# Patient Record
Sex: MALE | Race: WHITE | NOT HISPANIC OR LATINO | Employment: OTHER | URBAN - METROPOLITAN AREA
[De-identification: names, ages, dates, MRNs, and addresses within clinical notes are randomized per-mention and may not be internally consistent; named-entity substitution may affect disease eponyms.]

---

## 2017-01-04 ENCOUNTER — OFFICE VISIT (OUTPATIENT)
Dept: FAMILY MEDICINE CLINIC | Facility: CLINIC | Age: 72
End: 2017-01-04

## 2017-01-04 ENCOUNTER — APPOINTMENT (OUTPATIENT)
Dept: LAB | Facility: HOSPITAL | Age: 72
End: 2017-01-04

## 2017-01-04 VITALS
BODY MASS INDEX: 22.89 KG/M2 | HEIGHT: 72 IN | WEIGHT: 169 LBS | DIASTOLIC BLOOD PRESSURE: 68 MMHG | SYSTOLIC BLOOD PRESSURE: 104 MMHG | HEART RATE: 70 BPM

## 2017-01-04 DIAGNOSIS — R73.03 PREDIABETES: ICD-10-CM

## 2017-01-04 DIAGNOSIS — E78.49 OTHER HYPERLIPIDEMIA: Primary | ICD-10-CM

## 2017-01-04 DIAGNOSIS — I10 ESSENTIAL HYPERTENSION: ICD-10-CM

## 2017-01-04 LAB
ALBUMIN SERPL-MCNC: 4.4 G/DL (ref 3.2–4.8)
ALBUMIN/GLOB SERPL: 1.8 G/DL (ref 1.5–2.5)
ALP SERPL-CCNC: 116 U/L (ref 25–100)
ALT SERPL W P-5'-P-CCNC: 27 U/L (ref 7–40)
ANION GAP SERPL CALCULATED.3IONS-SCNC: 7 MMOL/L (ref 3–11)
ARTICHOKE IGE QN: 54 MG/DL (ref 0–130)
AST SERPL-CCNC: 23 U/L (ref 0–33)
BILIRUB SERPL-MCNC: 1.1 MG/DL (ref 0.3–1.2)
BUN BLD-MCNC: 14 MG/DL (ref 9–23)
BUN/CREAT SERPL: 17.5 (ref 7–25)
CALCIUM SPEC-SCNC: 9.8 MG/DL (ref 8.7–10.4)
CHLORIDE SERPL-SCNC: 106 MMOL/L (ref 99–109)
CHOLEST SERPL-MCNC: 109 MG/DL (ref 0–200)
CO2 SERPL-SCNC: 26 MMOL/L (ref 20–31)
CREAT BLD-MCNC: 0.8 MG/DL (ref 0.6–1.3)
GFR SERPL CREATININE-BSD FRML MDRD: 96 ML/MIN/1.73
GLOBULIN UR ELPH-MCNC: 2.4 GM/DL
GLUCOSE BLD-MCNC: 113 MG/DL (ref 70–100)
HDLC SERPL-MCNC: 42 MG/DL (ref 40–60)
POTASSIUM BLD-SCNC: 4.4 MMOL/L (ref 3.5–5.5)
PROT SERPL-MCNC: 6.8 G/DL (ref 5.7–8.2)
SODIUM BLD-SCNC: 139 MMOL/L (ref 132–146)
TRIGL SERPL-MCNC: 51 MG/DL (ref 0–150)

## 2017-01-04 PROCEDURE — 99213 OFFICE O/P EST LOW 20 MIN: CPT | Performed by: PHYSICIAN ASSISTANT

## 2017-01-04 PROCEDURE — 36415 COLL VENOUS BLD VENIPUNCTURE: CPT | Performed by: PHYSICIAN ASSISTANT

## 2017-01-04 PROCEDURE — 80053 COMPREHEN METABOLIC PANEL: CPT | Performed by: PHYSICIAN ASSISTANT

## 2017-01-04 PROCEDURE — 83036 HEMOGLOBIN GLYCOSYLATED A1C: CPT | Performed by: PHYSICIAN ASSISTANT

## 2017-01-04 PROCEDURE — 80061 LIPID PANEL: CPT | Performed by: PHYSICIAN ASSISTANT

## 2017-01-04 RX ORDER — NIACIN 1000 MG/1
TABLET, EXTENDED RELEASE ORAL
Qty: 180 TABLET | Refills: 1 | Status: SHIPPED | OUTPATIENT
Start: 2017-01-04 | End: 2017-07-06 | Stop reason: SDUPTHER

## 2017-01-04 RX ORDER — ATORVASTATIN CALCIUM 10 MG/1
10 TABLET, FILM COATED ORAL DAILY
Qty: 90 TABLET | Refills: 3 | Status: SHIPPED | OUTPATIENT
Start: 2017-01-04 | End: 2018-01-12 | Stop reason: SDUPTHER

## 2017-01-04 RX ORDER — CLOPIDOGREL BISULFATE 75 MG/1
75 TABLET ORAL DAILY
Qty: 90 TABLET | Refills: 3 | Status: SHIPPED | OUTPATIENT
Start: 2017-01-04 | End: 2018-01-12 | Stop reason: SDUPTHER

## 2017-01-04 RX ORDER — INFLUENZA A VIRUS A/MICHIGAN/45/2015 X-275 (H1N1) ANTIGEN (FORMALDEHYDE INACTIVATED), INFLUENZA A VIRUS A/SINGAPORE/INFIMH-16-0019/2016 IVR-186 (H3N2) ANTIGEN (FORMALDEHYDE INACTIVATED), AND INFLUENZA B VIRUS B/MARYLAND/15/2016 BX-69A (A B/COLORADO/6/2017-LIKE VIRUS) ANTIGEN (FORMALDEHYDE INACTIVATED) 60; 60; 60 UG/.5ML; UG/.5ML; UG/.5ML
INJECTION, SUSPENSION INTRAMUSCULAR
COMMUNITY
Start: 2016-11-01 | End: 2017-07-06

## 2017-01-04 RX ORDER — LISINOPRIL 20 MG/1
20 TABLET ORAL DAILY
Qty: 90 TABLET | Refills: 3 | Status: SHIPPED | OUTPATIENT
Start: 2017-01-04 | End: 2018-01-12 | Stop reason: SDUPTHER

## 2017-01-04 NOTE — PROGRESS NOTES
Subjective   Dominik Varma is a 71 y.o. male    History of Present Illness  Patient presents enough stay for follow-up on hypertension, history of CVA and hyperlipidemia.  CVA was in 2004 he has been on Plavix ever since.  He stopped smoking at that time as well.  He stays active is a  and feels well denies any complaints.  He has a history of prediabetes in the past that he treats with diet.  The following portions of the patient's history were reviewed and updated as appropriate: allergies, current medications, past social history and problem list    Review of Systems   Constitutional: Negative for appetite change, diaphoresis, fatigue and unexpected weight change.   Eyes: Negative for visual disturbance.   Respiratory: Negative for cough, chest tightness and shortness of breath.    Cardiovascular: Negative for chest pain, palpitations and leg swelling.   Gastrointestinal: Negative for diarrhea, nausea and vomiting.   Endocrine: Negative for polydipsia, polyphagia and polyuria.   Skin: Negative for color change and rash.   Neurological: Negative for dizziness, syncope, weakness, light-headedness, numbness and headaches.       Objective     Vitals:    01/04/17 1057   BP: 104/68   Pulse: 70       Physical Exam   Constitutional: He appears well-developed and well-nourished.   Neck: No JVD present.   Cardiovascular: Normal rate, regular rhythm, normal heart sounds, intact distal pulses and normal pulses.    No murmur heard.  Pulmonary/Chest: Effort normal and breath sounds normal. No respiratory distress.   Abdominal: Soft. Bowel sounds are normal. There is no hepatosplenomegaly. There is no tenderness.   Musculoskeletal: He exhibits no edema.   Skin: Skin is warm and dry.   Psychiatric: He has a normal mood and affect. His behavior is normal. Judgment and thought content normal.   Nursing note and vitals reviewed.      Assessment/Plan     Diagnoses and all orders for this visit:    Other  hyperlipidemia  -     Lipid Panel    Prediabetes  -     Hemoglobin A1c    Essential hypertension  -     Comprehensive Metabolic Panel    Other orders  -     FLUZONE HIGH-DOSE 0.5 ML suspension prefilled syringe injection;   -     niacin (NIASPAN) 1000 MG CR tablet; Take one twice daily  -     lisinopril (PRINIVIL,ZESTRIL) 20 MG tablet; Take 1 tablet by mouth Daily.  -     clopidogrel (PLAVIX) 75 MG tablet; Take 1 tablet by mouth Daily.  -     atorvastatin (LIPITOR) 10 MG tablet; Take 1 tablet by mouth Daily.

## 2017-01-04 NOTE — MR AVS SNAPSHOT
Dominik HOWARD Avani   1/4/2017 10:30 AM   Office Visit    Dept Phone:  [phone]   Encounter #:  [id number]    Provider:  Kassidy Bolton PA-C   Department:  [facility]                Your Full Care Plan              Today's Medication Changes          These changes are accurate as of: 1/4/17 11:29 AM.  If you have any questions, ask your nurse or doctor.               Medication(s)that have changed:     atorvastatin 10 MG tablet   Commonly known as:  LIPITOR   Take 1 tablet by mouth Daily.   What changed:  See the new instructions.       clopidogrel 75 MG tablet   Commonly known as:  PLAVIX   Take 1 tablet by mouth Daily.   What changed:  See the new instructions.       lisinopril 20 MG tablet   Commonly known as:  PRINIVIL,ZESTRIL   Take 1 tablet by mouth Daily.   What changed:  See the new instructions.       niacin 1000 MG CR tablet   Commonly known as:  NIASPAN   Take one twice daily   What changed:  See the new instructions.            Where to Get Your Medications      These medications were sent to [address] AT  & [facility] - [phone]  - [phone] [address] [address]     Phone:  [phone]     atorvastatin 10 MG tablet    clopidogrel 75 MG tablet    lisinopril 20 MG tablet    niacin 1000 MG CR tablet                  Your Updated Medication List          This list is accurate as of: 1/4/17 11:29 AM.  Always use your most recent med list.                atorvastatin 10 MG tablet   Commonly known as:  LIPITOR   Take 1 tablet by mouth Daily.       clopidogrel 75 MG tablet   Commonly known as:  PLAVIX   Take 1 tablet by mouth Daily.       FLUZONE HIGH-DOSE 0.5 ML suspension prefilled syringe injection   Generic drug:  influenza vac split high-dose       lisinopril 20 MG tablet   Commonly known as:  PRINIVIL,ZESTRIL   Take 1 tablet by mouth Daily.       niacin  "1000 MG CR tablet   Commonly known as:  NIASPAN   Take one twice daily               We Performed the Following     Comprehensive Metabolic Panel     Hemoglobin A1c     Lipid Panel       You Were Diagnosed With        Codes Comments    Other hyperlipidemia    -  Primary ICD-10-CM: E78.4  ICD-9-CM: 272.4     Prediabetes     ICD-10-CM: R73.03  ICD-9-CM: 790.29     Essential hypertension     ICD-10-CM: I10  ICD-9-CM: 401.9       Instructions     None    Patient Instructions History      Upcoming Appointments     Visit Type Date Time Department    OFFICE VISIT 2017 10:30 AM [facility]      [employer] Signup     [facility] [employer] allows you to send messages to your doctor, view your test results, renew your prescriptions, schedule appointments, and more. To sign up, go to [employer] and click on the Sign Up Now link in the New User? box. Enter your [employer] Activation Code exactly as it appears below along with the last four digits of your Social Security Number and your Date of Birth () to complete the sign-up process. If you do not sign up before the expiration date, you must request a new code.    [employer] Activation Code: [email or web address]  Expires: 2017 11:29 AM    If you have questions, you can email [email or web address] or call [phone] to talk to our [employer] staff. Remember, [employer] is NOT to be used for urgent needs. For medical emergencies, dial 911.               Other Info from Your Visit           Allergies     No Known Allergies      Reason for Visit     Med Refill lipitor, plavix, lisinolpril, and niaspan      Vital Signs     Blood Pressure Pulse Height Weight Body Mass Index Smoking Status    104/68 (BP Location: Left arm, Patient Position: Sitting, Cuff Size: Adult) 70 72\" (182.9 cm) 169 lb (76.7 kg) 22.92 kg/m2 Former Smoker      Problems and Diagnoses Noted     High cholesterol or triglycerides    Prediabetes        High blood pressure            "

## 2017-01-05 ENCOUNTER — TELEPHONE (OUTPATIENT)
Dept: FAMILY MEDICINE CLINIC | Facility: CLINIC | Age: 72
End: 2017-01-05

## 2017-01-05 LAB — HBA1C MFR BLD: 5.5 % (ref 4.8–5.6)

## 2017-01-05 NOTE — TELEPHONE ENCOUNTER
----- Message from Kassidy Bolton PA-C sent at 1/5/2017  2:47 PM EST -----  Mary, patient letter has been sent to printer with report results. Please mail.

## 2017-07-06 ENCOUNTER — OFFICE VISIT (OUTPATIENT)
Dept: FAMILY MEDICINE CLINIC | Facility: CLINIC | Age: 72
End: 2017-07-06

## 2017-07-06 VITALS
DIASTOLIC BLOOD PRESSURE: 64 MMHG | TEMPERATURE: 97.9 F | OXYGEN SATURATION: 100 % | SYSTOLIC BLOOD PRESSURE: 100 MMHG | WEIGHT: 164 LBS | HEART RATE: 47 BPM | BODY MASS INDEX: 22.21 KG/M2 | HEIGHT: 72 IN

## 2017-07-06 DIAGNOSIS — I10 ESSENTIAL HYPERTENSION: Primary | ICD-10-CM

## 2017-07-06 DIAGNOSIS — R00.1 SINUS BRADYCARDIA: ICD-10-CM

## 2017-07-06 DIAGNOSIS — I67.9 CEREBROVASCULAR DISEASE: ICD-10-CM

## 2017-07-06 PROCEDURE — 93000 ELECTROCARDIOGRAM COMPLETE: CPT | Performed by: PHYSICIAN ASSISTANT

## 2017-07-06 PROCEDURE — 99213 OFFICE O/P EST LOW 20 MIN: CPT | Performed by: PHYSICIAN ASSISTANT

## 2017-07-06 RX ORDER — NIACIN 1000 MG/1
TABLET, EXTENDED RELEASE ORAL
Qty: 180 TABLET | Refills: 1 | Status: ON HOLD | OUTPATIENT
Start: 2017-07-06 | End: 2017-10-17

## 2017-07-06 NOTE — PROGRESS NOTES
Subjective   Dominik Varma is a 71 y.o. male    History of Present Illness  Patient presents today for six-month follow-up on hypertension.  Blood pressure continues to be well controlled, blood pressure is stable compared to the last time he was here, does appear to be somewhat hypotensive but has remained stable for him and he states he feels great.  He is an active  and states that he doesn't have any problem whatsoever with fatigue, dizziness, lightheadedness and shortness of breath or chest pain.  He states his energy is great.  He denies any problems with this medication.  The following portions of the patient's history were reviewed and updated as appropriate: allergies, current medications, past social history and problem list    Review of Systems   Constitutional: Negative for fatigue and unexpected weight change.   Respiratory: Negative for cough, chest tightness and shortness of breath.    Cardiovascular: Negative for chest pain, palpitations and leg swelling.   Gastrointestinal: Negative for nausea.   Skin: Negative for color change and rash.   Neurological: Negative for dizziness, syncope, weakness and headaches.       Objective     Vitals:    07/06/17 1018   BP: 100/64   Pulse: (!) 47   Temp: 97.9 °F (36.6 °C)   SpO2: 100%       Physical Exam   Constitutional: He appears well-developed and well-nourished. No distress.   Neck: No JVD present.   Cardiovascular: Normal rate, regular rhythm, normal heart sounds and intact distal pulses.    No murmur heard.  Pulmonary/Chest: Effort normal and breath sounds normal. No respiratory distress. He exhibits no tenderness.   Abdominal: Soft. He exhibits no distension. There is no tenderness.   Musculoskeletal: He exhibits no edema.   Skin: Skin is warm and dry. He is not diaphoretic. No erythema. No pallor.   Psychiatric: He has a normal mood and affect.   Nursing note and vitals reviewed.    ECG 12 Lead  Date/Time: 7/6/2017 1:02 PM  Performed by:  MILADY CROCKER  Authorized by: MILADY CROCKER   Comparison: compared with previous ECG from 9/16/2015  Rhythm: sinus bradycardia and A-V block  Rate: bradycardic  BPM: 43  ST Segments: ST segments normal  T Waves: T waves normal  QRS axis: left  Other: no other findings  Clinical impression: abnormal ECG  Comments: No acute changes compared to September 16, 2015 EKG, patient asymptomatic as well          .[facility]    Assessment/Plan     Diagnoses and all orders for this visit:    Essential hypertension    Sinus bradycardia    Cerebrovascular disease    Other orders  -     niacin (NIASPAN) 1000 MG CR tablet; Take one twice daily  -     ECG 12 Lead    I discussed in detail with patient and his bradycardic heart rate, he has been bradycardic chronically for the past couple of years and is completely asymptomatic with a very exertional lifestyle.  He agreed that he will follow-up and notify us if he develops any increased fatigue, dyspnea, lightheadedness or dizziness or any generalized weakness.  At that point time I would recommend patient be seen and evaluated further with a Holter monitor, echocardiogram and consultation with cardiology.

## 2017-09-22 ENCOUNTER — APPOINTMENT (OUTPATIENT)
Dept: LAB | Facility: HOSPITAL | Age: 72
End: 2017-09-22

## 2017-09-22 ENCOUNTER — OFFICE VISIT (OUTPATIENT)
Dept: FAMILY MEDICINE CLINIC | Facility: CLINIC | Age: 72
End: 2017-09-22

## 2017-09-22 VITALS
BODY MASS INDEX: 22.46 KG/M2 | SYSTOLIC BLOOD PRESSURE: 120 MMHG | OXYGEN SATURATION: 97 % | HEIGHT: 72 IN | TEMPERATURE: 97.5 F | WEIGHT: 165.8 LBS | HEART RATE: 58 BPM | DIASTOLIC BLOOD PRESSURE: 72 MMHG

## 2017-09-22 DIAGNOSIS — I10 ESSENTIAL HYPERTENSION: Primary | ICD-10-CM

## 2017-09-22 DIAGNOSIS — R79.89 ABNORMAL CBC: ICD-10-CM

## 2017-09-22 DIAGNOSIS — R07.9 CHEST PAIN, UNSPECIFIED TYPE: ICD-10-CM

## 2017-09-22 DIAGNOSIS — R73.03 PREDIABETES: ICD-10-CM

## 2017-09-22 LAB
ANION GAP SERPL CALCULATED.3IONS-SCNC: 7 MMOL/L (ref 3–11)
BUN BLD-MCNC: 15 MG/DL (ref 9–23)
BUN/CREAT SERPL: 18.8 (ref 7–25)
CALCIUM SPEC-SCNC: 9.6 MG/DL (ref 8.7–10.4)
CHLORIDE SERPL-SCNC: 104 MMOL/L (ref 99–109)
CO2 SERPL-SCNC: 28 MMOL/L (ref 20–31)
CREAT BLD-MCNC: 0.8 MG/DL (ref 0.6–1.3)
DEPRECATED RDW RBC AUTO: 49.9 FL (ref 37–54)
ERYTHROCYTE [DISTWIDTH] IN BLOOD BY AUTOMATED COUNT: 14.5 % (ref 11.3–14.5)
GFR SERPL CREATININE-BSD FRML MDRD: 95 ML/MIN/1.73
GLUCOSE BLD-MCNC: 106 MG/DL (ref 70–100)
HCT VFR BLD AUTO: 44.1 % (ref 38.9–50.9)
HGB BLD-MCNC: 14.5 G/DL (ref 13.1–17.5)
MCH RBC QN AUTO: 30.9 PG (ref 27–31)
MCHC RBC AUTO-ENTMCNC: 32.9 G/DL (ref 32–36)
MCV RBC AUTO: 94 FL (ref 80–99)
PLATELET # BLD AUTO: 196 10*3/MM3 (ref 150–450)
PMV BLD AUTO: 11.2 FL (ref 6–12)
POTASSIUM BLD-SCNC: 5.1 MMOL/L (ref 3.5–5.5)
RBC # BLD AUTO: 4.69 10*6/MM3 (ref 4.2–5.76)
SODIUM BLD-SCNC: 139 MMOL/L (ref 132–146)
WBC NRBC COR # BLD: 11.03 10*3/MM3 (ref 3.5–10.8)

## 2017-09-22 PROCEDURE — 36415 COLL VENOUS BLD VENIPUNCTURE: CPT | Performed by: PHYSICIAN ASSISTANT

## 2017-09-22 PROCEDURE — 80048 BASIC METABOLIC PNL TOTAL CA: CPT | Performed by: PHYSICIAN ASSISTANT

## 2017-09-22 PROCEDURE — 85027 COMPLETE CBC AUTOMATED: CPT | Performed by: PHYSICIAN ASSISTANT

## 2017-09-22 PROCEDURE — 99213 OFFICE O/P EST LOW 20 MIN: CPT | Performed by: PHYSICIAN ASSISTANT

## 2017-09-22 NOTE — PROGRESS NOTES
Subjective   Dominik Varma is a 72 y.o. male    History of Present Illness  Patient comes in today for hospital follow-up.  He was admitted to [facility] in the middle the night Tuesday night around midnight he started experiencing a intense sharp chest pain left side of his chest wall laying in bed.  He took an aspirin but didn't see any improvement so his wife drove him to the closest hospital which was [facility], they live in [place].  He stayed there overnight getting normal labs, normal EKG, normal chest x-ray but was told that they wanted to observe him, by the next day he was pain-free but started having some epigastric pain following breakfast, he was supposed to be scheduled for an EGD, was cleared by Dr. Chico Marcelino as the on-call cardiologist after having a normal echocardiogram as well but for some reason the EGD was not done, ultrasound of abdomen and HIDA scan was done and was normal, patient left AMA yesterday afternoon in frustration.  He has been on Prilosec since yesterday and again mentions no chest pain since Wednesday.  He is eager to get back to work.  The only abnormality that they were told was that his white blood cell count was slightly elevated.    The following portions of the patient's history were reviewed and updated as appropriate: allergies, current medications, past social history and problem list    Review of Systems   HENT: Negative.    Respiratory: Negative.  Negative for chest tightness.    Cardiovascular: Positive for chest pain ( Chest pain Tuesday night into Wednesday morning, none since). Negative for palpitations.   Gastrointestinal: Positive for abdominal pain ( Experienced epigastric abdominal pain on Wednesday but not since then.).       Objective     Vitals:    09/22/17 1150   BP: 120/72   Pulse: 58   Temp: 97.5 °F (36.4 °C)   SpO2: 97%       Physical Exam   Constitutional: He is oriented to person, place, and time. He appears  well-developed and well-nourished. No distress.   Neck: No JVD present.   Cardiovascular: Normal rate, regular rhythm, normal heart sounds and intact distal pulses.    No murmur heard.  Pulmonary/Chest: Effort normal. No respiratory distress. He has no rales. He exhibits no tenderness.   Abdominal: Soft. Bowel sounds are normal. He exhibits no distension. There is no tenderness.   Neurological: He is alert and oriented to person, place, and time.   Skin: Skin is warm and dry. He is not diaphoretic.   Psychiatric: He has a normal mood and affect. His behavior is normal.   Nursing note and vitals reviewed.      Assessment/Plan     Diagnoses and all orders for this visit:    Essential hypertension  -     Basic Metabolic Panel  -     Ambulatory Referral to Cardiology    Prediabetes    Abnormal CBC  -     CBC (No Diff)    Chest pain, unspecified type  -     Ambulatory Referral to Cardiology    Unfortunately, despite multiple attempts to retrieve records I have not been able to receive any of his hospital records confirming any of his medical history while in the hospital, we'll continue to try to obtain these and have them sent to Dr. Phoenix as well for cardiology clearance.  I discussed with patient and his family both his wife and a son in the room that if he is cleared by cardiology then I would recommend scheduling an EGD.  He will continue on Prilosec 40 mg at this time.

## 2017-10-16 ENCOUNTER — TRANSCRIBE ORDERS (OUTPATIENT)
Dept: CARDIOLOGY | Facility: HOSPITAL | Age: 72
End: 2017-10-16

## 2017-10-16 DIAGNOSIS — R94.39 ABNORMAL STRESS TEST: Primary | ICD-10-CM

## 2017-10-17 ENCOUNTER — HOSPITAL ENCOUNTER (OUTPATIENT)
Facility: HOSPITAL | Age: 72
Setting detail: HOSPITAL OUTPATIENT SURGERY
Discharge: HOME | End: 2017-10-17
Attending: INTERNAL MEDICINE | Admitting: INTERNAL MEDICINE

## 2017-10-17 VITALS
BODY MASS INDEX: 22.78 KG/M2 | SYSTOLIC BLOOD PRESSURE: 135 MMHG | DIASTOLIC BLOOD PRESSURE: 68 MMHG | TEMPERATURE: 98.3 F | HEIGHT: 72 IN | HEART RATE: 49 BPM | WEIGHT: 168.21 LBS | RESPIRATION RATE: 20 BRPM | OXYGEN SATURATION: 94 %

## 2017-10-17 DIAGNOSIS — R94.39 ABNORMAL STRESS TEST: ICD-10-CM

## 2017-10-17 LAB
ANION GAP SERPL CALCULATED.3IONS-SCNC: 6 MMOL/L (ref 3–11)
BUN BLD-MCNC: 15 MG/DL (ref 9–23)
BUN/CREAT SERPL: 18.8 (ref 7–25)
CALCIUM SPEC-SCNC: 9.5 MG/DL (ref 8.7–10.4)
CHLORIDE SERPL-SCNC: 108 MMOL/L (ref 99–109)
CO2 SERPL-SCNC: 25 MMOL/L (ref 20–31)
CREAT BLD-MCNC: 0.8 MG/DL (ref 0.6–1.3)
DEPRECATED RDW RBC AUTO: 48.6 FL (ref 37–54)
ERYTHROCYTE [DISTWIDTH] IN BLOOD BY AUTOMATED COUNT: 14.2 % (ref 11.3–14.5)
GFR SERPL CREATININE-BSD FRML MDRD: 95 ML/MIN/1.73
GLUCOSE BLD-MCNC: 122 MG/DL (ref 70–100)
HCT VFR BLD AUTO: 42.1 % (ref 38.9–50.9)
HGB BLD-MCNC: 13.7 G/DL (ref 13.1–17.5)
MCH RBC QN AUTO: 30.4 PG (ref 27–31)
MCHC RBC AUTO-ENTMCNC: 32.5 G/DL (ref 32–36)
MCV RBC AUTO: 93.6 FL (ref 80–99)
PLATELET # BLD AUTO: 166 10*3/MM3 (ref 150–450)
PMV BLD AUTO: 10.7 FL (ref 6–12)
POTASSIUM BLD-SCNC: 4.3 MMOL/L (ref 3.5–5.5)
RBC # BLD AUTO: 4.5 10*6/MM3 (ref 4.2–5.76)
SODIUM BLD-SCNC: 139 MMOL/L (ref 132–146)
WBC NRBC COR # BLD: 8.89 10*3/MM3 (ref 3.5–10.8)

## 2017-10-17 PROCEDURE — 85027 COMPLETE CBC AUTOMATED: CPT | Performed by: INTERNAL MEDICINE

## 2017-10-17 PROCEDURE — 0 IOPAMIDOL PER 1 ML: Performed by: INTERNAL MEDICINE

## 2017-10-17 PROCEDURE — 25010000002 HEPARIN (PORCINE) PER 1000 UNITS: Performed by: INTERNAL MEDICINE

## 2017-10-17 PROCEDURE — 25010000002 FENTANYL CITRATE (PF) 100 MCG/2ML SOLUTION: Performed by: INTERNAL MEDICINE

## 2017-10-17 PROCEDURE — C1769 GUIDE WIRE: HCPCS | Performed by: INTERNAL MEDICINE

## 2017-10-17 PROCEDURE — 25010000002 MIDAZOLAM PER 1 MG: Performed by: INTERNAL MEDICINE

## 2017-10-17 PROCEDURE — 36415 COLL VENOUS BLD VENIPUNCTURE: CPT

## 2017-10-17 PROCEDURE — C1894 INTRO/SHEATH, NON-LASER: HCPCS | Performed by: INTERNAL MEDICINE

## 2017-10-17 PROCEDURE — 80048 BASIC METABOLIC PNL TOTAL CA: CPT | Performed by: INTERNAL MEDICINE

## 2017-10-17 PROCEDURE — 93454 CORONARY ARTERY ANGIO S&I: CPT | Performed by: INTERNAL MEDICINE

## 2017-10-17 RX ORDER — SODIUM CHLORIDE 9 MG/ML
250 INJECTION, SOLUTION INTRAVENOUS CONTINUOUS
Status: ACTIVE | OUTPATIENT
Start: 2017-10-17 | End: 2017-10-17

## 2017-10-17 RX ORDER — ACETAMINOPHEN 325 MG/1
650 TABLET ORAL EVERY 4 HOURS PRN
Status: DISCONTINUED | OUTPATIENT
Start: 2017-10-17 | End: 2017-10-17 | Stop reason: HOSPADM

## 2017-10-17 RX ORDER — NALOXONE HCL 0.4 MG/ML
0.4 VIAL (ML) INJECTION
Status: DISCONTINUED | OUTPATIENT
Start: 2017-10-17 | End: 2017-10-17 | Stop reason: HOSPADM

## 2017-10-17 RX ORDER — OMEPRAZOLE 20 MG/1
20 CAPSULE, DELAYED RELEASE ORAL DAILY
COMMUNITY
End: 2018-01-12 | Stop reason: SDUPTHER

## 2017-10-17 RX ORDER — SODIUM CHLORIDE 9 MG/ML
INJECTION, SOLUTION INTRAVENOUS CONTINUOUS PRN
Status: DISCONTINUED | OUTPATIENT
Start: 2017-10-17 | End: 2017-10-17 | Stop reason: HOSPADM

## 2017-10-17 RX ORDER — LIDOCAINE HYDROCHLORIDE 10 MG/ML
INJECTION, SOLUTION INFILTRATION; PERINEURAL AS NEEDED
Status: DISCONTINUED | OUTPATIENT
Start: 2017-10-17 | End: 2017-10-17 | Stop reason: HOSPADM

## 2017-10-17 RX ORDER — HYDROCODONE BITARTRATE AND ACETAMINOPHEN 5; 325 MG/1; MG/1
1 TABLET ORAL EVERY 4 HOURS PRN
Status: DISCONTINUED | OUTPATIENT
Start: 2017-10-17 | End: 2017-10-17 | Stop reason: HOSPADM

## 2017-10-17 RX ORDER — ALPRAZOLAM 0.25 MG/1
0.25 TABLET ORAL 3 TIMES DAILY PRN
Status: DISCONTINUED | OUTPATIENT
Start: 2017-10-17 | End: 2017-10-17 | Stop reason: HOSPADM

## 2017-10-17 RX ORDER — MORPHINE SULFATE 2 MG/ML
1 INJECTION, SOLUTION INTRAMUSCULAR; INTRAVENOUS EVERY 4 HOURS PRN
Status: DISCONTINUED | OUTPATIENT
Start: 2017-10-17 | End: 2017-10-17 | Stop reason: HOSPADM

## 2017-10-17 RX ORDER — ASPIRIN 81 MG/1
81 TABLET ORAL NIGHTLY
COMMUNITY

## 2017-10-17 RX ORDER — FENTANYL CITRATE 50 UG/ML
INJECTION, SOLUTION INTRAMUSCULAR; INTRAVENOUS AS NEEDED
Status: DISCONTINUED | OUTPATIENT
Start: 2017-10-17 | End: 2017-10-17 | Stop reason: HOSPADM

## 2017-10-17 RX ORDER — TEMAZEPAM 7.5 MG/1
7.5 CAPSULE ORAL NIGHTLY PRN
Status: DISCONTINUED | OUTPATIENT
Start: 2017-10-17 | End: 2017-10-17 | Stop reason: HOSPADM

## 2017-10-17 RX ORDER — ASPIRIN 325 MG
325 TABLET, DELAYED RELEASE (ENTERIC COATED) ORAL DAILY
Status: DISCONTINUED | OUTPATIENT
Start: 2017-10-17 | End: 2017-10-17 | Stop reason: HOSPADM

## 2017-10-17 RX ORDER — MIDAZOLAM HYDROCHLORIDE 1 MG/ML
INJECTION INTRAMUSCULAR; INTRAVENOUS AS NEEDED
Status: DISCONTINUED | OUTPATIENT
Start: 2017-10-17 | End: 2017-10-17 | Stop reason: HOSPADM

## 2017-10-17 RX ADMIN — ASPIRIN 325 MG: 325 TABLET, DELAYED RELEASE ORAL at 12:52

## 2018-01-12 ENCOUNTER — OFFICE VISIT (OUTPATIENT)
Dept: FAMILY MEDICINE CLINIC | Facility: CLINIC | Age: 73
End: 2018-01-12

## 2018-01-12 ENCOUNTER — APPOINTMENT (OUTPATIENT)
Dept: LAB | Facility: HOSPITAL | Age: 73
End: 2018-01-12

## 2018-01-12 VITALS
WEIGHT: 175 LBS | OXYGEN SATURATION: 97 % | SYSTOLIC BLOOD PRESSURE: 132 MMHG | HEART RATE: 65 BPM | BODY MASS INDEX: 23.73 KG/M2 | DIASTOLIC BLOOD PRESSURE: 80 MMHG | TEMPERATURE: 97.9 F

## 2018-01-12 DIAGNOSIS — E78.5 HYPERLIPIDEMIA, UNSPECIFIED HYPERLIPIDEMIA TYPE: ICD-10-CM

## 2018-01-12 DIAGNOSIS — I15.9 SECONDARY HYPERTENSION: ICD-10-CM

## 2018-01-12 DIAGNOSIS — I67.9 CEREBROVASCULAR DISEASE: ICD-10-CM

## 2018-01-12 DIAGNOSIS — R73.03 PREDIABETES: ICD-10-CM

## 2018-01-12 DIAGNOSIS — K21.9 GASTROESOPHAGEAL REFLUX DISEASE, ESOPHAGITIS PRESENCE NOT SPECIFIED: Primary | ICD-10-CM

## 2018-01-12 LAB
ALBUMIN SERPL-MCNC: 4.4 G/DL (ref 3.2–4.8)
ALBUMIN/GLOB SERPL: 1.8 G/DL (ref 1.5–2.5)
ALP SERPL-CCNC: 120 U/L (ref 25–100)
ALT SERPL W P-5'-P-CCNC: 30 U/L (ref 7–40)
ANION GAP SERPL CALCULATED.3IONS-SCNC: 3 MMOL/L (ref 3–11)
ARTICHOKE IGE QN: 85 MG/DL (ref 0–130)
AST SERPL-CCNC: 26 U/L (ref 0–33)
BASOPHILS # BLD AUTO: 0.02 10*3/MM3 (ref 0–0.2)
BASOPHILS NFR BLD AUTO: 0.2 % (ref 0–1)
BILIRUB SERPL-MCNC: 1.1 MG/DL (ref 0.3–1.2)
BUN BLD-MCNC: 18 MG/DL (ref 9–23)
BUN/CREAT SERPL: 20 (ref 7–25)
CALCIUM SPEC-SCNC: 9.7 MG/DL (ref 8.7–10.4)
CHLORIDE SERPL-SCNC: 109 MMOL/L (ref 99–109)
CHOLEST SERPL-MCNC: 129 MG/DL (ref 0–200)
CO2 SERPL-SCNC: 25 MMOL/L (ref 20–31)
CREAT BLD-MCNC: 0.9 MG/DL (ref 0.6–1.3)
DEPRECATED RDW RBC AUTO: 47.6 FL (ref 37–54)
EOSINOPHIL # BLD AUTO: 0.5 10*3/MM3 (ref 0–0.3)
EOSINOPHIL NFR BLD AUTO: 5.6 % (ref 0–3)
ERYTHROCYTE [DISTWIDTH] IN BLOOD BY AUTOMATED COUNT: 14.1 % (ref 11.3–14.5)
GFR SERPL CREATININE-BSD FRML MDRD: 83 ML/MIN/1.73
GLOBULIN UR ELPH-MCNC: 2.5 GM/DL
GLUCOSE BLD-MCNC: 116 MG/DL (ref 70–100)
HBA1C MFR BLD: 6.1 % (ref 4.8–5.6)
HCT VFR BLD AUTO: 42.5 % (ref 38.9–50.9)
HDLC SERPL-MCNC: 36 MG/DL (ref 40–60)
HGB BLD-MCNC: 13.7 G/DL (ref 13.1–17.5)
IMM GRANULOCYTES # BLD: 0.04 10*3/MM3 (ref 0–0.03)
IMM GRANULOCYTES NFR BLD: 0.5 % (ref 0–0.6)
LYMPHOCYTES # BLD AUTO: 2.18 10*3/MM3 (ref 0.6–4.8)
LYMPHOCYTES NFR BLD AUTO: 24.6 % (ref 24–44)
MCH RBC QN AUTO: 29.8 PG (ref 27–31)
MCHC RBC AUTO-ENTMCNC: 32.2 G/DL (ref 32–36)
MCV RBC AUTO: 92.6 FL (ref 80–99)
MONOCYTES # BLD AUTO: 0.73 10*3/MM3 (ref 0–1)
MONOCYTES NFR BLD AUTO: 8.2 % (ref 0–12)
NEUTROPHILS # BLD AUTO: 5.4 10*3/MM3 (ref 1.5–8.3)
NEUTROPHILS NFR BLD AUTO: 60.9 % (ref 41–71)
PLATELET # BLD AUTO: 205 10*3/MM3 (ref 150–450)
PMV BLD AUTO: 10.9 FL (ref 6–12)
POTASSIUM BLD-SCNC: 4.5 MMOL/L (ref 3.5–5.5)
PROT SERPL-MCNC: 6.9 G/DL (ref 5.7–8.2)
RBC # BLD AUTO: 4.59 10*6/MM3 (ref 4.2–5.76)
SODIUM BLD-SCNC: 137 MMOL/L (ref 132–146)
TRIGL SERPL-MCNC: 63 MG/DL (ref 0–150)
WBC NRBC COR # BLD: 8.87 10*3/MM3 (ref 3.5–10.8)

## 2018-01-12 PROCEDURE — 80061 LIPID PANEL: CPT | Performed by: PHYSICIAN ASSISTANT

## 2018-01-12 PROCEDURE — 85025 COMPLETE CBC W/AUTO DIFF WBC: CPT | Performed by: PHYSICIAN ASSISTANT

## 2018-01-12 PROCEDURE — 83036 HEMOGLOBIN GLYCOSYLATED A1C: CPT | Performed by: PHYSICIAN ASSISTANT

## 2018-01-12 PROCEDURE — 36415 COLL VENOUS BLD VENIPUNCTURE: CPT | Performed by: PHYSICIAN ASSISTANT

## 2018-01-12 PROCEDURE — 99213 OFFICE O/P EST LOW 20 MIN: CPT | Performed by: PHYSICIAN ASSISTANT

## 2018-01-12 PROCEDURE — 80053 COMPREHEN METABOLIC PANEL: CPT | Performed by: PHYSICIAN ASSISTANT

## 2018-01-12 RX ORDER — LISINOPRIL 20 MG/1
20 TABLET ORAL DAILY
Qty: 30 TABLET | Refills: 6 | Status: SHIPPED | OUTPATIENT
Start: 2018-01-12 | End: 2018-07-12 | Stop reason: SDUPTHER

## 2018-01-12 RX ORDER — CLOPIDOGREL BISULFATE 75 MG/1
75 TABLET ORAL DAILY
Qty: 30 TABLET | Refills: 6 | Status: SHIPPED | OUTPATIENT
Start: 2018-01-12 | End: 2018-07-12 | Stop reason: SDUPTHER

## 2018-01-12 RX ORDER — ATORVASTATIN CALCIUM 10 MG/1
10 TABLET, FILM COATED ORAL DAILY
Qty: 30 TABLET | Refills: 6 | Status: SHIPPED | OUTPATIENT
Start: 2018-01-12 | End: 2018-07-12 | Stop reason: SDUPTHER

## 2018-01-12 RX ORDER — OMEPRAZOLE 20 MG/1
20 CAPSULE, DELAYED RELEASE ORAL DAILY
Qty: 30 CAPSULE | Refills: 6 | Status: SHIPPED | OUTPATIENT
Start: 2018-01-12 | End: 2018-05-22

## 2018-01-12 NOTE — PROGRESS NOTES
Subjective   Dominik Varma is a 72 y.o. male    History of Present Illness  Patient comes in today for follow-up on hypertension, hyperlipidemia and GERD.  He states he's feeling great, has no complaints and energy is good, appetite is good, no dizziness, no shortness breath or chest pain, continues to work as a farmer, staying physically active each day.  The following portions of the patient's history were reviewed and updated as appropriate: allergies, current medications, past social history and problem list    Review of Systems   Constitutional: Negative for appetite change, fatigue and unexpected weight change.   Respiratory: Negative for cough, chest tightness and shortness of breath.    Cardiovascular: Negative for chest pain, palpitations and leg swelling.   Gastrointestinal: Negative for abdominal distention, abdominal pain, diarrhea and nausea.        Patient not currently experiencing heartburn/acid reflux, well controlled on medication     Skin: Negative for color change and rash.   Neurological: Negative for dizziness, syncope, weakness and headaches.       Objective     Vitals:    01/12/18 0813   BP: 132/80   Pulse: 65   Temp: 97.9 °F (36.6 °C)   SpO2: 97%       Physical Exam   Constitutional: He appears well-developed and well-nourished. No distress.   HENT:   Mouth/Throat: Oropharynx is clear and moist.   Eyes: Conjunctivae are normal.   Neck: No JVD present.   Cardiovascular: Normal rate, regular rhythm, normal heart sounds and intact distal pulses.    No murmur heard.  Pulmonary/Chest: Effort normal and breath sounds normal. No respiratory distress. He exhibits no tenderness.   Abdominal: Soft. Bowel sounds are normal. He exhibits no distension and no mass. There is no tenderness. There is no rebound and no guarding. No hernia.   Musculoskeletal: He exhibits no edema.   Skin: Skin is warm and dry. He is not diaphoretic. No erythema. No pallor.   Psychiatric: He has a normal mood and affect. His  behavior is normal. Judgment and thought content normal.   Very pleasant, friendly and outgoing white male.   Nursing note and vitals reviewed.      Assessment/Plan     Diagnoses and all orders for this visit:    Gastroesophageal reflux disease, esophagitis presence not specified  -     omeprazole (priLOSEC) 20 MG capsule; Take 1 capsule by mouth Daily.    Hyperlipidemia, unspecified hyperlipidemia type  -     atorvastatin (LIPITOR) 10 MG tablet; Take 1 tablet by mouth Daily.  -     Lipid Panel    Secondary hypertension  -     lisinopril (PRINIVIL,ZESTRIL) 20 MG tablet; Take 1 tablet by mouth Daily.  -     Comprehensive Metabolic Panel    Cerebrovascular disease  -     clopidogrel (PLAVIX) 75 MG tablet; Take 1 tablet by mouth Daily.  -     CBC & Differential  -     CBC Auto Differential    Prediabetes  -     Hemoglobin A1c

## 2018-05-22 ENCOUNTER — OFFICE VISIT (OUTPATIENT)
Dept: FAMILY MEDICINE CLINIC | Facility: CLINIC | Age: 73
End: 2018-05-22

## 2018-05-22 VITALS
HEIGHT: 72 IN | BODY MASS INDEX: 22.86 KG/M2 | WEIGHT: 168.8 LBS | TEMPERATURE: 97.9 F | OXYGEN SATURATION: 99 % | DIASTOLIC BLOOD PRESSURE: 72 MMHG | SYSTOLIC BLOOD PRESSURE: 134 MMHG | HEART RATE: 62 BPM

## 2018-05-22 DIAGNOSIS — H61.23 BILATERAL IMPACTED CERUMEN: ICD-10-CM

## 2018-05-22 DIAGNOSIS — R42 DIZZINESS: Primary | ICD-10-CM

## 2018-05-22 DIAGNOSIS — H81.10 BENIGN PAROXYSMAL POSITIONAL VERTIGO, UNSPECIFIED LATERALITY: ICD-10-CM

## 2018-05-22 DIAGNOSIS — I10 ESSENTIAL HYPERTENSION: ICD-10-CM

## 2018-05-22 PROCEDURE — 69209 REMOVE IMPACTED EAR WAX UNI: CPT | Performed by: PHYSICIAN ASSISTANT

## 2018-05-22 PROCEDURE — 99213 OFFICE O/P EST LOW 20 MIN: CPT | Performed by: PHYSICIAN ASSISTANT

## 2018-05-22 PROCEDURE — 93000 ELECTROCARDIOGRAM COMPLETE: CPT | Performed by: PHYSICIAN ASSISTANT

## 2018-05-22 RX ORDER — MECLIZINE HCL 12.5 MG/1
12.5 TABLET ORAL 3 TIMES DAILY PRN
Qty: 12 TABLET | Refills: 0 | Status: SHIPPED | OUTPATIENT
Start: 2018-05-22 | End: 2018-10-11

## 2018-05-22 NOTE — PROGRESS NOTES
Subjective   Dominik Varma is a 72 y.o. male  Dizziness (dizziness with nausea x2 days )      History of Present Illness  Patient comes in today to discuss symptoms of dizziness that began yesterday around 2 in the morning when he got up to urinate from his sleep.  He states he got up and felt off balance and bumped in to the wall.  He denies any chest pain, shortness of breath or syncope.  No headache.  No history of recent head trauma.  No vomiting or diarrhea, has been eating and drinking normally, was not overheated, urinating normally.  He states he got back in bed and felt okay but then when he got up yesterday morning he felt dizzy on and off throughout the day.  Again no other symptoms.  He states he had the sensation that the room was spinning around.  He states that he had his wife try to clear wax out of his ears the night before this began.  He has been having symptoms of fullness in his ears and he thought he had wax buildup.  Blood pressure has been normal.  The following portions of the patient's history were reviewed and updated as appropriate: allergies, current medications, past social history and problem list    Review of Systems   Constitutional: Negative for activity change and fever.   HENT: Negative for ear pain.    Respiratory: Negative for chest tightness and shortness of breath.    Cardiovascular: Negative for chest pain and palpitations.   Gastrointestinal: Negative for abdominal pain, diarrhea, nausea and vomiting.   Genitourinary: Negative for difficulty urinating and dysuria.   Neurological: Positive for dizziness. Negative for seizures, syncope, facial asymmetry, weakness and numbness.       Objective     Vitals:    05/22/18 1319   BP: 134/72   Pulse: 62   Temp: 97.9 °F (36.6 °C)   SpO2: 99%       Physical Exam   Constitutional: He appears well-developed and well-nourished. No distress.   HENT:   Head: Normocephalic and atraumatic.   Initial examination of the ears reveals both ear  canals to be completely occluded with cerumen, after irrigation, complete removal revealed clear TMs and clear ear canals.   Neck: No JVD present.   Cardiovascular: Normal rate, regular rhythm, normal heart sounds and intact distal pulses.    No murmur heard.  Pulses:       Carotid pulses are 2+ on the right side, and 2+ on the left side.  No carotid bruits bilaterally   Pulmonary/Chest: Effort normal and breath sounds normal. No respiratory distress. He exhibits no tenderness.   Abdominal: Soft. He exhibits no distension. There is no tenderness.   Musculoskeletal: He exhibits no edema.   Lymphadenopathy:     He has no cervical adenopathy.   Skin: Skin is warm and dry. He is not diaphoretic. No erythema. No pallor.   Psychiatric: He has a normal mood and affect.   Nursing note and vitals reviewed.    ECG 12 Lead  Date/Time: 5/22/2018 3:34 PM  Performed by: MILADY CROCKER  Authorized by: MILADY CROCKER   Comparison: compared with previous ECG   Rhythm: sinus rhythm            Assessment/Plan     Diagnoses and all orders for this visit:    Dizziness  -     meclizine (ANTIVERT) 12.5 MG tablet; Take 1 tablet by mouth 3 (Three) Times a Day As Needed for dizziness.    Essential hypertension    Benign paroxysmal positional vertigo, unspecified laterality    Bilateral impacted cerumen    Other orders  -     TWINRIX 720-20 injection;   -     ECG 12 Lead

## 2018-07-12 ENCOUNTER — OFFICE VISIT (OUTPATIENT)
Dept: FAMILY MEDICINE CLINIC | Facility: CLINIC | Age: 73
End: 2018-07-12

## 2018-07-12 VITALS
DIASTOLIC BLOOD PRESSURE: 76 MMHG | HEART RATE: 50 BPM | TEMPERATURE: 97.4 F | WEIGHT: 167 LBS | BODY MASS INDEX: 22.62 KG/M2 | OXYGEN SATURATION: 99 % | HEIGHT: 72 IN | SYSTOLIC BLOOD PRESSURE: 128 MMHG | RESPIRATION RATE: 18 BRPM

## 2018-07-12 DIAGNOSIS — I67.9 CEREBROVASCULAR DISEASE: ICD-10-CM

## 2018-07-12 DIAGNOSIS — I15.9 SECONDARY HYPERTENSION: ICD-10-CM

## 2018-07-12 DIAGNOSIS — R73.03 PREDIABETES: Primary | ICD-10-CM

## 2018-07-12 DIAGNOSIS — E78.5 HYPERLIPIDEMIA, UNSPECIFIED HYPERLIPIDEMIA TYPE: ICD-10-CM

## 2018-07-12 PROCEDURE — 99213 OFFICE O/P EST LOW 20 MIN: CPT | Performed by: PHYSICIAN ASSISTANT

## 2018-07-12 RX ORDER — LISINOPRIL 20 MG/1
20 TABLET ORAL DAILY
Qty: 30 TABLET | Refills: 6 | Status: SHIPPED | OUTPATIENT
Start: 2018-07-12 | End: 2019-01-21 | Stop reason: SDUPTHER

## 2018-07-12 RX ORDER — ATORVASTATIN CALCIUM 10 MG/1
10 TABLET, FILM COATED ORAL DAILY
Qty: 30 TABLET | Refills: 6 | Status: SHIPPED | OUTPATIENT
Start: 2018-07-12 | End: 2019-01-21 | Stop reason: SDUPTHER

## 2018-07-12 RX ORDER — CLOPIDOGREL BISULFATE 75 MG/1
75 TABLET ORAL DAILY
Qty: 30 TABLET | Refills: 6 | Status: SHIPPED | OUTPATIENT
Start: 2018-07-12 | End: 2019-01-21 | Stop reason: SDUPTHER

## 2018-07-12 NOTE — PROGRESS NOTES
Subjective   Dominik Varma is a 72 y.o. male  Hypertension (RF lisinopril) and Hyperlipidemia (RF atorvastatin and Plavix)      History of Present Illness   Patient comes in today for 6 month follow-up on hypertension and hyperlipidemia and history of CVA.  He is doing very well and denies any problems at all.  He is still an active , stays active on the farm daily, denies any dizziness, no shortness of breath, no headache, no chest pain, no fatigue.  He states he feels great every day.  He states he is trying to watch the amount of sweets in his diet but he's had a sweet tooth his whole life.  He states he eats less sweets now that he used to.  He states he has a good appetite.  The following portions of the patient's history were reviewed and updated as appropriate: allergies, current medications, past social history and problem list    Review of Systems   Constitutional: Negative for fatigue and unexpected weight change.   Respiratory: Negative for cough, chest tightness and shortness of breath.    Cardiovascular: Negative for chest pain, palpitations and leg swelling.   Gastrointestinal: Negative for nausea.   Skin: Negative for color change and rash.   Neurological: Negative for dizziness, syncope, weakness and headaches.       Objective     Vitals:    07/12/18 0840   BP: 128/76   Pulse: 50   Resp: 18   Temp: 97.4 °F (36.3 °C)   SpO2: 99%       Physical Exam   Constitutional: He appears well-developed and well-nourished. No distress.   Neck: No JVD present.   Cardiovascular: Normal rate, regular rhythm, normal heart sounds and intact distal pulses.    No murmur heard.  Pulmonary/Chest: Effort normal and breath sounds normal. No respiratory distress. He exhibits no tenderness.   Abdominal: Soft. He exhibits no distension. There is no tenderness.   Musculoskeletal: He exhibits no edema.   Skin: Skin is warm and dry. He is not diaphoretic. No erythema. No pallor.   Psychiatric: He has a normal mood  and affect. His behavior is normal. Judgment and thought content normal.   Nursing note and vitals reviewed.      Assessment/Plan     Diagnoses and all orders for this visit:    Prediabetes    Secondary hypertension  -     lisinopril (PRINIVIL,ZESTRIL) 20 MG tablet; Take 1 tablet by mouth Daily.    Hyperlipidemia, unspecified hyperlipidemia type  -     atorvastatin (LIPITOR) 10 MG tablet; Take 1 tablet by mouth Daily.    Cerebrovascular disease  -     clopidogrel (PLAVIX) 75 MG tablet; Take 1 tablet by mouth Daily.    Follow-up in office in 6 months for recheck.  Will check labs at that time and EKG at that time.

## 2018-10-11 ENCOUNTER — HOSPITAL ENCOUNTER (EMERGENCY)
Facility: HOSPITAL | Age: 73
Discharge: HOME | End: 2018-10-11
Attending: EMERGENCY MEDICINE | Admitting: EMERGENCY MEDICINE

## 2018-10-11 ENCOUNTER — TELEPHONE (OUTPATIENT)
Dept: FAMILY MEDICINE CLINIC | Facility: CLINIC | Age: 73
End: 2018-10-11

## 2018-10-11 ENCOUNTER — APPOINTMENT (OUTPATIENT)
Dept: GENERAL RADIOLOGY | Facility: HOSPITAL | Age: 73
End: 2018-10-11

## 2018-10-11 VITALS
HEIGHT: 72 IN | OXYGEN SATURATION: 93 % | HEART RATE: 74 BPM | TEMPERATURE: 99.8 F | DIASTOLIC BLOOD PRESSURE: 94 MMHG | BODY MASS INDEX: 22.62 KG/M2 | WEIGHT: 167 LBS | RESPIRATION RATE: 20 BRPM | SYSTOLIC BLOOD PRESSURE: 104 MMHG

## 2018-10-11 DIAGNOSIS — R68.83 CHILLS: Primary | ICD-10-CM

## 2018-10-11 DIAGNOSIS — R07.89 ATYPICAL CHEST PAIN: ICD-10-CM

## 2018-10-11 LAB
ALBUMIN SERPL-MCNC: 3.6 G/DL (ref 3.5–5.2)
ALBUMIN/GLOB SERPL: 1.2 G/DL
ALP SERPL-CCNC: 160 U/L (ref 39–117)
ALT SERPL W P-5'-P-CCNC: 61 U/L (ref 1–41)
ANION GAP SERPL CALCULATED.3IONS-SCNC: 8 MMOL/L
AST SERPL-CCNC: 32 U/L (ref 1–40)
BACTERIA UR QL AUTO: NORMAL /HPF
BASOPHILS # BLD AUTO: 0.02 10*3/MM3 (ref 0–0.2)
BASOPHILS NFR BLD AUTO: 0.1 % (ref 0–1.5)
BILIRUB SERPL-MCNC: 0.5 MG/DL (ref 0.1–1.2)
BILIRUB UR QL STRIP: NEGATIVE
BUN BLD-MCNC: 15 MG/DL (ref 8–23)
BUN/CREAT SERPL: 15.5 (ref 7–25)
CALCIUM SPEC-SCNC: 9.4 MG/DL (ref 8.6–10.5)
CHLORIDE SERPL-SCNC: 105 MMOL/L (ref 98–107)
CLARITY UR: CLEAR
CO2 SERPL-SCNC: 23 MMOL/L (ref 22–29)
COLOR UR: YELLOW
CREAT BLD-MCNC: 0.97 MG/DL (ref 0.76–1.27)
D-LACTATE SERPL-SCNC: 1.2 MMOL/L (ref 0.5–2)
DEPRECATED RDW RBC AUTO: 47.3 FL (ref 37–54)
EOSINOPHIL # BLD AUTO: 0.03 10*3/MM3 (ref 0–0.7)
EOSINOPHIL NFR BLD AUTO: 0.2 % (ref 0.3–6.2)
ERYTHROCYTE [DISTWIDTH] IN BLOOD BY AUTOMATED COUNT: 14.2 % (ref 11.5–14.5)
GFR SERPL CREATININE-BSD FRML MDRD: 76 ML/MIN/1.73
GLOBULIN UR ELPH-MCNC: 3.1 GM/DL
GLUCOSE BLD-MCNC: 167 MG/DL (ref 65–99)
GLUCOSE UR STRIP-MCNC: NEGATIVE MG/DL
HCT VFR BLD AUTO: 34.2 % (ref 40.4–52.2)
HGB BLD-MCNC: 10.8 G/DL (ref 13.7–17.6)
HGB UR QL STRIP.AUTO: NEGATIVE
HOLD SPECIMEN: NORMAL
HOLD SPECIMEN: NORMAL
HYALINE CASTS UR QL AUTO: NORMAL /LPF
IMM GRANULOCYTES # BLD: 0.04 10*3/MM3 (ref 0–0.03)
IMM GRANULOCYTES NFR BLD: 0.3 % (ref 0–0.5)
KETONES UR QL STRIP: NEGATIVE
LEUKOCYTE ESTERASE UR QL STRIP.AUTO: ABNORMAL
LYMPHOCYTES # BLD AUTO: 1.12 10*3/MM3 (ref 0.9–4.8)
LYMPHOCYTES NFR BLD AUTO: 7.3 % (ref 19.6–45.3)
MCH RBC QN AUTO: 28.6 PG (ref 27–32.7)
MCHC RBC AUTO-ENTMCNC: 31.6 G/DL (ref 32.6–36.4)
MCV RBC AUTO: 90.7 FL (ref 79.8–96.2)
MONOCYTES # BLD AUTO: 1.42 10*3/MM3 (ref 0.2–1.2)
MONOCYTES NFR BLD AUTO: 9.2 % (ref 5–12)
NEUTROPHILS # BLD AUTO: 12.84 10*3/MM3 (ref 1.9–8.1)
NEUTROPHILS NFR BLD AUTO: 83.2 % (ref 42.7–76)
NITRITE UR QL STRIP: NEGATIVE
PH UR STRIP.AUTO: 6 [PH] (ref 5–8)
PLATELET # BLD AUTO: 302 10*3/MM3 (ref 140–500)
PMV BLD AUTO: 11.2 FL (ref 6–12)
POTASSIUM BLD-SCNC: 4.6 MMOL/L (ref 3.5–5.2)
PROCALCITONIN SERPL-MCNC: 0.04 NG/ML (ref 0.1–0.25)
PROT SERPL-MCNC: 6.7 G/DL (ref 6–8.5)
PROT UR QL STRIP: ABNORMAL
RBC # BLD AUTO: 3.77 10*6/MM3 (ref 4.6–6)
RBC # UR: NORMAL /HPF
REF LAB TEST METHOD: NORMAL
SODIUM BLD-SCNC: 136 MMOL/L (ref 136–145)
SP GR UR STRIP: 1.02 (ref 1–1.03)
SQUAMOUS #/AREA URNS HPF: NORMAL /HPF
TROPONIN T SERPL-MCNC: <0.01 NG/ML (ref 0–0.03)
UROBILINOGEN UR QL STRIP: ABNORMAL
WBC NRBC COR # BLD: 15.43 10*3/MM3 (ref 4.5–10.7)
WBC UR QL AUTO: NORMAL /HPF
WHOLE BLOOD HOLD SPECIMEN: NORMAL
WHOLE BLOOD HOLD SPECIMEN: NORMAL

## 2018-10-11 PROCEDURE — 93005 ELECTROCARDIOGRAM TRACING: CPT | Performed by: EMERGENCY MEDICINE

## 2018-10-11 PROCEDURE — 71046 X-RAY EXAM CHEST 2 VIEWS: CPT

## 2018-10-11 PROCEDURE — 83605 ASSAY OF LACTIC ACID: CPT | Performed by: PHYSICIAN ASSISTANT

## 2018-10-11 PROCEDURE — 80053 COMPREHEN METABOLIC PANEL: CPT | Performed by: PHYSICIAN ASSISTANT

## 2018-10-11 PROCEDURE — 93010 ELECTROCARDIOGRAM REPORT: CPT | Performed by: INTERNAL MEDICINE

## 2018-10-11 PROCEDURE — 99284 EMERGENCY DEPT VISIT MOD MDM: CPT

## 2018-10-11 PROCEDURE — 84484 ASSAY OF TROPONIN QUANT: CPT | Performed by: EMERGENCY MEDICINE

## 2018-10-11 PROCEDURE — 81001 URINALYSIS AUTO W/SCOPE: CPT | Performed by: PHYSICIAN ASSISTANT

## 2018-10-11 PROCEDURE — 84145 PROCALCITONIN (PCT): CPT | Performed by: EMERGENCY MEDICINE

## 2018-10-11 PROCEDURE — 85025 COMPLETE CBC W/AUTO DIFF WBC: CPT | Performed by: PHYSICIAN ASSISTANT

## 2018-10-11 PROCEDURE — 93005 ELECTROCARDIOGRAM TRACING: CPT

## 2018-10-11 RX ORDER — SODIUM CHLORIDE 0.9 % (FLUSH) 0.9 %
10 SYRINGE (ML) INJECTION AS NEEDED
Status: DISCONTINUED | OUTPATIENT
Start: 2018-10-11 | End: 2018-10-11 | Stop reason: HOSPADM

## 2018-10-11 NOTE — ED PROVIDER NOTES
" EMERGENCY DEPARTMENT ENCOUNTER    CHIEF COMPLAINT  Chief Complaint: Chills  History given by: Pt  History limited by: None  Room Number:   PMD: Kassidy Bolton PA-C      HPI:  Pt is a 73 y.o. male who presents complaining of intermittent chills that began 2 days ago with recurrent episode yesterday and today. Pt also  c/o an episode of waxing and waning CP at 5:30 AM that lasted 5hrs. This pain was worsening with movement but did not radiate. Pt describes pain as \"severe indigestion\". Pt states h/o similar CP and had a negative cardiac workup. Pt denies HA, sore throat, abd pain, vomiting, diarrhea, hematochezia, dysuria, and leg pain/swelling. Pt is on Plavix.    Duration/Onset/Timin days/gradual/intermittent   Quality: chills  Intensity/Severity: moderate  Associated Symptoms: episode of CP  Aggravating or Alleviating Factors: none stated  Previous Episodes: Pt states history of similar CP, he had a negative cardiac workup.       PAST MEDICAL HISTORY  Active Ambulatory Problems     Diagnosis Date Noted   • Cerebrovascular disease 2016   • Hyperlipidemia 2016   • Essential hypertension, benign 2016   • Abnormal stress test 10/16/2017     Resolved Ambulatory Problems     Diagnosis Date Noted   • No Resolved Ambulatory Problems     Past Medical History:   Diagnosis Date   • Cerumen impaction    • Hyperlipidemia    • Hypertension    • Impaired fasting glucose    • Inguinal hernia, right    • Occlusion and stenosis of unspecified carotid artery        PAST SURGICAL HISTORY  Past Surgical History:   Procedure Laterality Date   • CARDIAC CATHETERIZATION N/A 10/17/2017    Procedure: Left Heart Cath;  Surgeon: Zach Phoenix MD;  [facility];  Service:    • CEREBRAL ANGIOGRAM     • HERNIA REPAIR         FAMILY HISTORY  Family History   Problem Relation Age of Onset   • Cancer Mother    • Hypertension Father    • Heart disease Father        SOCIAL " HISTORY  Social History     Social History   • Marital status:      Spouse name: N/A   • Number of children: N/A   • Years of education: N/A     Occupational History   • Not on file.     Social History Main Topics   • Smoking status: Former Smoker     Packs/day: 1.00     Years: 40.00     Types: Cigarettes     Quit date: 10/2017   • Smokeless tobacco: Never Used   • Alcohol use No   • Drug use: No   • Sexual activity: Defer     Other Topics Concern   • Not on file     Social History Narrative   • No narrative on file       ALLERGIES  Patient has no known allergies.    REVIEW OF SYSTEMS  Review of Systems   Constitutional: Positive for chills. Negative for fever.   HENT: Negative.  Negative for sore throat.    Eyes: Negative.    Respiratory: Negative.  Negative for cough.    Cardiovascular: Positive for chest pain (waxing and waning, resolved ). Negative for leg swelling.   Gastrointestinal: Negative.  Negative for abdominal pain, blood in stool, diarrhea and vomiting.   Genitourinary: Negative.  Negative for dysuria.   Musculoskeletal: Negative.  Negative for back pain.   Skin: Negative.  Negative for rash.   Neurological: Negative.  Negative for headaches.   All other systems reviewed and are negative.      PHYSICAL EXAM  ED Triage Vitals   Temp Heart Rate Resp BP SpO2   10/11/18 0957 10/11/18 0957 10/11/18 0957 10/11/18 1025 10/11/18 0957   98.5 °F (36.9 °C) 86 18 139/93 96 %      Temp src Heart Rate Source Patient Position BP Location FiO2 (%)   10/11/18 0957 -- -- -- --   Tympanic           Physical Exam   Constitutional: He is well-developed, well-nourished, and in no distress. No distress.   Well appearing    HENT:   Head: Normocephalic and atraumatic.   Mouth/Throat: Oropharynx is clear and moist.   Eyes: Conjunctivae are normal.   Cardiovascular: Normal rate and regular rhythm.  Exam reveals no gallop and no friction rub.    No murmur heard.  Pulmonary/Chest: Breath sounds normal. No respiratory  distress.   Abdominal: There is no tenderness.   Musculoskeletal: He exhibits no edema or tenderness.   Neurological: He is alert.   Skin: No rash noted.   Nursing note and vitals reviewed.      LAB RESULTS  Lab Results (last 24 hours)     Procedure Component Value Units Date/Time    CBC & Differential [[id number]] Collected:  10/11/18 1036    Specimen:  Blood Updated:  10/11/18 1052    Narrative:       The following orders were created for panel order CBC & Differential.  Procedure                               Abnormality         Status                     ---------                               -----------         ------                     CBC Auto Differential[[id number]]        Abnormal            Final result                 Please view results for these tests on the individual orders.    Comprehensive Metabolic Panel [[id number]]  (Abnormal) Collected:  10/11/18 1036    Specimen:  Blood Updated:  10/11/18 1106     Glucose 167 (H) mg/dL      BUN 15 mg/dL      Creatinine 0.97 mg/dL      Sodium 136 mmol/L      Potassium 4.6 mmol/L      Chloride 105 mmol/L      CO2 23.0 mmol/L      Calcium 9.4 mg/dL      Total Protein 6.7 g/dL      Albumin 3.60 g/dL      ALT (SGPT) 61 (H) U/L      AST (SGOT) 32 U/L      Alkaline Phosphatase 160 (H) U/L      Total Bilirubin 0.5 mg/dL      eGFR Non African Amer 76 mL/min/1.73      Globulin 3.1 gm/dL      A/G Ratio 1.2 g/dL      BUN/Creatinine Ratio 15.5     Anion Gap 8.0 mmol/L     Narrative:       The MDRD GFR formula is only valid for adults with stable renal function between ages 18 and 70.    CBC Auto Differential [[id number]]  (Abnormal) Collected:  10/11/18 1036    Specimen:  Blood Updated:  10/11/18 1052     WBC 15.43 (H) 10*3/mm3      RBC 3.77 (L) 10*6/mm3      Hemoglobin 10.8 (L) g/dL      Hematocrit 34.2 (L) %      MCV 90.7 fL      MCH 28.6 pg      MCHC 31.6 (L) g/dL      RDW 14.2 %      RDW-SD 47.3 fl      MPV 11.2 fL      Platelets 302 10*3/mm3      Neutrophil % 83.2 (H) %  "     Lymphocyte % 7.3 (L) %      Monocyte % 9.2 %      Eosinophil % 0.2 (L) %      Basophil % 0.1 %      Immature Grans % 0.3 %      Neutrophils, Absolute 12.84 (H) 10*3/mm3      Lymphocytes, Absolute 1.12 10*3/mm3      Monocytes, Absolute 1.42 (H) 10*3/mm3      Eosinophils, Absolute 0.03 10*3/mm3      Basophils, Absolute 0.02 10*3/mm3      Immature Grans, Absolute 0.04 (H) 10*3/mm3     Procalcitonin [[id number]]  (Abnormal) Collected:  10/11/18 1036    Specimen:  Blood Updated:  10/11/18 1313     Procalcitonin 0.04 (L) ng/mL     Narrative:       As a Marker for Sepsis (Non-Neonates):   1. <0.5 ng/mL represents a low risk of severe sepsis and/or septic shock.  1. >2 ng/mL represents a high risk of severe sepsis and/or septic shock.    As a Marker for Lower Respiratory Tract Infections that require antibiotic therapy:  PCT on Admission     Antibiotic Therapy             6-12 Hrs later  > 0.5                Strongly Recommended            >0.25 - <0.5         Recommended  0.1 - 0.25           Discouraged                   Remeasure/reassess PCT  <0.1                 Strongly Discouraged          Remeasure/reassess PCT      As 28 day mortality risk marker: \"Change in Procalcitonin Result\" (> 80 % or <=80 %) if Day 0 (or Day 1) and Day 4 values are available. Refer to http://www.[facility]-pct-calculator.com/   Change in PCT <=80 %   A decrease of PCT levels below or equal to 80 % defines a positive change in PCT test result representing a higher risk for 28-day all-cause mortality of patients diagnosed with severe sepsis or septic shock.  Change in PCT > 80 %   A decrease of PCT levels of more than 80 % defines a negative change in PCT result representing a lower risk for 28-day all-cause mortality of patients diagnosed with severe sepsis or septic shock.                Troponin [[id number]]  (Normal) Collected:  10/11/18 1036    Specimen:  Blood Updated:  10/11/18 1307     Troponin T <0.010 ng/mL     Narrative:       " Troponin T Reference Ranges:  Less than 0.03 ng/mL:    Negative for AMI  0.03 to 0.09 ng/mL:      Indeterminant for AMI  Greater than 0.09 ng/mL: Positive for AMI    Lactic Acid, Plasma [[id number]]  (Normal) Collected:  10/11/18 1159    Specimen:  Blood Updated:  10/11/18 1225     Lactate 1.2 mmol/L     Urinalysis With Microscopic If Indicated (No Culture) - Urine, Clean Catch [[id number]]  (Abnormal) Collected:  10/11/18 1159    Specimen:  Urine from Urine, Clean Catch Updated:  10/11/18 1242     Color, UA Yellow     Appearance, UA Clear     pH, UA 6.0     Specific Gravity, UA 1.023     Glucose, UA Negative     Ketones, UA Negative     Bilirubin, UA Negative     Blood, UA Negative     Protein, UA Trace (A)     Leuk Esterase, UA Trace (A)     Nitrite, UA Negative     Urobilinogen, UA 1.0 E.U./dL    Urinalysis, Microscopic Only - Urine, Clean Catch [[id number]] Collected:  10/11/18 1159    Specimen:  Urine from Urine, Clean Catch Updated:  10/11/18 1242     RBC, UA 0-2 /HPF      WBC, UA 0-2 /HPF      Bacteria, UA None Seen /HPF      Squamous Epithelial Cells, UA 0-2 /HPF      Hyaline Casts, UA None Seen /LPF      Methodology Automated Microscopy          I ordered the above labs and reviewed the results    RADIOLOGY  XR Chest 2 View   Final Result       There are no prior chest x-rays for comparison. There is evidence of   prominent emphysematous changes in lungs most pronounced in the right   lung apex and there are coarsened interstitial markings at lung bases   suggesting there may be an element of interstitial fibrosis and there is   blunting of the posterior costophrenic angles bilaterally that I favor   is scarring over small effusions. No additional active disease is seen   in the chest.       This report was finalized on 10/11/2018 12:42 PM by Dr. Ben Ashley M.D.               I ordered the above noted radiological studies. Interpreted by radiologist. Reviewed by me in PACS.  "      PROCEDURES  Procedures  EKG           EKG time: 10:07 AM  Rhythm/Rate: NSR 81  P waves and PA: normal  QRS, axis: LAD, LAFB   ST and T waves: non specific changes     Interpreted Contemporaneously by me, independently viewed  unchanged compared to prior 5/2018      PROGRESS AND CONSULTS  ED Course as of Oct 11 1328   Thu Oct 11, 2018   1040 Chills x 2 days with cough.   [KA]   1237 12:38 PM  74 yo  in town presents with chills off and on for last 3 days.  Had sharp CP this AM that he described as \"indigestion\", now gone.  On exam he is well appearing in NAD.  I see no obvious focus for infxn at this point and do NOT believe that CXR represents PNA.  Will check UA, trop, and PCT.    [DB]      ED Course User Index  [DB] Rolo De La Cruz MD  [KA] Nadia Hall PA     12:32 PM  Informed pt of normal EKG and labs.   Troponin and procalcitonin labs ordered for evaluation.     12:36 PM  Rechecked pt who is resting in NAD. Discussed CXR and additional labs.     1:23 PM  Rechecked pt who is resting in NAD. Discussed normal labs. Discussed plan to discharge. Pt understands and agrees with the plan, all questions answered. Advised pt to rest and f/u with PCP if sx persist. RTER instructions given.       MEDICAL DECISION MAKING  Results were reviewed/discussed with the patient and they were also made aware of online access. Pt also made aware that some labs, such as cultures, will not be resulted during ER visit and follow up with PMD is necessary.     MDM  Number of Diagnoses or Management Options     Amount and/or Complexity of Data Reviewed  Clinical lab tests: reviewed (WBC-15.43  Troponin is <0.010  Hemoglobin-10.8  Glucose-167  Lactate-1.2  Negative UA)  Tests in the radiology section of CPT®: reviewed and ordered (CXR-prominent emphysematous changes mostly in right lung apex)  Tests in the medicine section of CPT®: reviewed (See EKG note)  Decide to obtain previous medical records or to obtain " history from someone other than the patient: yes  Review and summarize past medical records: yes           DIAGNOSIS  Final diagnoses:   Chills   Atypical chest pain       DISPOSITION  DISCHARGE    Patient discharged in stable condition.    Reviewed implications of results, diagnosis, meds, responsibility to follow up, warning signs and symptoms of possible worsening, potential complications and reasons to return to ER.    Patient/Family voiced understanding of above instructions.    Discussed plan for discharge, as there is no emergent indication for admission. Patient referred to primary care provider for BP management due to today's BP. Pt/family is agreeable and understands need for follow up and repeat testing.  Pt is aware that discharge does not mean that nothing is wrong but it indicates no emergency is present that requires admission and they must continue care with follow-up as given below or physician of their choice.     FOLLOW-UP  Kassidy Bolton, SHEILA  [address] [id number]  [phone]    In 1 week  If Not Better         Medication List      Stop    meclizine 12.5 MG tablet  Commonly known as:  ANTIVERT     TWINRIX 720-20 ELU-MCG/ML injection  Generic drug:  hepatitis A-hepatitis B              Latest Documented Vital Signs:  As of 1:28 PM  BP- 122/82 HR- 77 Temp- 98.5 °F (36.9 °C) (Tympanic) O2 sat- 93%    --  Documentation assistance provided by elder Pat for Dr. De La Cruz.  Information recorded by the scralysone was done at my direction and has been verified and validated by me.     Shakila Pat  10/11/18 6085       Rolo De La Cruz MD  10/11/18 2326

## 2018-10-11 NOTE — ED NOTES
"Pt reports subjective feelings of \"chilling\" but was unable to check temp at home. Pt reports chills since Tuesday evening. Pt also reports some chest pain that he \"thinks might be indigestion.\"     Jolie Garza RN  10/11/18 1038    "

## 2018-10-15 ENCOUNTER — TRANSCRIBE ORDERS (OUTPATIENT)
Dept: FAMILY MEDICINE CLINIC | Facility: CLINIC | Age: 73
End: 2018-10-15

## 2018-10-15 DIAGNOSIS — K21.9 CHRONIC GERD: Primary | ICD-10-CM

## 2018-10-17 ENCOUNTER — LAB (OUTPATIENT)
Dept: LAB | Facility: HOSPITAL | Age: 73
End: 2018-10-17

## 2018-10-17 ENCOUNTER — HOSPITAL ENCOUNTER (OUTPATIENT)
Dept: GENERAL RADIOLOGY | Facility: HOSPITAL | Age: 73
Discharge: HOME | End: 2018-10-17
Admitting: PHYSICIAN ASSISTANT

## 2018-10-17 ENCOUNTER — OFFICE VISIT (OUTPATIENT)
Dept: FAMILY MEDICINE CLINIC | Facility: CLINIC | Age: 73
End: 2018-10-17

## 2018-10-17 VITALS
SYSTOLIC BLOOD PRESSURE: 124 MMHG | HEART RATE: 64 BPM | HEIGHT: 72 IN | OXYGEN SATURATION: 99 % | TEMPERATURE: 97.8 F | BODY MASS INDEX: 22.89 KG/M2 | DIASTOLIC BLOOD PRESSURE: 80 MMHG | WEIGHT: 169 LBS

## 2018-10-17 DIAGNOSIS — R05.9 COUGH: ICD-10-CM

## 2018-10-17 DIAGNOSIS — R53.83 FATIGUE, UNSPECIFIED TYPE: ICD-10-CM

## 2018-10-17 DIAGNOSIS — R50.9 FEVER, UNSPECIFIED FEVER CAUSE: ICD-10-CM

## 2018-10-17 DIAGNOSIS — D64.9 ANEMIA, UNSPECIFIED TYPE: ICD-10-CM

## 2018-10-17 DIAGNOSIS — R05.9 COUGH: Primary | ICD-10-CM

## 2018-10-17 LAB
BASOPHILS # BLD AUTO: 0.02 10*3/MM3 (ref 0–0.2)
BASOPHILS NFR BLD AUTO: 0.2 % (ref 0–1)
DEPRECATED RDW RBC AUTO: 47.6 FL (ref 37–54)
EOSINOPHIL # BLD AUTO: 0.51 10*3/MM3 (ref 0–0.3)
EOSINOPHIL NFR BLD AUTO: 5.9 % (ref 0–3)
ERYTHROCYTE [DISTWIDTH] IN BLOOD BY AUTOMATED COUNT: 14.6 % (ref 11.3–14.5)
HCT VFR BLD AUTO: 37.4 % (ref 38.9–50.9)
HGB BLD-MCNC: 11.6 G/DL (ref 13.1–17.5)
IMM GRANULOCYTES # BLD: 0.03 10*3/MM3 (ref 0–0.03)
IMM GRANULOCYTES NFR BLD: 0.3 % (ref 0–0.6)
IRON 24H UR-MRATE: 22 MCG/DL (ref 50–175)
LYMPHOCYTES # BLD AUTO: 1.54 10*3/MM3 (ref 0.6–4.8)
LYMPHOCYTES NFR BLD AUTO: 17.9 % (ref 24–44)
MCH RBC QN AUTO: 27.9 PG (ref 27–31)
MCHC RBC AUTO-ENTMCNC: 31 G/DL (ref 32–36)
MCV RBC AUTO: 89.9 FL (ref 80–99)
MONOCYTES # BLD AUTO: 0.54 10*3/MM3 (ref 0–1)
MONOCYTES NFR BLD AUTO: 6.3 % (ref 0–12)
NEUTROPHILS # BLD AUTO: 6.01 10*3/MM3 (ref 1.5–8.3)
NEUTROPHILS NFR BLD AUTO: 69.7 % (ref 41–71)
PLATELET # BLD AUTO: 409 10*3/MM3 (ref 150–450)
PMV BLD AUTO: 11.5 FL (ref 6–12)
RBC # BLD AUTO: 4.16 10*6/MM3 (ref 4.2–5.76)
VIT B12 BLD-MCNC: 1274 PG/ML (ref 211–911)
WBC NRBC COR # BLD: 8.62 10*3/MM3 (ref 3.5–10.8)

## 2018-10-17 PROCEDURE — 99214 OFFICE O/P EST MOD 30 MIN: CPT | Performed by: PHYSICIAN ASSISTANT

## 2018-10-17 PROCEDURE — 71046 X-RAY EXAM CHEST 2 VIEWS: CPT

## 2018-10-17 PROCEDURE — 83540 ASSAY OF IRON: CPT

## 2018-10-17 PROCEDURE — 36415 COLL VENOUS BLD VENIPUNCTURE: CPT

## 2018-10-17 PROCEDURE — 85025 COMPLETE CBC W/AUTO DIFF WBC: CPT

## 2018-10-17 PROCEDURE — 82607 VITAMIN B-12: CPT

## 2018-10-17 RX ORDER — CEFUROXIME AXETIL 500 MG/1
TABLET ORAL
Qty: 14 TABLET | Refills: 0 | Status: SHIPPED | OUTPATIENT
Start: 2018-10-17 | End: 2019-01-21

## 2018-10-17 RX ORDER — OMEPRAZOLE 20 MG/1
20 CAPSULE, DELAYED RELEASE ORAL DAILY
COMMUNITY
End: 2019-01-21 | Stop reason: ALTCHOICE

## 2018-10-17 NOTE — PROGRESS NOTES
Subjective   Dominik Varma is a 73 y.o. male  Chest Pain (Follow up from  ED from 10/11/2018 for Atypical chest pain and chills )      History of Present Illness  Patient comes in for ER follow-up he states that last Tuesday evening he started having chills and sweats associated with fatigue, by Wednesday morning and had worsened where he felt shaking chills and a cough.  He states after this lasted until Thursday morning he went to the ER because he was experiencing pain in his mid upper abdomen along with it.  He had a chest x-ray and labs done, negative cardiac workup, CBC showed decreased hemoglobin at 10.8 elevated white count at 15 and some chronic changes in his chest x-ray he was told that they couldn't find any cause for the infection and recommended a follow-up with our office for further evaluation of symptoms persisted.  He states since that time he has continued to feel fatigue, no dizziness or lightheadedness, no further abdominal pain he is on Prilosec for GERD.  Denies any blood in stools or melena.  He states he has continued to have a cough but lessened amount of chills.  The following portions of the patient's history were reviewed and updated as appropriate: allergies, current medications, past social history and problem list    Review of Systems   Constitutional: Positive for fatigue.   HENT: Negative.    Eyes: Negative.    Respiratory: Positive for cough. Negative for apnea, choking, chest tightness, shortness of breath and wheezing.    Cardiovascular: Negative for chest pain and palpitations.   Gastrointestinal: Positive for abdominal pain.   Genitourinary: Negative.    Allergic/Immunologic: Negative.    Neurological: Negative.    Hematological: Negative.    Psychiatric/Behavioral: Negative.        Objective     Vitals:    10/17/18 1011   BP: 124/80   Pulse: 64   Temp: 97.8 °F (36.6 °C)   SpO2: 99%       Physical Exam   Constitutional: He is oriented to person, place, and time. He appears  well-developed and well-nourished. He is active.  Non-toxic appearance. He does not have a sickly appearance. He does not appear ill. No distress.   HENT:   Head: Normocephalic and atraumatic.   Nose: Nose normal.   Mouth/Throat: Oropharynx is clear and moist.   Neck: Neck supple. No JVD present.   Cardiovascular: Normal rate, regular rhythm and normal heart sounds.    No murmur heard.  Pulmonary/Chest: Effort normal. No stridor. No respiratory distress. He has wheezes.   Abdominal: Soft. There is no tenderness.   Musculoskeletal: He exhibits no edema.   Lymphadenopathy:     He has no cervical adenopathy.   Neurological: He is alert and oriented to person, place, and time. No cranial nerve deficit. Coordination normal.   Skin: No rash noted. He is not diaphoretic. No erythema. No pallor.   Psychiatric: He has a normal mood and affect. His behavior is normal.   Nursing note and vitals reviewed.      Assessment/Plan     Diagnoses and all orders for this visit:    Cough  -     CBC & Differential; Future  -     XR Chest PA & Lateral; Future    Fever, unspecified fever cause  -     CBC & Differential; Future  -     XR Chest PA & Lateral; Future    Fatigue, unspecified type  -     CBC & Differential; Future  -     XR Chest PA & Lateral; Future  -     Vitamin B12; Future    Anemia, unspecified type  -     Vitamin B12; Future  -     Iron; Future    Other orders  -     omeprazole (priLOSEC) 20 MG capsule; Take 20 mg by mouth Daily.  -     cefuroxime (CEFTIN) 500 MG tablet; Take one twice daily for 7 days    I will contact patient after receiving his lab and x-ray reports.

## 2018-10-30 ENCOUNTER — OUTSIDE FACILITY SERVICE (OUTPATIENT)
Dept: GASTROENTEROLOGY | Facility: CLINIC | Age: 73
End: 2018-10-30

## 2018-10-30 ENCOUNTER — LAB REQUISITION (OUTPATIENT)
Dept: LAB | Facility: HOSPITAL | Age: 73
End: 2018-10-30

## 2018-10-30 DIAGNOSIS — K21.9 GASTRO-ESOPHAGEAL REFLUX DISEASE WITHOUT ESOPHAGITIS: ICD-10-CM

## 2018-10-30 DIAGNOSIS — K20.90 ESOPHAGITIS: ICD-10-CM

## 2018-10-30 PROCEDURE — 88305 TISSUE EXAM BY PATHOLOGIST: CPT | Performed by: INTERNAL MEDICINE

## 2018-10-30 PROCEDURE — 43239 EGD BIOPSY SINGLE/MULTIPLE: CPT | Performed by: INTERNAL MEDICINE

## 2018-10-31 LAB
CYTO UR: NORMAL
LAB AP CASE REPORT: NORMAL
LAB AP CLINICAL INFORMATION: NORMAL
PATH REPORT.FINAL DX SPEC: NORMAL
PATH REPORT.GROSS SPEC: NORMAL

## 2018-11-01 ENCOUNTER — OFFICE VISIT (OUTPATIENT)
Dept: FAMILY MEDICINE CLINIC | Facility: CLINIC | Age: 73
End: 2018-11-01

## 2018-11-01 ENCOUNTER — LAB (OUTPATIENT)
Dept: LAB | Facility: HOSPITAL | Age: 73
End: 2018-11-01

## 2018-11-01 ENCOUNTER — HOSPITAL ENCOUNTER (OUTPATIENT)
Dept: GENERAL RADIOLOGY | Facility: HOSPITAL | Age: 73
Discharge: HOME | End: 2018-11-01
Admitting: PHYSICIAN ASSISTANT

## 2018-11-01 VITALS
DIASTOLIC BLOOD PRESSURE: 78 MMHG | WEIGHT: 168 LBS | HEART RATE: 92 BPM | SYSTOLIC BLOOD PRESSURE: 124 MMHG | HEIGHT: 72 IN | TEMPERATURE: 98.1 F | OXYGEN SATURATION: 98 % | BODY MASS INDEX: 22.75 KG/M2

## 2018-11-01 DIAGNOSIS — J18.9 COMMUNITY ACQUIRED PNEUMONIA OF LEFT LOWER LOBE OF LUNG: ICD-10-CM

## 2018-11-01 DIAGNOSIS — R06.00 DYSPNEA, UNSPECIFIED TYPE: Primary | ICD-10-CM

## 2018-11-01 DIAGNOSIS — K21.9 GASTROESOPHAGEAL REFLUX DISEASE, ESOPHAGITIS PRESENCE NOT SPECIFIED: ICD-10-CM

## 2018-11-01 DIAGNOSIS — R06.00 DYSPNEA, UNSPECIFIED TYPE: ICD-10-CM

## 2018-11-01 DIAGNOSIS — D50.9 IRON DEFICIENCY ANEMIA, UNSPECIFIED IRON DEFICIENCY ANEMIA TYPE: ICD-10-CM

## 2018-11-01 LAB
BASOPHILS # BLD AUTO: 0.03 10*3/MM3 (ref 0–0.2)
BASOPHILS NFR BLD AUTO: 0.4 % (ref 0–1)
DEPRECATED RDW RBC AUTO: 52.8 FL (ref 37–54)
EOSINOPHIL # BLD AUTO: 0.33 10*3/MM3 (ref 0–0.3)
EOSINOPHIL NFR BLD AUTO: 4.2 % (ref 0–3)
ERYTHROCYTE [DISTWIDTH] IN BLOOD BY AUTOMATED COUNT: 16.2 % (ref 11.3–14.5)
HCT VFR BLD AUTO: 39.9 % (ref 38.9–50.9)
HGB BLD-MCNC: 12.3 G/DL (ref 13.1–17.5)
IMM GRANULOCYTES # BLD: 0.02 10*3/MM3 (ref 0–0.03)
IMM GRANULOCYTES NFR BLD: 0.3 % (ref 0–0.6)
LYMPHOCYTES # BLD AUTO: 1.59 10*3/MM3 (ref 0.6–4.8)
LYMPHOCYTES NFR BLD AUTO: 20.1 % (ref 24–44)
MCH RBC QN AUTO: 27.6 PG (ref 27–31)
MCHC RBC AUTO-ENTMCNC: 30.8 G/DL (ref 32–36)
MCV RBC AUTO: 89.7 FL (ref 80–99)
MONOCYTES # BLD AUTO: 0.58 10*3/MM3 (ref 0–1)
MONOCYTES NFR BLD AUTO: 7.3 % (ref 0–12)
NEUTROPHILS # BLD AUTO: 5.37 10*3/MM3 (ref 1.5–8.3)
NEUTROPHILS NFR BLD AUTO: 68 % (ref 41–71)
PLATELET # BLD AUTO: 197 10*3/MM3 (ref 150–450)
PMV BLD AUTO: 12.2 FL (ref 6–12)
RBC # BLD AUTO: 4.45 10*6/MM3 (ref 4.2–5.76)
WBC NRBC COR # BLD: 7.9 10*3/MM3 (ref 3.5–10.8)

## 2018-11-01 PROCEDURE — 71046 X-RAY EXAM CHEST 2 VIEWS: CPT

## 2018-11-01 PROCEDURE — 85025 COMPLETE CBC W/AUTO DIFF WBC: CPT

## 2018-11-01 PROCEDURE — 99213 OFFICE O/P EST LOW 20 MIN: CPT | Performed by: PHYSICIAN ASSISTANT

## 2018-11-01 PROCEDURE — 36415 COLL VENOUS BLD VENIPUNCTURE: CPT

## 2018-11-01 NOTE — PROGRESS NOTES
Subjective   Dominik Varma is a 73 y.o. male  Heartburn (Follow up on GERD, and want5s results of Endoscopy with Dr. Quezada )      History of Present Illness  Patient comes in today for follow-up on pneumonia with cirrhosis shortness breath fatigue and GERD.  Please see previous office notes, he recently had his EGD which is essentially normal other than showing GERD.  He has completed his antibiotic states his energy is getting back to normal, denies any current shortness of breath, cough or fever, appetite has returned to normal.  The following portions of the patient's history were reviewed and updated as appropriate: allergies, current medications, past social history and problem list    Review of Systems   Constitutional: Positive for activity change and appetite change. Negative for chills, diaphoresis, fatigue, fever and unexpected weight change.        Activity and appetite have returned to normal   Respiratory: Negative.  Negative for cough, chest tightness and shortness of breath.    Gastrointestinal: Negative.  Negative for anal bleeding and blood in stool.   Genitourinary: Negative for hematuria.   Neurological: Negative.  Negative for dizziness.       Objective     Vitals:    11/01/18 0850   BP: 124/78   Pulse: 92   Temp: 98.1 °F (36.7 °C)   SpO2: 98%       Physical Exam   Constitutional: He appears well-developed and well-nourished. No distress.   HENT:   Head: Normocephalic and atraumatic.   Neck: No JVD present.   Cardiovascular: Normal rate, regular rhythm, normal heart sounds and intact distal pulses.    No murmur heard.  Pulmonary/Chest: Effort normal and breath sounds normal. No respiratory distress. He exhibits no tenderness.   Abdominal: Soft. He exhibits no distension. There is no tenderness.   Musculoskeletal: He exhibits no edema.   Skin: Skin is warm and dry. No rash noted. He is not diaphoretic. No erythema. No pallor.   Psychiatric: He has a normal mood and affect.   Nursing note and  vitals reviewed.      Assessment/Plan     Diagnoses and all orders for this visit:    Dyspnea, unspecified type  -     XR Chest PA & Lateral; Future    Iron deficiency anemia, unspecified iron deficiency anemia type  -     CBC & Differential; Future    Community acquired pneumonia of left lower lobe of lung (CMS/HCC)    Gastroesophageal reflux disease, esophagitis presence not specified    Other orders  -     TWINRIX 720-20 injection;     I'll contact patient with chest x-ray report and lab results after have reviewed them next week.

## 2019-01-21 ENCOUNTER — OFFICE VISIT (OUTPATIENT)
Dept: FAMILY MEDICINE CLINIC | Facility: CLINIC | Age: 74
End: 2019-01-21

## 2019-01-21 ENCOUNTER — LAB (OUTPATIENT)
Dept: LAB | Facility: HOSPITAL | Age: 74
End: 2019-01-21

## 2019-01-21 VITALS
DIASTOLIC BLOOD PRESSURE: 82 MMHG | HEART RATE: 59 BPM | SYSTOLIC BLOOD PRESSURE: 124 MMHG | OXYGEN SATURATION: 98 % | WEIGHT: 173 LBS | HEIGHT: 72 IN | TEMPERATURE: 97.9 F | BODY MASS INDEX: 23.43 KG/M2

## 2019-01-21 DIAGNOSIS — H61.23 BILATERAL IMPACTED CERUMEN: ICD-10-CM

## 2019-01-21 DIAGNOSIS — E78.5 HYPERLIPIDEMIA, UNSPECIFIED HYPERLIPIDEMIA TYPE: ICD-10-CM

## 2019-01-21 DIAGNOSIS — I15.9 SECONDARY HYPERTENSION: ICD-10-CM

## 2019-01-21 DIAGNOSIS — I10 ESSENTIAL HYPERTENSION: ICD-10-CM

## 2019-01-21 DIAGNOSIS — I67.9 CEREBROVASCULAR DISEASE: ICD-10-CM

## 2019-01-21 DIAGNOSIS — Z00.00 GENERAL MEDICAL EXAM: Primary | ICD-10-CM

## 2019-01-21 DIAGNOSIS — D64.9 ANEMIA, UNSPECIFIED TYPE: ICD-10-CM

## 2019-01-21 LAB
ANION GAP SERPL CALCULATED.3IONS-SCNC: 4 MMOL/L (ref 3–11)
ARTICHOKE IGE QN: 67 MG/DL (ref 0–130)
BASOPHILS # BLD AUTO: 0.03 10*3/MM3 (ref 0–0.2)
BASOPHILS NFR BLD AUTO: 0.2 % (ref 0–1)
BUN BLD-MCNC: 15 MG/DL (ref 9–23)
BUN/CREAT SERPL: 15.6 (ref 7–25)
CALCIUM SPEC-SCNC: 9.7 MG/DL (ref 8.7–10.4)
CHLORIDE SERPL-SCNC: 108 MMOL/L (ref 99–109)
CHOLEST SERPL-MCNC: 99 MG/DL (ref 0–200)
CO2 SERPL-SCNC: 26 MMOL/L (ref 20–31)
CREAT BLD-MCNC: 0.96 MG/DL (ref 0.6–1.3)
DEPRECATED RDW RBC AUTO: 58.4 FL (ref 37–54)
EOSINOPHIL # BLD AUTO: 0.89 10*3/MM3 (ref 0–0.3)
EOSINOPHIL NFR BLD AUTO: 7 % (ref 0–3)
ERYTHROCYTE [DISTWIDTH] IN BLOOD BY AUTOMATED COUNT: 17.7 % (ref 11.3–14.5)
GFR SERPL CREATININE-BSD FRML MDRD: 77 ML/MIN/1.73
GLUCOSE BLD-MCNC: 110 MG/DL (ref 70–100)
HCT VFR BLD AUTO: 44.8 % (ref 38.9–50.9)
HDLC SERPL-MCNC: 30 MG/DL (ref 40–60)
HGB BLD-MCNC: 14.2 G/DL (ref 13.1–17.5)
IMM GRANULOCYTES # BLD AUTO: 0.04 10*3/MM3 (ref 0–0.03)
IMM GRANULOCYTES NFR BLD AUTO: 0.3 % (ref 0–0.6)
LARGE PLATELETS: NORMAL
LYMPHOCYTES # BLD AUTO: 2.41 10*3/MM3 (ref 0.6–4.8)
LYMPHOCYTES NFR BLD AUTO: 19.1 % (ref 24–44)
MCH RBC QN AUTO: 28.5 PG (ref 27–31)
MCHC RBC AUTO-ENTMCNC: 31.7 G/DL (ref 32–36)
MCV RBC AUTO: 90 FL (ref 80–99)
MONOCYTES # BLD AUTO: 1.2 10*3/MM3 (ref 0–1)
MONOCYTES NFR BLD AUTO: 9.5 % (ref 0–12)
NEUTROPHILS # BLD AUTO: 8.12 10*3/MM3 (ref 1.5–8.3)
NEUTROPHILS NFR BLD AUTO: 64.2 % (ref 41–71)
PLATELET # BLD AUTO: 182 10*3/MM3 (ref 150–450)
PMV BLD AUTO: 12.3 FL (ref 6–12)
POTASSIUM BLD-SCNC: 4.9 MMOL/L (ref 3.5–5.5)
RBC # BLD AUTO: 4.98 10*6/MM3 (ref 4.2–5.76)
RBC MORPH BLD: NORMAL
SODIUM BLD-SCNC: 138 MMOL/L (ref 132–146)
TRIGL SERPL-MCNC: 47 MG/DL (ref 0–150)
WBC MORPH BLD: NORMAL
WBC NRBC COR # BLD: 12.65 10*3/MM3 (ref 3.5–10.8)

## 2019-01-21 PROCEDURE — 85007 BL SMEAR W/DIFF WBC COUNT: CPT

## 2019-01-21 PROCEDURE — 99397 PER PM REEVAL EST PAT 65+ YR: CPT | Performed by: PHYSICIAN ASSISTANT

## 2019-01-21 PROCEDURE — 80048 BASIC METABOLIC PNL TOTAL CA: CPT

## 2019-01-21 PROCEDURE — 36415 COLL VENOUS BLD VENIPUNCTURE: CPT

## 2019-01-21 PROCEDURE — 85025 COMPLETE CBC W/AUTO DIFF WBC: CPT

## 2019-01-21 PROCEDURE — 80061 LIPID PANEL: CPT

## 2019-01-21 RX ORDER — CLOPIDOGREL BISULFATE 75 MG/1
75 TABLET ORAL DAILY
Qty: 90 TABLET | Refills: 3 | Status: SHIPPED | OUTPATIENT
Start: 2019-01-21 | End: 2020-02-04 | Stop reason: SDUPTHER

## 2019-01-21 RX ORDER — LISINOPRIL 20 MG/1
20 TABLET ORAL DAILY
Qty: 90 TABLET | Refills: 1 | Status: SHIPPED | OUTPATIENT
Start: 2019-01-21 | End: 2019-07-22 | Stop reason: SDUPTHER

## 2019-01-21 RX ORDER — ATORVASTATIN CALCIUM 10 MG/1
10 TABLET, FILM COATED ORAL DAILY
Qty: 90 TABLET | Refills: 3 | Status: SHIPPED | OUTPATIENT
Start: 2019-01-21 | End: 2020-02-04 | Stop reason: SDUPTHER

## 2019-01-21 NOTE — PROGRESS NOTES
Subjective   Dominik Varma is a 73 y.o. male  Hypertension (Follow up on BP, req refills on Lisinopril ); Hyperlipidemia (Follow up on Cholesterol, req refills on Plavix and Atorvastatin ); Hearing Problem (Increased trouble hearing in both ears x6 weeks ); and Annual Exam      History of Present Illness  Patient presents today for a preventive medical visit.  Patient is here to determine screening labs and tests that are due and to determine immunization status as well.  Patient will be counseled regarding preventative medicine issues such as regular exercise and  healthy diet as well.    The following portions of the patient's history were reviewed and updated as appropriate: allergies, current medications, past social history and problem list    Review of Systems   Constitutional: Negative.    HENT: Positive for hearing loss.    Eyes: Negative.    Respiratory: Negative.    Cardiovascular: Negative.    Gastrointestinal: Negative.    Endocrine: Negative.    Genitourinary: Negative.    Musculoskeletal: Negative.    Skin: Negative.    Allergic/Immunologic: Negative.    Neurological: Negative.    Hematological: Negative.    Psychiatric/Behavioral: Negative.    All other systems reviewed and are negative.      Objective     Vitals:    01/21/19 0809   BP: 124/82   Pulse: 59   Temp: 97.9 °F (36.6 °C)   SpO2: 98%       Physical Exam   Constitutional: He is oriented to person, place, and time. He appears well-developed and well-nourished. No distress.   HENT:   Head: Normocephalic and atraumatic.   Right Ear: External ear normal. Decreased hearing is noted.   Left Ear: External ear normal. Decreased hearing is noted.   Nose: Nose normal.   Mouth/Throat: Oropharynx is clear and moist.   Cerumen impaction both ears after obtaining patient's consent both ear canals were irrigated with complete removal cerumen patient tolerated procedure well ear canals and TMs visualized as clear after procedure.   Eyes: Conjunctivae and EOM  are normal. Pupils are equal, round, and reactive to light.   Neck: Normal range of motion. Neck supple. No thyromegaly present.   Cardiovascular: Normal rate, regular rhythm, normal heart sounds and intact distal pulses.   No murmur heard.  Pulmonary/Chest: Effort normal and breath sounds normal.   Abdominal: Soft. Bowel sounds are normal. He exhibits no mass. There is no tenderness.   Genitourinary: Rectum normal, prostate normal and penis normal.   Musculoskeletal: Normal range of motion. He exhibits no edema.   Neurological: He is alert and oriented to person, place, and time. He has normal reflexes. He displays normal reflexes. No cranial nerve deficit. He exhibits normal muscle tone. Coordination normal.   Skin: Skin is warm and dry. He is not diaphoretic.   Psychiatric: He has a normal mood and affect.   Nursing note and vitals reviewed.    Discussed preventative medicine issues with patient including regular exercise, healthy diet, stress reduction, adequate sleep and recommended age-appropriate screening studies.  Assessment/Plan     Diagnoses and all orders for this visit:    General medical exam    Essential hypertension  -     lisinopril (PRINIVIL,ZESTRIL) 20 MG tablet; Take 1 tablet by mouth Daily.  -     Basic Metabolic Panel; Future    Hyperlipidemia, unspecified hyperlipidemia type  -     atorvastatin (LIPITOR) 10 MG tablet; Take 1 tablet by mouth Daily.  -     Lipid Panel; Future    Cerebrovascular disease  -     clopidogrel (PLAVIX) 75 MG tablet; Take 1 tablet by mouth Daily.  -     Basic Metabolic Panel; Future  -     Lipid Panel; Future    Anemia, unspecified type  -     CBC & Differential; Future    Bilateral impacted cerumen    Follow-up in 6 months for recheck.  Prescription written for shingrix

## 2019-07-22 ENCOUNTER — OFFICE VISIT (OUTPATIENT)
Dept: FAMILY MEDICINE CLINIC | Facility: CLINIC | Age: 74
End: 2019-07-22

## 2019-07-22 VITALS
OXYGEN SATURATION: 99 % | BODY MASS INDEX: 23.16 KG/M2 | DIASTOLIC BLOOD PRESSURE: 78 MMHG | HEART RATE: 62 BPM | TEMPERATURE: 98.2 F | WEIGHT: 171 LBS | HEIGHT: 72 IN | SYSTOLIC BLOOD PRESSURE: 126 MMHG

## 2019-07-22 DIAGNOSIS — I10 ESSENTIAL HYPERTENSION: ICD-10-CM

## 2019-07-22 PROCEDURE — 99213 OFFICE O/P EST LOW 20 MIN: CPT | Performed by: PHYSICIAN ASSISTANT

## 2019-07-22 RX ORDER — LISINOPRIL 20 MG/1
20 TABLET ORAL DAILY
Qty: 90 TABLET | Refills: 1 | Status: SHIPPED | OUTPATIENT
Start: 2019-07-22 | End: 2020-01-16

## 2019-07-22 NOTE — PROGRESS NOTES
Subjective   Dominik Varma is a 73 y.o. male  Hypertension (Follow up on blood pressure, req 90 day supply on Lisinopril )      History of Present Illness  Patient comes today for follow-up on hypertension doing well medication blood pressure well controlled denies any adverse effects medication.  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  The following portions of the patient's history were reviewed and updated as appropriate: allergies, current medications, past social history and problem list    Review of Systems   Constitutional: Negative for fatigue and unexpected weight change.   Respiratory: Negative for cough, chest tightness and shortness of breath.    Cardiovascular: Negative for chest pain, palpitations and leg swelling.   Gastrointestinal: Negative for nausea.   Skin: Negative for color change and rash.   Neurological: Negative for dizziness, syncope, weakness and headaches.       Objective     Vitals:    07/22/19 0843   BP: 126/78   Pulse: 62   Temp: 98.2 °F (36.8 °C)   SpO2: 99%       Physical Exam   Constitutional: He appears well-developed and well-nourished. No distress.   Neck: No JVD present.   Cardiovascular: Normal rate, regular rhythm, normal heart sounds and intact distal pulses.   No murmur heard.  Pulmonary/Chest: Effort normal and breath sounds normal. No respiratory distress. He exhibits no tenderness.   Abdominal: Soft. He exhibits no distension. There is no tenderness.   Musculoskeletal: He exhibits no edema.   Skin: Skin is warm and dry. He is not diaphoretic. No erythema. No pallor.   Psychiatric: He has a normal mood and affect.   Nursing note and vitals reviewed.      Assessment/Plan     Diagnoses and all orders for this visit:    Essential hypertension  -     lisinopril (PRINIVIL,ZESTRIL) 20 MG tablet; Take 1 tablet by mouth Daily.    Other orders  -     raNITIdine HCl (HEARTBURN RELIEF 150 MAX ST PO); Take  by mouth.

## 2020-01-15 DIAGNOSIS — I10 ESSENTIAL HYPERTENSION: ICD-10-CM

## 2020-01-16 RX ORDER — LISINOPRIL 20 MG/1
TABLET ORAL
Qty: 90 TABLET | Refills: 0 | Status: SHIPPED | OUTPATIENT
Start: 2020-01-16 | End: 2020-02-04 | Stop reason: SDUPTHER

## 2020-02-04 ENCOUNTER — LAB (OUTPATIENT)
Dept: LAB | Facility: HOSPITAL | Age: 75
End: 2020-02-04

## 2020-02-04 ENCOUNTER — OFFICE VISIT (OUTPATIENT)
Dept: FAMILY MEDICINE CLINIC | Facility: CLINIC | Age: 75
End: 2020-02-04

## 2020-02-04 VITALS
HEIGHT: 72 IN | WEIGHT: 174.2 LBS | BODY MASS INDEX: 23.6 KG/M2 | HEART RATE: 65 BPM | TEMPERATURE: 98.1 F | SYSTOLIC BLOOD PRESSURE: 134 MMHG | DIASTOLIC BLOOD PRESSURE: 80 MMHG | OXYGEN SATURATION: 99 %

## 2020-02-04 DIAGNOSIS — I67.9 CEREBROVASCULAR DISEASE: ICD-10-CM

## 2020-02-04 DIAGNOSIS — I10 ESSENTIAL HYPERTENSION: ICD-10-CM

## 2020-02-04 DIAGNOSIS — E78.5 HYPERLIPIDEMIA, UNSPECIFIED HYPERLIPIDEMIA TYPE: ICD-10-CM

## 2020-02-04 LAB
ANION GAP SERPL CALCULATED.3IONS-SCNC: 11.3 MMOL/L (ref 5–15)
BASOPHILS # BLD AUTO: 0.05 10*3/MM3 (ref 0–0.2)
BASOPHILS NFR BLD AUTO: 0.6 % (ref 0–1.5)
BUN BLD-MCNC: 17 MG/DL (ref 8–23)
BUN/CREAT SERPL: 21 (ref 7–25)
CALCIUM SPEC-SCNC: 9.8 MG/DL (ref 8.6–10.5)
CHLORIDE SERPL-SCNC: 104 MMOL/L (ref 98–107)
CHOLEST SERPL-MCNC: 94 MG/DL (ref 0–200)
CO2 SERPL-SCNC: 22.7 MMOL/L (ref 22–29)
CREAT BLD-MCNC: 0.81 MG/DL (ref 0.76–1.27)
DEPRECATED RDW RBC AUTO: 43.8 FL (ref 37–54)
EOSINOPHIL # BLD AUTO: 0.51 10*3/MM3 (ref 0–0.4)
EOSINOPHIL NFR BLD AUTO: 6.5 % (ref 0.3–6.2)
ERYTHROCYTE [DISTWIDTH] IN BLOOD BY AUTOMATED COUNT: 13.1 % (ref 12.3–15.4)
GFR SERPL CREATININE-BSD FRML MDRD: 93 ML/MIN/1.73
GLUCOSE BLD-MCNC: 118 MG/DL (ref 65–99)
HCT VFR BLD AUTO: 41.2 % (ref 37.5–51)
HDLC SERPL-MCNC: 31 MG/DL (ref 40–60)
HGB BLD-MCNC: 13.7 G/DL (ref 13–17.7)
IMM GRANULOCYTES # BLD AUTO: 0.03 10*3/MM3 (ref 0–0.05)
IMM GRANULOCYTES NFR BLD AUTO: 0.4 % (ref 0–0.5)
LDLC SERPL CALC-MCNC: 54 MG/DL (ref 0–100)
LDLC/HDLC SERPL: 1.74 {RATIO}
LYMPHOCYTES # BLD AUTO: 1.38 10*3/MM3 (ref 0.7–3.1)
LYMPHOCYTES NFR BLD AUTO: 17.7 % (ref 19.6–45.3)
MCH RBC QN AUTO: 30.2 PG (ref 26.6–33)
MCHC RBC AUTO-ENTMCNC: 33.3 G/DL (ref 31.5–35.7)
MCV RBC AUTO: 90.9 FL (ref 79–97)
MONOCYTES # BLD AUTO: 0.71 10*3/MM3 (ref 0.1–0.9)
MONOCYTES NFR BLD AUTO: 9.1 % (ref 5–12)
NEUTROPHILS # BLD AUTO: 5.11 10*3/MM3 (ref 1.7–7)
NEUTROPHILS NFR BLD AUTO: 65.7 % (ref 42.7–76)
NRBC BLD AUTO-RTO: 0 /100 WBC (ref 0–0.2)
PLATELET # BLD AUTO: 167 10*3/MM3 (ref 140–450)
PMV BLD AUTO: 12.3 FL (ref 6–12)
POTASSIUM BLD-SCNC: 4.6 MMOL/L (ref 3.5–5.2)
RBC # BLD AUTO: 4.53 10*6/MM3 (ref 4.14–5.8)
SODIUM BLD-SCNC: 138 MMOL/L (ref 136–145)
TRIGL SERPL-MCNC: 46 MG/DL (ref 0–150)
VLDLC SERPL-MCNC: 9.2 MG/DL (ref 5–40)
WBC NRBC COR # BLD: 7.79 10*3/MM3 (ref 3.4–10.8)

## 2020-02-04 PROCEDURE — 80048 BASIC METABOLIC PNL TOTAL CA: CPT

## 2020-02-04 PROCEDURE — 80061 LIPID PANEL: CPT

## 2020-02-04 PROCEDURE — 99213 OFFICE O/P EST LOW 20 MIN: CPT | Performed by: PHYSICIAN ASSISTANT

## 2020-02-04 PROCEDURE — 85025 COMPLETE CBC W/AUTO DIFF WBC: CPT

## 2020-02-04 PROCEDURE — 36415 COLL VENOUS BLD VENIPUNCTURE: CPT

## 2020-02-04 RX ORDER — CLOPIDOGREL BISULFATE 75 MG/1
75 TABLET ORAL DAILY
Qty: 90 TABLET | Refills: 3 | Status: SHIPPED | OUTPATIENT
Start: 2020-02-04 | End: 2021-02-01 | Stop reason: SDUPTHER

## 2020-02-04 RX ORDER — LISINOPRIL 20 MG/1
20 TABLET ORAL DAILY
Qty: 90 TABLET | Refills: 3 | Status: SHIPPED | OUTPATIENT
Start: 2020-02-04 | End: 2021-02-01 | Stop reason: SDUPTHER

## 2020-02-04 RX ORDER — ATORVASTATIN CALCIUM 10 MG/1
10 TABLET, FILM COATED ORAL DAILY
Qty: 90 TABLET | Refills: 3 | Status: SHIPPED | OUTPATIENT
Start: 2020-02-04 | End: 2021-02-01 | Stop reason: SDUPTHER

## 2020-02-04 NOTE — PROGRESS NOTES
I have reviewed the notes, assessments, and/or procedures performed by Kassidy Bolton PA-C, I concur with her documentation of Dominik Varma.

## 2020-02-04 NOTE — PROGRESS NOTES
Subjective   Dominik Varma is a 74 y.o. male  Med Refill (Req refills on Plavix and atorvastatin )      History of Present Illness  Patient comes in today for a 6-month follow-up on coronary artery disease, hypertension and hyperlipidemia.  He is feeling well still working full-time as a  and denies any health issues at this time.  He is due for labs.  He Ildefonso received a flu vaccine.  The following portions of the patient's history were reviewed and updated as appropriate: allergies, current medications, past social history and problem list    Review of Systems   Constitutional: Negative for fatigue and unexpected weight change.   Eyes: Negative.    Respiratory: Negative for cough, chest tightness and shortness of breath.    Cardiovascular: Negative for chest pain, palpitations and leg swelling.   Gastrointestinal: Negative for nausea.   Skin: Negative for color change and rash.   Neurological: Negative for dizziness, syncope, weakness and headaches.       Objective     Vitals:    02/04/20 1000   BP: 134/80   Pulse: 65   Temp: 98.1 °F (36.7 °C)   SpO2: 99%       Physical Exam   Constitutional: He appears well-developed and well-nourished. No distress.   Neck: No JVD present.   Cardiovascular: Normal rate, regular rhythm, normal heart sounds and intact distal pulses.   No murmur heard.  Pulmonary/Chest: Effort normal and breath sounds normal. No respiratory distress. He exhibits no tenderness.   Abdominal: Soft. He exhibits no distension. There is no tenderness.   Musculoskeletal: He exhibits no edema.   Skin: Skin is warm and dry. He is not diaphoretic. No erythema. No pallor.   Psychiatric: He has a normal mood and affect.   Nursing note and vitals reviewed.      Assessment/Plan     Dominik was seen today for med refill.    Diagnoses and all orders for this visit:    Cerebrovascular disease  -     clopidogrel (PLAVIX) 75 MG tablet; Take 1 tablet by mouth Daily.  -     CBC & Differential;  Future    Hyperlipidemia, unspecified hyperlipidemia type  -     atorvastatin (LIPITOR) 10 MG tablet; Take 1 tablet by mouth Daily.  -     Lipid Panel; Future    Essential hypertension  -     lisinopril (PRINIVIL,ZESTRIL) 20 MG tablet; Take 1 tablet by mouth Daily.  -     Basic Metabolic Panel; Future  -     CBC & Differential; Future    Will send letter on labs follow-up in 6 months for recheck.

## 2020-02-05 DIAGNOSIS — I67.9 CEREBROVASCULAR DISEASE: ICD-10-CM

## 2020-02-05 DIAGNOSIS — E78.5 HYPERLIPIDEMIA, UNSPECIFIED HYPERLIPIDEMIA TYPE: ICD-10-CM

## 2020-02-05 RX ORDER — ATORVASTATIN CALCIUM 10 MG/1
TABLET, FILM COATED ORAL
Qty: 90 TABLET | Refills: 2 | OUTPATIENT
Start: 2020-02-05

## 2020-02-05 RX ORDER — CLOPIDOGREL BISULFATE 75 MG/1
TABLET ORAL
Qty: 90 TABLET | Refills: 2 | OUTPATIENT
Start: 2020-02-05

## 2020-06-30 ENCOUNTER — OFFICE VISIT (OUTPATIENT)
Dept: FAMILY MEDICINE CLINIC | Facility: CLINIC | Age: 75
End: 2020-06-30

## 2020-06-30 VITALS
HEART RATE: 54 BPM | OXYGEN SATURATION: 98 % | BODY MASS INDEX: 23.61 KG/M2 | TEMPERATURE: 97.7 F | WEIGHT: 174.3 LBS | SYSTOLIC BLOOD PRESSURE: 114 MMHG | HEIGHT: 72 IN | DIASTOLIC BLOOD PRESSURE: 68 MMHG

## 2020-06-30 DIAGNOSIS — E78.2 MIXED HYPERLIPIDEMIA: ICD-10-CM

## 2020-06-30 DIAGNOSIS — Z00.00 GENERAL MEDICAL EXAM: Primary | ICD-10-CM

## 2020-06-30 DIAGNOSIS — I10 ESSENTIAL HYPERTENSION, BENIGN: ICD-10-CM

## 2020-06-30 DIAGNOSIS — K21.9 GASTROESOPHAGEAL REFLUX DISEASE, ESOPHAGITIS PRESENCE NOT SPECIFIED: ICD-10-CM

## 2020-06-30 DIAGNOSIS — L98.9 SKIN LESION OF FACE: ICD-10-CM

## 2020-06-30 DIAGNOSIS — I67.9 CEREBROVASCULAR DISEASE: ICD-10-CM

## 2020-06-30 PROCEDURE — 93000 ELECTROCARDIOGRAM COMPLETE: CPT | Performed by: PHYSICIAN ASSISTANT

## 2020-06-30 PROCEDURE — 99397 PER PM REEVAL EST PAT 65+ YR: CPT | Performed by: PHYSICIAN ASSISTANT

## 2020-06-30 NOTE — PROGRESS NOTES
Subjective   Dominik Varma is a 74 y.o. male  Annual Exam (Annual Physical Exam )      History of Present Illness  Patient presents today for a preventive medical visit.  Patient is here to determine screening labs and tests that are due and to determine immunization status as well.  Patient will be counseled regarding preventative medicine issues such as regular exercise and  healthy diet as well.  The following portions of the patient's history were reviewed and updated as appropriate: allergies, current medications, past social history and problem list    Review of Systems   Constitutional: Negative.  Negative for fever.   HENT: Negative.    Eyes: Negative.    Respiratory: Negative.    Cardiovascular: Negative.    Gastrointestinal: Negative.    Endocrine: Negative.    Genitourinary: Negative.    Musculoskeletal: Negative.  Negative for arthralgias.   Skin: Positive for color change. Negative for pallor, rash and wound.          Patient has noticed enlargement and increased irritation from a skin lesion on the right side of his forehead.  We have attempted to treat this with liquid nitrogen the past but is continued to increase in size.  He is interested in having dermatology remove this   Allergic/Immunologic: Negative.    Neurological: Negative.    Hematological: Negative.    Psychiatric/Behavioral: Negative.    All other systems reviewed and are negative.      Objective     Vitals:    06/30/20 0952   BP: 114/68   Pulse: 54   Temp: 97.7 °F (36.5 °C)   SpO2: 98%         Physical Exam   Constitutional: He is oriented to person, place, and time. He appears well-developed and well-nourished. No distress.   HENT:   Head: Normocephalic and atraumatic.   Right Ear: External ear normal.   Left Ear: External ear normal.   Nose: Nose normal.   Mouth/Throat: Oropharynx is clear and moist.   Eyes: Pupils are equal, round, and reactive to light. Conjunctivae and EOM are normal.   Neck: Normal range of motion. Neck supple.  No thyromegaly present.   Cardiovascular: Normal rate, regular rhythm, normal heart sounds and intact distal pulses.   No murmur heard.  Pulmonary/Chest: Effort normal and breath sounds normal.   Abdominal: Soft. Bowel sounds are normal. He exhibits no mass. There is no tenderness.   Musculoskeletal: Normal range of motion. He exhibits no edema.   Neurological: He is alert and oriented to person, place, and time. He has normal reflexes. No cranial nerve deficit. Coordination normal.   Skin: Skin is warm and dry. Rash noted. He is not diaphoretic. No erythema. No pallor.   Large, putty colored stuck on appearance to lesion right temporal region measuring approximately 2 x 2 cm   Psychiatric: He has a normal mood and affect. His behavior is normal. Judgment and thought content normal.   Nursing note and vitals reviewed.        ECG 12 Lead  Date/Time: 6/30/2020 1:02 PM  Performed by: Kassidy Bolton PA-C  Authorized by: Kassidy Bolton PA-C   Comparison: compared with previous ECG from 10/11/2018  Similar to previous ECG  Rhythm: sinus bradycardia  Rate: bradycardic  BPM: 54  Conduction: 1st degree AV block  QRS axis: normal    Clinical impression: abnormal EKG  Comments: No changes compared to prev ekg          Discussed preventative medicine issues with patient including regular exercise, healthy diet, stress reduction, adequate sleep and recommended age-appropriate screening studies.  Assessment/Plan     Dominik was seen today for annual exam.    Diagnoses and all orders for this visit:    General medical exam    Skin lesion of face  -     Ambulatory Referral to Dermatology    Cerebrovascular disease    Essential hypertension, benign    Mixed hyperlipidemia    Gastroesophageal reflux disease, esophagitis presence not specified    Other orders  -     ECG 12 Lead    Follow-up in 6 months for recheck and we will check labs at that time.  Labs are up-to-date today

## 2020-10-14 NOTE — PLAN OF CARE
Problem: Cardiac Catheterization with/without PCI (Adult)  Goal: Signs and Symptoms of Listed Potential Problems Will be Absent or Manageable (Cardiac Catheterization with/without PCI)  Outcome: Outcome(s) achieved Date Met:  10/17/17      
Problem: Patient Care Overview (Adult)  Goal: Plan of Care Review  Outcome: Ongoing (interventions implemented as appropriate)    10/17/17 1200   Coping/Psychosocial Response Interventions   Plan Of Care Reviewed With patient;spouse   Patient Care Overview   Progress no change   Outcome Evaluation   Outcome Summary/Follow up Plan PT TO HAVE LHC WITH DR. BLUNT TODAY.           
Problem: Patient Care Overview (Adult)  Goal: Plan of Care Review  Outcome: Outcome(s) achieved Date Met:  10/17/17  Pt. To be d/c'd home today.      
Adequate: hears normal conversation without difficulty

## 2021-02-01 ENCOUNTER — OFFICE VISIT (OUTPATIENT)
Dept: FAMILY MEDICINE CLINIC | Facility: CLINIC | Age: 76
End: 2021-02-01

## 2021-02-01 ENCOUNTER — LAB (OUTPATIENT)
Dept: LAB | Facility: HOSPITAL | Age: 76
End: 2021-02-01

## 2021-02-01 VITALS
TEMPERATURE: 97.3 F | HEIGHT: 72 IN | BODY MASS INDEX: 23.3 KG/M2 | SYSTOLIC BLOOD PRESSURE: 126 MMHG | WEIGHT: 172 LBS | HEART RATE: 73 BPM | OXYGEN SATURATION: 98 % | DIASTOLIC BLOOD PRESSURE: 78 MMHG

## 2021-02-01 DIAGNOSIS — E78.5 HYPERLIPIDEMIA, UNSPECIFIED HYPERLIPIDEMIA TYPE: ICD-10-CM

## 2021-02-01 DIAGNOSIS — I67.9 CEREBROVASCULAR DISEASE: ICD-10-CM

## 2021-02-01 DIAGNOSIS — I10 ESSENTIAL HYPERTENSION: ICD-10-CM

## 2021-02-01 DIAGNOSIS — Z23 IMMUNIZATION DUE: ICD-10-CM

## 2021-02-01 DIAGNOSIS — E78.5 HYPERLIPIDEMIA, UNSPECIFIED HYPERLIPIDEMIA TYPE: Primary | ICD-10-CM

## 2021-02-01 LAB
ANION GAP SERPL CALCULATED.3IONS-SCNC: 10.8 MMOL/L (ref 5–15)
BUN SERPL-MCNC: 14 MG/DL (ref 8–23)
BUN/CREAT SERPL: 15.4 (ref 7–25)
CALCIUM SPEC-SCNC: 9.2 MG/DL (ref 8.6–10.5)
CHLORIDE SERPL-SCNC: 105 MMOL/L (ref 98–107)
CHOLEST SERPL-MCNC: 92 MG/DL (ref 0–200)
CO2 SERPL-SCNC: 24.2 MMOL/L (ref 22–29)
CREAT SERPL-MCNC: 0.91 MG/DL (ref 0.76–1.27)
DEPRECATED RDW RBC AUTO: 42.5 FL (ref 37–54)
ERYTHROCYTE [DISTWIDTH] IN BLOOD BY AUTOMATED COUNT: 12.8 % (ref 12.3–15.4)
GFR SERPL CREATININE-BSD FRML MDRD: 81 ML/MIN/1.73
GLUCOSE SERPL-MCNC: 87 MG/DL (ref 65–99)
HCT VFR BLD AUTO: 39.4 % (ref 37.5–51)
HDLC SERPL-MCNC: 29 MG/DL (ref 40–60)
HGB BLD-MCNC: 13.4 G/DL (ref 13–17.7)
LDLC SERPL CALC-MCNC: 50 MG/DL (ref 0–100)
LDLC/HDLC SERPL: 1.8 {RATIO}
MCH RBC QN AUTO: 31.2 PG (ref 26.6–33)
MCHC RBC AUTO-ENTMCNC: 34 G/DL (ref 31.5–35.7)
MCV RBC AUTO: 91.6 FL (ref 79–97)
PLATELET # BLD AUTO: 145 10*3/MM3 (ref 140–450)
PMV BLD AUTO: 12.1 FL (ref 6–12)
POTASSIUM SERPL-SCNC: 4.3 MMOL/L (ref 3.5–5.2)
RBC # BLD AUTO: 4.3 10*6/MM3 (ref 4.14–5.8)
SODIUM SERPL-SCNC: 140 MMOL/L (ref 136–145)
TRIGL SERPL-MCNC: 54 MG/DL (ref 0–150)
VLDLC SERPL-MCNC: 13 MG/DL (ref 5–40)
WBC # BLD AUTO: 7.86 10*3/MM3 (ref 3.4–10.8)

## 2021-02-01 PROCEDURE — 99213 OFFICE O/P EST LOW 20 MIN: CPT | Performed by: PHYSICIAN ASSISTANT

## 2021-02-01 PROCEDURE — 36415 COLL VENOUS BLD VENIPUNCTURE: CPT

## 2021-02-01 PROCEDURE — 80061 LIPID PANEL: CPT

## 2021-02-01 PROCEDURE — 80048 BASIC METABOLIC PNL TOTAL CA: CPT

## 2021-02-01 PROCEDURE — 85027 COMPLETE CBC AUTOMATED: CPT

## 2021-02-01 RX ORDER — LISINOPRIL 20 MG/1
20 TABLET ORAL DAILY
Qty: 90 TABLET | Refills: 3 | Status: SHIPPED | OUTPATIENT
Start: 2021-02-01 | End: 2022-03-09 | Stop reason: SDUPTHER

## 2021-02-01 RX ORDER — CLOPIDOGREL BISULFATE 75 MG/1
75 TABLET ORAL DAILY
Qty: 90 TABLET | Refills: 3 | Status: SHIPPED | OUTPATIENT
Start: 2021-02-01 | End: 2022-03-09 | Stop reason: SDUPTHER

## 2021-02-01 RX ORDER — ATORVASTATIN CALCIUM 10 MG/1
10 TABLET, FILM COATED ORAL DAILY
Qty: 90 TABLET | Refills: 3 | Status: SHIPPED | OUTPATIENT
Start: 2021-02-01 | End: 2022-03-09 | Stop reason: SDUPTHER

## 2021-02-01 NOTE — PROGRESS NOTES
Subjective   Dominik Varma is a 75 y.o. male  Med Refill (Req refill on plavix, lisinopril and atorvastatin )      History of Present Illness  Patient is a pleasant 75-year-old white male who comes in today for follow-up on hyperlipidemia history of CVA and essential hypertension due for refills of medication due for labs he is also due for Pneumovax.  Denies any problems today.  The following portions of the patient's history were reviewed and updated as appropriate: allergies, current medications, past social history and problem list    Review of Systems   Constitutional: Negative.  Negative for fatigue and unexpected weight change.   Respiratory: Negative for cough, chest tightness and shortness of breath.    Cardiovascular: Negative for chest pain, palpitations and leg swelling.   Gastrointestinal: Negative.  Negative for nausea.   Genitourinary: Negative.    Skin: Negative for color change and rash.   Neurological: Negative for dizziness, syncope, weakness and headaches.       Objective     Vitals:    02/01/21 1027   BP: 126/78   Pulse: 73   Temp: 97.3 °F (36.3 °C)   SpO2: 98%       Physical Exam  Vitals signs and nursing note reviewed.   Constitutional:       General: He is not in acute distress.     Appearance: Normal appearance. He is well-developed. He is not ill-appearing, toxic-appearing or diaphoretic.   HENT:      Head: Normocephalic and atraumatic.   Eyes:      Conjunctiva/sclera: Conjunctivae normal.   Neck:      Vascular: No JVD.   Cardiovascular:      Rate and Rhythm: Normal rate and regular rhythm.      Heart sounds: Normal heart sounds. No murmur.   Pulmonary:      Effort: Pulmonary effort is normal. No respiratory distress.      Breath sounds: Normal breath sounds.   Chest:      Chest wall: No tenderness.   Abdominal:      General: Bowel sounds are normal. There is no distension.      Palpations: Abdomen is soft. There is no mass.      Tenderness: There is no abdominal tenderness. There is no  guarding or rebound.      Hernia: No hernia is present.   Musculoskeletal:         General: No swelling.   Skin:     General: Skin is warm and dry.      Coloration: Skin is not pale.      Findings: No erythema.   Neurological:      Mental Status: He is alert and oriented to person, place, and time.   Psychiatric:         Mood and Affect: Mood normal.         Behavior: Behavior normal.         Thought Content: Thought content normal.         Judgment: Judgment normal.         Assessment/Plan     Diagnoses and all orders for this visit:    1. Hyperlipidemia, unspecified hyperlipidemia type (Primary)  -     atorvastatin (LIPITOR) 10 MG tablet; Take 1 tablet by mouth Daily.  Dispense: 90 tablet; Refill: 3  -     Lipid Panel; Future    2. Cerebrovascular disease  -     clopidogrel (PLAVIX) 75 MG tablet; Take 1 tablet by mouth Daily.  Dispense: 90 tablet; Refill: 3  -     Basic metabolic panel; Future  -     Lipid Panel; Future  -     CBC (No Diff); Future    3. Essential hypertension  -     lisinopril (PRINIVIL,ZESTRIL) 20 MG tablet; Take 1 tablet by mouth Daily.  Dispense: 90 tablet; Refill: 3  -     Basic metabolic panel; Future  -     Lipid Panel; Future  -     CBC (No Diff); Future    4. Immunization due    Pneumovax given today follow-up in 6 months for recheck we will send letter on labs

## 2021-08-03 ENCOUNTER — OFFICE VISIT (OUTPATIENT)
Dept: FAMILY MEDICINE CLINIC | Facility: CLINIC | Age: 76
End: 2021-08-03

## 2021-08-03 VITALS
SYSTOLIC BLOOD PRESSURE: 122 MMHG | WEIGHT: 172 LBS | HEART RATE: 66 BPM | TEMPERATURE: 97 F | HEIGHT: 72 IN | OXYGEN SATURATION: 99 % | BODY MASS INDEX: 23.3 KG/M2 | DIASTOLIC BLOOD PRESSURE: 78 MMHG

## 2021-08-03 DIAGNOSIS — I10 ESSENTIAL HYPERTENSION: Primary | ICD-10-CM

## 2021-08-03 DIAGNOSIS — E78.5 HYPERLIPIDEMIA, UNSPECIFIED HYPERLIPIDEMIA TYPE: ICD-10-CM

## 2021-08-03 DIAGNOSIS — K21.9 GASTROESOPHAGEAL REFLUX DISEASE, UNSPECIFIED WHETHER ESOPHAGITIS PRESENT: ICD-10-CM

## 2021-08-03 PROCEDURE — 99213 OFFICE O/P EST LOW 20 MIN: CPT | Performed by: PHYSICIAN ASSISTANT

## 2021-08-03 RX ORDER — BUDESONIDE 3 MG/1
CAPSULE, COATED PELLETS ORAL
COMMUNITY
Start: 2021-07-05 | End: 2022-03-09

## 2021-08-03 NOTE — PROGRESS NOTES
Subjective   Dominik Varma is a 75 y.o. male  Hypertension (6 month follow up on blood pressure, doing well on current meds, no refills needed)      History of Present Illness  Patient is a very pleasant 75-year-old male comes in today for follow-up of hypertension hyperlipidemia and GERD all symptoms are currently well controlled on medication he denies any problems from his medications.  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  The following portions of the patient's history were reviewed and updated as appropriate: allergies, current medications, past social history and problem list    Review of Systems   Constitutional: Negative for activity change, fatigue and unexpected weight change.   Respiratory: Negative for cough, chest tightness and shortness of breath.    Cardiovascular: Negative for chest pain, palpitations and leg swelling.   Gastrointestinal: Negative for nausea.   Skin: Negative for color change and rash.   Neurological: Negative for dizziness, syncope, weakness and headaches.   Psychiatric/Behavioral: Negative.        Objective     Vitals:    08/03/21 1340   BP: 122/78   Pulse: 66   Temp: 97 °F (36.1 °C)   SpO2: 99%       Physical Exam  Vitals and nursing note reviewed.   Constitutional:       General: He is not in acute distress.     Appearance: Normal appearance. He is well-developed. He is not ill-appearing, toxic-appearing or diaphoretic.   HENT:      Head: Normocephalic and atraumatic.   Neck:      Vascular: No JVD.   Cardiovascular:      Rate and Rhythm: Normal rate and regular rhythm.      Heart sounds: Normal heart sounds. No murmur heard.     Pulmonary:      Effort: Pulmonary effort is normal. No respiratory distress.      Breath sounds: Normal breath sounds.   Chest:      Chest wall: No tenderness.   Abdominal:      General: There is no distension.      Palpations: Abdomen is soft.      Tenderness: There is no abdominal  tenderness.   Skin:     General: Skin is warm and dry.      Coloration: Skin is not pale.      Findings: No erythema.   Neurological:      Mental Status: He is alert and oriented to person, place, and time.   Psychiatric:         Mood and Affect: Mood normal.         Behavior: Behavior normal.         Assessment/Plan     Diagnoses and all orders for this visit:    1. Essential hypertension (Primary)    2. Hyperlipidemia, unspecified hyperlipidemia type    3. Gastroesophageal reflux disease, unspecified whether esophagitis present    Continue current medications follow-up in 6 months for recheck we will check labs at that time.

## 2021-12-21 NOTE — ADDENDUM NOTE
Addended by: AGUSTO KRISHNAN on: 2/1/2021 02:14 PM     Modules accepted: Orders     Procedure Date:  2021    Patient: Gail Durand  : 1954    Sedation: MAC    Outpatient History and Physical Review for Colonoscopy and EGD    Indications: Personal Hx of Colonic Polyps and Dyspepsia    Family History of Colon Cancer or Colon Polyps: No    Current Facility-Administered Medications   Medication Dose Route Frequency Provider Last Rate Last Admin   • lactated ringers infusion   Intravenous Continuous Clay Mckeon  mL/hr at 21 1412 New Bag at 21 1412   • sodium chloride (PF) 0.9 % injection 2 mL  2 mL Intracatheter 2 times per day Clay Mckeon MD       • sodium chloride 0.9 % flush bag 25 mL  25 mL Intravenous PRN Clay Mckeon MD           ALLERGIES:  Latex and Wound dressing adhesive            Past Medical History:   Diagnosis Date   • Allergy    • Bronchitis    • Gastroesophageal reflux disease    • Hyperlipidemia      Past Surgical History:   Procedure Laterality Date   • Acoustic neuroma cpg     • Breast lumpectomy     • Colonoscopy  2021   • Egd  2021   • Hysterectomy      Total hysterectomy   • Tumor excision           PHYSICAL EXAM:  HEENT: Within normal limits  LUNGS: Lungs clear to auscultation. No cold symptoms present.  HEART: S1,S2, regular rate and rhythm, no murmer.  NEUROLOGICAL: Within normal limits.  MENTAL STATUS: Alert, oriented x3, interactive.  SKIN: Warm, dry and intact, no lesions or rashes.   GENITOURINARY: N\A    The risks of the procedure including the possibility of bleeding, perforation (possibly resulting in laparotomy/colostomy) aspiration, and the risk of a polypectomy were discussed with the patient who accepts these risks.

## 2022-03-09 ENCOUNTER — OFFICE VISIT (OUTPATIENT)
Dept: FAMILY MEDICINE CLINIC | Facility: CLINIC | Age: 77
End: 2022-03-09

## 2022-03-09 ENCOUNTER — LAB (OUTPATIENT)
Dept: LAB | Facility: HOSPITAL | Age: 77
End: 2022-03-09

## 2022-03-09 VITALS
HEART RATE: 61 BPM | DIASTOLIC BLOOD PRESSURE: 68 MMHG | WEIGHT: 165 LBS | TEMPERATURE: 97 F | HEIGHT: 72 IN | BODY MASS INDEX: 22.35 KG/M2 | OXYGEN SATURATION: 97 % | SYSTOLIC BLOOD PRESSURE: 118 MMHG

## 2022-03-09 DIAGNOSIS — I67.9 CEREBROVASCULAR DISEASE: ICD-10-CM

## 2022-03-09 DIAGNOSIS — I10 ESSENTIAL HYPERTENSION: ICD-10-CM

## 2022-03-09 DIAGNOSIS — I10 PRIMARY HYPERTENSION: Primary | ICD-10-CM

## 2022-03-09 DIAGNOSIS — E78.5 HYPERLIPIDEMIA, UNSPECIFIED HYPERLIPIDEMIA TYPE: ICD-10-CM

## 2022-03-09 DIAGNOSIS — Z86.2 HISTORY OF ANEMIA: ICD-10-CM

## 2022-03-09 DIAGNOSIS — I10 PRIMARY HYPERTENSION: ICD-10-CM

## 2022-03-09 LAB
ANION GAP SERPL CALCULATED.3IONS-SCNC: 11.7 MMOL/L (ref 5–15)
BUN SERPL-MCNC: 12 MG/DL (ref 8–23)
BUN/CREAT SERPL: 13.8 (ref 7–25)
CALCIUM SPEC-SCNC: 10 MG/DL (ref 8.6–10.5)
CHLORIDE SERPL-SCNC: 103 MMOL/L (ref 98–107)
CHOLEST SERPL-MCNC: 107 MG/DL (ref 0–200)
CO2 SERPL-SCNC: 24.3 MMOL/L (ref 22–29)
CREAT SERPL-MCNC: 0.87 MG/DL (ref 0.76–1.27)
DEPRECATED RDW RBC AUTO: 43 FL (ref 37–54)
EGFRCR SERPLBLD CKD-EPI 2021: 90 ML/MIN/1.73
ERYTHROCYTE [DISTWIDTH] IN BLOOD BY AUTOMATED COUNT: 13 % (ref 12.3–15.4)
GLUCOSE SERPL-MCNC: 117 MG/DL (ref 65–99)
HCT VFR BLD AUTO: 41.6 % (ref 37.5–51)
HDLC SERPL-MCNC: 32 MG/DL (ref 40–60)
HGB BLD-MCNC: 13.6 G/DL (ref 13–17.7)
LDLC SERPL CALC-MCNC: 62 MG/DL (ref 0–100)
LDLC/HDLC SERPL: 1.99 {RATIO}
MCH RBC QN AUTO: 29.4 PG (ref 26.6–33)
MCHC RBC AUTO-ENTMCNC: 32.7 G/DL (ref 31.5–35.7)
MCV RBC AUTO: 89.8 FL (ref 79–97)
PLATELET # BLD AUTO: 257 10*3/MM3 (ref 140–450)
PMV BLD AUTO: 11.4 FL (ref 6–12)
POTASSIUM SERPL-SCNC: 4.4 MMOL/L (ref 3.5–5.2)
RBC # BLD AUTO: 4.63 10*6/MM3 (ref 4.14–5.8)
SODIUM SERPL-SCNC: 139 MMOL/L (ref 136–145)
TRIGL SERPL-MCNC: 57 MG/DL (ref 0–150)
VLDLC SERPL-MCNC: 13 MG/DL (ref 5–40)
WBC NRBC COR # BLD: 10.55 10*3/MM3 (ref 3.4–10.8)

## 2022-03-09 PROCEDURE — 36415 COLL VENOUS BLD VENIPUNCTURE: CPT

## 2022-03-09 PROCEDURE — 99213 OFFICE O/P EST LOW 20 MIN: CPT | Performed by: PHYSICIAN ASSISTANT

## 2022-03-09 PROCEDURE — 80061 LIPID PANEL: CPT

## 2022-03-09 PROCEDURE — 80048 BASIC METABOLIC PNL TOTAL CA: CPT

## 2022-03-09 PROCEDURE — 85027 COMPLETE CBC AUTOMATED: CPT

## 2022-03-09 RX ORDER — CLOPIDOGREL BISULFATE 75 MG/1
75 TABLET ORAL DAILY
Qty: 90 TABLET | Refills: 3 | Status: SHIPPED | OUTPATIENT
Start: 2022-03-09 | End: 2023-03-10

## 2022-03-09 RX ORDER — LISINOPRIL 20 MG/1
20 TABLET ORAL DAILY
Qty: 90 TABLET | Refills: 3 | Status: SHIPPED | OUTPATIENT
Start: 2022-03-09 | End: 2023-02-14

## 2022-03-09 RX ORDER — ATORVASTATIN CALCIUM 10 MG/1
10 TABLET, FILM COATED ORAL DAILY
Qty: 90 TABLET | Refills: 3 | Status: SHIPPED | OUTPATIENT
Start: 2022-03-09 | End: 2023-03-14

## 2022-03-09 NOTE — PROGRESS NOTES
"Subjective   Dominik Varma is a 76 y.o. male  Hyperlipidemia (Follow up on cholesterol, refill on atorvastatin ) and Hypertension (Follow up on BP, refill on Lisinopril)      History of Present Illness     The patient verbally consented to being recorded.     The patient presents today for a follow-up on hyperlipidemia and hypertension. The patient states in general he feel well. He states he does have some mild back pain that he does see a chiropractor for.   The patient states approximately 2 months ago he had a knot pop up down where he had his hernia. This was in the right lower quadrant of his abdomen. The patient states \"it burned and hurt so bad that I couldn't eat supper that night.\" He stayed he went to bed and got up 3 hours later and the knot was gone and has not returned. The patient denies any gastrointestinal issues.    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 He denies any palpitations or angina.    The patient has a skin lesion on his forehead that he has seen a dermatologist for that has returned. He states he applies Vaseline to the lesio      The following portions of the patient's history were reviewed and updated as appropriate: allergies, current medications, past social history and problem list    Review of Systems   Constitutional: Negative.    Respiratory: Negative.    Gastrointestinal: Negative.    Genitourinary: Negative.    Musculoskeletal: Positive for back pain ( chronic stable, goes to chiropractor). Negative for arthralgias, gait problem and myalgias.   Neurological: Negative for dizziness, tremors, weakness and numbness.       Objective     Vitals:    03/09/22 1401   BP: 118/68   Pulse: 61   Temp: 97 °F (36.1 °C)   SpO2: 97%       Physical Exam  Vitals and nursing note reviewed.   Constitutional:       General: He is not in acute distress.     Appearance: Normal appearance. He is well-developed. He is not ill-appearing, toxic-appearing or diaphoretic. "   Neck:      Vascular: No carotid bruit or JVD.   Cardiovascular:      Rate and Rhythm: Normal rate and regular rhythm.      Pulses: Normal pulses.      Heart sounds: Normal heart sounds. No murmur heard.  Pulmonary:      Effort: Pulmonary effort is normal. No respiratory distress.      Breath sounds: Normal breath sounds.   Abdominal:      Palpations: Abdomen is soft.      Tenderness: There is no abdominal tenderness.   Musculoskeletal:         General: No swelling or tenderness. Normal range of motion.   Skin:     General: Skin is warm and dry.      Comments: SK on forehead   Neurological:      Mental Status: He is alert and oriented to person, place, and time.   Psychiatric:         Mood and Affect: Mood normal.         Behavior: Behavior normal.         Assessment/Plan       Back pain  - I advised the patient to follow up with a chiropractor if his back pain worsens.    Hyperlipidemia  - Continue taking atorvastatin 10 mg, 1 tablet per day.  - Will get routine yearly labs today.    Hypertension  - Continue taking lisinopril 20 mg, 1 tablet per day.    Cerebrovascular disease  - Continue taking clopidogrel 75 mg, 1 tablet per day.    Transcribed from ambient dictation for Kassidy Bolton PA-C by THERESA SEYMOUR.  03/09/22   16:32 EST    Patient verbalized consent to the visit recording.  I have personally performed the services described in this document as transcribed by the above individual, and it is both accurate and complete.  Kassidy Bolton PA-C  3/10/2022  12:19 EST      Diagnoses and all orders for this visit:    1. Primary hypertension (Primary)  -     Lipid Panel; Future  -     Basic metabolic panel; Future    2. History of anemia  -     CBC (No Diff); Future    3. Hyperlipidemia, unspecified hyperlipidemia type  -     atorvastatin (LIPITOR) 10 MG tablet; Take 1 tablet by mouth Daily.  Dispense: 90 tablet; Refill: 3    4. Essential hypertension  -     lisinopril (PRINIVIL,ZESTRIL) 20 MG tablet;  Take 1 tablet by mouth Daily.  Dispense: 90 tablet; Refill: 3    5. Cerebrovascular disease  -     clopidogrel (PLAVIX) 75 MG tablet; Take 1 tablet by mouth Daily.  Dispense: 90 tablet; Refill: 3

## 2023-02-13 DIAGNOSIS — I10 ESSENTIAL HYPERTENSION: ICD-10-CM

## 2023-02-14 RX ORDER — LISINOPRIL 20 MG/1
20 TABLET ORAL DAILY
Qty: 90 TABLET | Refills: 0 | Status: SHIPPED | OUTPATIENT
Start: 2023-02-14 | End: 2023-03-20 | Stop reason: SDUPTHER

## 2023-03-05 DIAGNOSIS — I67.9 CEREBROVASCULAR DISEASE: ICD-10-CM

## 2023-03-10 RX ORDER — CLOPIDOGREL BISULFATE 75 MG/1
TABLET ORAL
Qty: 90 TABLET | Refills: 0 | Status: SHIPPED | OUTPATIENT
Start: 2023-03-10 | End: 2023-03-20 | Stop reason: SDUPTHER

## 2023-03-12 DIAGNOSIS — E78.5 HYPERLIPIDEMIA, UNSPECIFIED HYPERLIPIDEMIA TYPE: ICD-10-CM

## 2023-03-14 RX ORDER — ATORVASTATIN CALCIUM 10 MG/1
TABLET, FILM COATED ORAL
Qty: 30 TABLET | Refills: 0 | Status: SHIPPED | OUTPATIENT
Start: 2023-03-14 | End: 2023-03-20 | Stop reason: SDUPTHER

## 2023-03-20 ENCOUNTER — OFFICE VISIT (OUTPATIENT)
Dept: FAMILY MEDICINE CLINIC | Facility: CLINIC | Age: 78
End: 2023-03-20
Payer: COMMERCIAL

## 2023-03-20 ENCOUNTER — LAB (OUTPATIENT)
Dept: FAMILY MEDICINE CLINIC | Facility: CLINIC | Age: 78
End: 2023-03-20
Payer: COMMERCIAL

## 2023-03-20 VITALS
BODY MASS INDEX: 22.92 KG/M2 | DIASTOLIC BLOOD PRESSURE: 62 MMHG | WEIGHT: 169.2 LBS | OXYGEN SATURATION: 98 % | HEART RATE: 60 BPM | HEIGHT: 72 IN | TEMPERATURE: 97.8 F | SYSTOLIC BLOOD PRESSURE: 120 MMHG

## 2023-03-20 DIAGNOSIS — I10 ESSENTIAL HYPERTENSION: Primary | ICD-10-CM

## 2023-03-20 DIAGNOSIS — J01.90 ACUTE NON-RECURRENT SINUSITIS, UNSPECIFIED LOCATION: ICD-10-CM

## 2023-03-20 DIAGNOSIS — R73.09 ELEVATED GLUCOSE LEVEL: ICD-10-CM

## 2023-03-20 DIAGNOSIS — I67.9 CEREBROVASCULAR DISEASE: ICD-10-CM

## 2023-03-20 DIAGNOSIS — E78.5 HYPERLIPIDEMIA, UNSPECIFIED HYPERLIPIDEMIA TYPE: ICD-10-CM

## 2023-03-20 PROCEDURE — 83036 HEMOGLOBIN GLYCOSYLATED A1C: CPT | Performed by: PHYSICIAN ASSISTANT

## 2023-03-20 PROCEDURE — 80061 LIPID PANEL: CPT | Performed by: PHYSICIAN ASSISTANT

## 2023-03-20 PROCEDURE — 99214 OFFICE O/P EST MOD 30 MIN: CPT | Performed by: PHYSICIAN ASSISTANT

## 2023-03-20 PROCEDURE — 80048 BASIC METABOLIC PNL TOTAL CA: CPT | Performed by: PHYSICIAN ASSISTANT

## 2023-03-20 RX ORDER — AMOXICILLIN 500 MG/1
500 CAPSULE ORAL 3 TIMES DAILY
Qty: 21 CAPSULE | Refills: 0 | Status: SHIPPED | OUTPATIENT
Start: 2023-03-20

## 2023-03-20 RX ORDER — ATORVASTATIN CALCIUM 10 MG/1
10 TABLET, FILM COATED ORAL DAILY
Qty: 90 TABLET | Refills: 3 | Status: SHIPPED | OUTPATIENT
Start: 2023-03-20

## 2023-03-20 RX ORDER — CLOPIDOGREL BISULFATE 75 MG/1
75 TABLET ORAL DAILY
Qty: 90 TABLET | Refills: 3 | Status: SHIPPED | OUTPATIENT
Start: 2023-03-20

## 2023-03-20 RX ORDER — MONTELUKAST SODIUM 10 MG/1
10 TABLET ORAL NIGHTLY
Qty: 15 TABLET | Refills: 0 | Status: SHIPPED | OUTPATIENT
Start: 2023-03-20 | End: 2023-03-31

## 2023-03-20 RX ORDER — LISINOPRIL 20 MG/1
20 TABLET ORAL DAILY
Qty: 90 TABLET | Refills: 3 | Status: SHIPPED | OUTPATIENT
Start: 2023-03-20

## 2023-03-20 NOTE — PROGRESS NOTES
Subjective   Dominik Varma is a 77 y.o. male  Hyperlipidemia (Follow up on cholesterol, refill on atorvastatin ), Hypertension (Refill on lisinopril for BP), Cerebrovascular disease (Refill on plavix ), and Cough (Productive cough x3 weeks )      History of Present Illness  The patient is a 77-year-old coming in today for follow-up on hyperlipidemia, history of CVA, hypertension and to discuss a new problem of cough.    He has been coughing. He is not sure what it is. He denies sneezing. He blows his nose when he gets up in the morning about 10 times with clear watery mucus. When he coughs, if he brings anything up it is clear watery mucus. He feels like things are stuck in his upper airway. He denies pharyngitis. He notes this has been ongoing for 2 to 3 weeks. He denies angina, dizziness, feeling off balance, or the like. His bowels and kidneys are working fine. He denies cephalgia.    The following portions of the patient's history were reviewed and updated as appropriate: allergies, current medications, past social history and problem list    Review of Systems   Constitutional: Negative.  Negative for fatigue and unexpected weight change.   HENT: Positive for congestion and postnasal drip.    Respiratory: Positive for cough. Negative for chest tightness and shortness of breath.    Cardiovascular: Negative for chest pain, palpitations and leg swelling.   Gastrointestinal: Negative for nausea.   Skin: Negative for color change and rash.   Neurological: Negative for dizziness, syncope, weakness and headaches.       Objective     Vitals:    03/20/23 1323   BP: 120/62   Pulse: 60   Temp: 97.8 °F (36.6 °C)   SpO2: 98%       Physical Exam  Vitals and nursing note reviewed.   Constitutional:       General: He is not in acute distress.     Appearance: Normal appearance. He is well-developed. He is not ill-appearing, toxic-appearing or diaphoretic.   Neck:      Vascular: No carotid bruit or JVD.   Cardiovascular:       Rate and Rhythm: Normal rate and regular rhythm.      Pulses: Normal pulses.      Heart sounds: Normal heart sounds. No murmur heard.  Pulmonary:      Effort: Pulmonary effort is normal. No respiratory distress.      Breath sounds: Normal breath sounds.   Abdominal:      Palpations: Abdomen is soft.      Tenderness: There is no abdominal tenderness.   Skin:     General: Skin is warm and dry.   Neurological:      Mental Status: He is alert.         Assessment & Plan     Diagnoses and all orders for this visit:    1. Essential hypertension (Primary)  -     lisinopril (PRINIVIL,ZESTRIL) 20 MG tablet; Take 1 tablet by mouth Daily.  Dispense: 90 tablet; Refill: 3  -     Lipid Panel  -     Basic metabolic panel  -     Hemoglobin A1c    2. Hyperlipidemia, unspecified hyperlipidemia type  -     atorvastatin (LIPITOR) 10 MG tablet; Take 1 tablet by mouth Daily.  Dispense: 90 tablet; Refill: 3  -     Lipid Panel  -     Basic metabolic panel  -     Hemoglobin A1c    3. Cerebrovascular disease  -     clopidogrel (PLAVIX) 75 MG tablet; Take 1 tablet by mouth Daily.  Dispense: 90 tablet; Refill: 3    4. Elevated glucose level  -     Hemoglobin A1c    5. Acute non-recurrent sinusitis, unspecified location    Other orders  -     amoxicillin (AMOXIL) 500 MG capsule; Take 1 capsule by mouth 3 (Three) Times a Day. For sinus infection  Dispense: 21 capsule; Refill: 0  -     montelukast (Singulair) 10 MG tablet; Take 1 tablet by mouth Every Night. For cough and congestion  Dispense: 15 tablet; Refill: 0

## 2023-03-21 LAB
ANION GAP SERPL CALCULATED.3IONS-SCNC: 9 MMOL/L (ref 5–15)
BUN SERPL-MCNC: 13 MG/DL (ref 8–23)
BUN/CREAT SERPL: 15.1 (ref 7–25)
CALCIUM SPEC-SCNC: 9.5 MG/DL (ref 8.6–10.5)
CHLORIDE SERPL-SCNC: 107 MMOL/L (ref 98–107)
CHOLEST SERPL-MCNC: 91 MG/DL (ref 0–200)
CO2 SERPL-SCNC: 24 MMOL/L (ref 22–29)
CREAT SERPL-MCNC: 0.86 MG/DL (ref 0.76–1.27)
EGFRCR SERPLBLD CKD-EPI 2021: 89.7 ML/MIN/1.73
GLUCOSE SERPL-MCNC: 84 MG/DL (ref 65–99)
HBA1C MFR BLD: 5.4 % (ref 4.8–5.6)
HDLC SERPL-MCNC: 27 MG/DL (ref 40–60)
LDLC SERPL CALC-MCNC: 49 MG/DL (ref 0–100)
LDLC/HDLC SERPL: 1.88 {RATIO}
POTASSIUM SERPL-SCNC: 4.3 MMOL/L (ref 3.5–5.2)
SODIUM SERPL-SCNC: 140 MMOL/L (ref 136–145)
TRIGL SERPL-MCNC: 66 MG/DL (ref 0–150)
VLDLC SERPL-MCNC: 15 MG/DL (ref 5–40)

## 2023-03-31 RX ORDER — MONTELUKAST SODIUM 10 MG/1
TABLET ORAL
Qty: 30 TABLET | Refills: 11 | Status: SHIPPED | OUTPATIENT
Start: 2023-03-31

## 2023-09-20 ENCOUNTER — OFFICE VISIT (OUTPATIENT)
Dept: FAMILY MEDICINE CLINIC | Facility: CLINIC | Age: 78
End: 2023-09-20
Payer: COMMERCIAL

## 2023-09-20 VITALS
WEIGHT: 171 LBS | RESPIRATION RATE: 16 BRPM | DIASTOLIC BLOOD PRESSURE: 72 MMHG | SYSTOLIC BLOOD PRESSURE: 124 MMHG | BODY MASS INDEX: 23.16 KG/M2 | OXYGEN SATURATION: 98 % | HEART RATE: 68 BPM | HEIGHT: 72 IN

## 2023-09-20 DIAGNOSIS — E78.5 HYPERLIPIDEMIA, UNSPECIFIED HYPERLIPIDEMIA TYPE: ICD-10-CM

## 2023-09-20 DIAGNOSIS — I67.9 CEREBROVASCULAR DISEASE: ICD-10-CM

## 2023-09-20 DIAGNOSIS — I10 ESSENTIAL HYPERTENSION: ICD-10-CM

## 2023-09-20 DIAGNOSIS — Z00.00 GENERAL MEDICAL EXAM: Primary | ICD-10-CM

## 2023-09-20 PROBLEM — K52.839 MICROSCOPIC COLITIS: Status: ACTIVE | Noted: 2021-04-12

## 2023-09-20 PROCEDURE — 99397 PER PM REEVAL EST PAT 65+ YR: CPT | Performed by: PHYSICIAN ASSISTANT

## 2023-09-20 NOTE — PROGRESS NOTES
Subjective   Dominik Varma is a 78 y.o. male  Hyperlipidemia (6 Month follow up), Hypertension (6 Month follow up), and Annual Exam      Hyperlipidemia    Hypertension    + Patient presents today for a preventive medical visit.  Patient is here to determine screening labs and tests that are due and to determine immunization status as well.  Patient will be counseled regarding preventative medicine issues such as regular exercise and healthy diet as well.    The patient presents today for an annual physical and follow-up on hypertension and hyperlipidemia.    The patient reports he is doing well. He has not been in the hospital recently and has not had any antibiotics since the spring of 2022. He is still taking his blood thinners and over-the-counter heartburn medication. His allergies are doing well. He has not had his eyes checked recently. He is sleeping well at night. He wears glasses at night. His bowels are moving normally. He denies any issues with his kidneys or urination. He is no longer seeing cardiology. He denies any dizziness or lightheadedness.    The following portions of the patient's history were reviewed and updated as appropriate: allergies, current medications, past social history and problem list    Review of Systems   Constitutional: Negative.    HENT: Negative.     Eyes: Negative.    Respiratory: Negative.     Cardiovascular: Negative.    Gastrointestinal: Negative.    Endocrine: Negative.    Genitourinary: Negative.    Musculoskeletal: Negative.    Skin: Negative.    Allergic/Immunologic: Negative.    Neurological: Negative.    Hematological: Negative.    Psychiatric/Behavioral: Negative.     All other systems reviewed and are negative.    Objective     Vitals:    09/20/23 1005   BP: 124/72   Pulse: 68   Resp: 16   SpO2: 98%       Physical Exam  Vitals and nursing note reviewed.   Constitutional:       General: He is not in acute distress.     Appearance: Normal appearance. He is  well-developed. He is not ill-appearing, toxic-appearing or diaphoretic.   HENT:      Head: Normocephalic and atraumatic.      Right Ear: External ear normal.      Left Ear: External ear normal.   Eyes:      Conjunctiva/sclera: Conjunctivae normal.      Pupils: Pupils are equal, round, and reactive to light.   Neck:      Thyroid: No thyromegaly.      Vascular: No carotid bruit.   Cardiovascular:      Rate and Rhythm: Normal rate and regular rhythm.      Pulses: Normal pulses.      Heart sounds: Normal heart sounds. No murmur heard.  Pulmonary:      Effort: Pulmonary effort is normal. No respiratory distress.      Breath sounds: Normal breath sounds.   Abdominal:      General: Bowel sounds are normal.      Palpations: Abdomen is soft. There is no mass.      Tenderness: There is no abdominal tenderness.   Musculoskeletal:         General: No swelling. Normal range of motion.      Cervical back: Normal range of motion and neck supple.   Lymphadenopathy:      Cervical: No cervical adenopathy.   Skin:     General: Skin is warm and dry.      Findings: No lesion or rash.   Neurological:      Mental Status: He is alert and oriented to person, place, and time.      Cranial Nerves: No cranial nerve deficit.      Sensory: No sensory deficit.      Motor: No weakness.      Coordination: Coordination normal.      Gait: Gait normal.      Deep Tendon Reflexes: Reflexes are normal and symmetric.   Psychiatric:         Mood and Affect: Mood normal.         Behavior: Behavior normal.         Thought Content: Thought content normal.         Judgment: Judgment normal.     Discussed preventative medicine issues with patient including regular exercise, healthy diet, stress reduction, adequate sleep and recommended age-appropriate screening studies.  Assessment & Plan     Diagnoses and all orders for this visit:    1. General medical exam (Primary)    2. Essential hypertension    3. Hyperlipidemia, unspecified hyperlipidemia type    4.  Cerebrovascular disease     1. Annual physical  The patient is doing well overall. He will continue his current medication regimen. I will refill his medication for 6 months.      Transcribed from ambient dictation for Kassidy Bolton PA-C by Guille Muir.  09/20/23   11:37 EDT    Patient or patient representative verbalized consent to the visit recording.  I have personally performed the services described in this document as transcribed by the above individual, and it is both accurate and complete.

## 2023-09-20 NOTE — PROGRESS NOTES
Subjective   Dominik Varma is a 78 y.o. male  Hyperlipidemia (6 Month follow up), Hypertension (6 Month follow up), and Annual Exam      History of Present Illness  + Patient presents today for a preventive medical visit.  Patient is here to determine screening labs and tests that are due and to determine immunization status as well.  Patient will be counseled regarding preventative medicine issues such as regular exercise and healthy diet as well.  The following portions of the patient's history were reviewed and updated as appropriate: allergies, current medications, past social history and problem list    Review of Systems   Constitutional: Negative.    HENT: Negative.     Eyes: Negative.    Respiratory: Negative.     Cardiovascular: Negative.    Gastrointestinal: Negative.    Endocrine: Negative.    Genitourinary: Negative.    Musculoskeletal: Negative.    Skin: Negative.    Allergic/Immunologic: Negative.    Neurological: Negative.    Hematological: Negative.    Psychiatric/Behavioral: Negative.     All other systems reviewed and are negative.    Objective     Vitals:    09/20/23 1005   BP: 124/72   Pulse: 68   Resp: 16   SpO2: 98%       Physical Exam  Vitals and nursing note reviewed.   Constitutional:       General: He is not in acute distress.     Appearance: Normal appearance. He is well-developed. He is not ill-appearing, toxic-appearing or diaphoretic.   HENT:      Head: Normocephalic and atraumatic.      Right Ear: External ear normal.      Left Ear: External ear normal.   Eyes:      Conjunctiva/sclera: Conjunctivae normal.      Pupils: Pupils are equal, round, and reactive to light.   Neck:      Thyroid: No thyromegaly.      Vascular: No carotid bruit.   Cardiovascular:      Rate and Rhythm: Normal rate and regular rhythm.      Pulses: Normal pulses.      Heart sounds: Normal heart sounds. No murmur heard.  Pulmonary:      Effort: Pulmonary effort is normal. No respiratory distress.      Breath sounds:  Normal breath sounds.   Abdominal:      General: Bowel sounds are normal.      Palpations: Abdomen is soft. There is no mass.      Tenderness: There is no abdominal tenderness.   Musculoskeletal:         General: No swelling. Normal range of motion.      Cervical back: Normal range of motion and neck supple.   Lymphadenopathy:      Cervical: No cervical adenopathy.   Skin:     General: Skin is warm and dry.      Findings: No lesion or rash.   Neurological:      Mental Status: He is alert and oriented to person, place, and time.      Cranial Nerves: No cranial nerve deficit.      Sensory: No sensory deficit.      Motor: No weakness.      Coordination: Coordination normal.      Gait: Gait normal.      Deep Tendon Reflexes: Reflexes are normal and symmetric.   Psychiatric:         Mood and Affect: Mood normal.         Behavior: Behavior normal.         Thought Content: Thought content normal.         Judgment: Judgment normal.     Discussed preventative medicine issues with patient including regular exercise, healthy diet, stress reduction, adequate sleep and recommended age-appropriate screening studies.  Assessment & Plan     Diagnoses and all orders for this visit:    1. General medical exam (Primary)    2. Essential hypertension    3. Hyperlipidemia, unspecified hyperlipidemia type    4. Cerebrovascular disease

## 2024-04-07 DIAGNOSIS — E78.5 HYPERLIPIDEMIA, UNSPECIFIED HYPERLIPIDEMIA TYPE: ICD-10-CM

## 2024-04-08 RX ORDER — ATORVASTATIN CALCIUM 10 MG/1
10 TABLET, FILM COATED ORAL DAILY
Qty: 90 TABLET | Refills: 3 | Status: SHIPPED | OUTPATIENT
Start: 2024-04-08 | End: 2024-04-10 | Stop reason: SDUPTHER

## 2024-04-10 ENCOUNTER — OFFICE VISIT (OUTPATIENT)
Dept: FAMILY MEDICINE CLINIC | Facility: CLINIC | Age: 79
End: 2024-04-10
Payer: COMMERCIAL

## 2024-04-10 ENCOUNTER — LAB (OUTPATIENT)
Dept: FAMILY MEDICINE CLINIC | Facility: CLINIC | Age: 79
End: 2024-04-10
Payer: COMMERCIAL

## 2024-04-10 VITALS
HEIGHT: 72 IN | BODY MASS INDEX: 24.39 KG/M2 | TEMPERATURE: 98.1 F | WEIGHT: 180.1 LBS | HEART RATE: 72 BPM | OXYGEN SATURATION: 94 % | SYSTOLIC BLOOD PRESSURE: 134 MMHG | DIASTOLIC BLOOD PRESSURE: 80 MMHG

## 2024-04-10 DIAGNOSIS — Z12.5 PROSTATE CANCER SCREENING: ICD-10-CM

## 2024-04-10 DIAGNOSIS — J30.89 NON-SEASONAL ALLERGIC RHINITIS, UNSPECIFIED TRIGGER: ICD-10-CM

## 2024-04-10 DIAGNOSIS — K21.9 GASTROESOPHAGEAL REFLUX DISEASE, UNSPECIFIED WHETHER ESOPHAGITIS PRESENT: ICD-10-CM

## 2024-04-10 DIAGNOSIS — I10 ESSENTIAL HYPERTENSION: ICD-10-CM

## 2024-04-10 DIAGNOSIS — G89.29 CHRONIC HIP PAIN, RIGHT: ICD-10-CM

## 2024-04-10 DIAGNOSIS — I10 ESSENTIAL HYPERTENSION, BENIGN: ICD-10-CM

## 2024-04-10 DIAGNOSIS — E78.5 HYPERLIPIDEMIA, UNSPECIFIED HYPERLIPIDEMIA TYPE: Primary | ICD-10-CM

## 2024-04-10 DIAGNOSIS — I67.9 CEREBROVASCULAR DISEASE: ICD-10-CM

## 2024-04-10 DIAGNOSIS — M25.551 CHRONIC HIP PAIN, RIGHT: ICD-10-CM

## 2024-04-10 PROCEDURE — 80061 LIPID PANEL: CPT | Performed by: PHYSICIAN ASSISTANT

## 2024-04-10 PROCEDURE — G0103 PSA SCREENING: HCPCS | Performed by: PHYSICIAN ASSISTANT

## 2024-04-10 PROCEDURE — 85027 COMPLETE CBC AUTOMATED: CPT | Performed by: PHYSICIAN ASSISTANT

## 2024-04-10 PROCEDURE — 80048 BASIC METABOLIC PNL TOTAL CA: CPT | Performed by: PHYSICIAN ASSISTANT

## 2024-04-10 PROCEDURE — 99213 OFFICE O/P EST LOW 20 MIN: CPT | Performed by: PHYSICIAN ASSISTANT

## 2024-04-10 PROCEDURE — 36415 COLL VENOUS BLD VENIPUNCTURE: CPT | Performed by: PHYSICIAN ASSISTANT

## 2024-04-10 RX ORDER — LISINOPRIL 20 MG/1
20 TABLET ORAL DAILY
Qty: 90 TABLET | Refills: 3 | Status: SHIPPED | OUTPATIENT
Start: 2024-04-10

## 2024-04-10 RX ORDER — CLOPIDOGREL BISULFATE 75 MG/1
75 TABLET ORAL DAILY
Qty: 90 TABLET | Refills: 3 | Status: SHIPPED | OUTPATIENT
Start: 2024-04-10

## 2024-04-10 RX ORDER — ATORVASTATIN CALCIUM 10 MG/1
10 TABLET, FILM COATED ORAL DAILY
Qty: 90 TABLET | Refills: 3 | Status: SHIPPED | OUTPATIENT
Start: 2024-04-10

## 2024-04-10 RX ORDER — MONTELUKAST SODIUM 10 MG/1
10 TABLET ORAL NIGHTLY
Qty: 90 TABLET | Refills: 3 | Status: SHIPPED | OUTPATIENT
Start: 2024-04-10

## 2024-04-10 NOTE — PROGRESS NOTES
Subjective   Dominik Varma is a 78 y.o. male  Hypertension (Refill on lisiopril), Hyperlipidemia (Refill on atorvastatin ), and Allergies (Refill on singulair )      History of Present Illness  The patient is a 78-year-old male who is coming in today to follow up on hyperlipidemia, high blood pressure, and allergies.    The patient, a , reports persistent discomfort in his right hip, which has not worsened. He experiences difficulty rising after prolonged periods of sitting, necessitating support upon exiting a vehicle. He denies any history of falls.    He acknowledges a recent weight gain, which he attributes to recent cessation of smoking. He successfully quit smoking approximately 8 months ago, having no prior respiratory issues. He recalls using nicotine patches during a [place], which he found beneficial.     The following portions of the patient's history were reviewed and updated as appropriate: allergies, current medications, past social history and problem list    Review of Systems   Constitutional:  Negative for activity change, fatigue and unexpected weight change.   Respiratory:  Negative for cough, chest tightness and shortness of breath.    Cardiovascular:  Negative for chest pain, palpitations and leg swelling.   Gastrointestinal:  Negative for nausea.   Musculoskeletal:  Positive for arthralgias and myalgias. Negative for gait problem and joint swelling.   Skin: Negative.  Negative for color change and rash.   Neurological:  Negative for dizziness, syncope, weakness, numbness and headaches.       Objective     Vitals:    04/10/24 0930   BP: 134/80   Pulse: 72   Temp: 98.1 °F (36.7 °C)   SpO2: 94%       Physical Exam  Vitals and nursing note reviewed.   Constitutional:       General: He is not in acute distress.     Appearance: Normal appearance. He is well-developed. He is not ill-appearing, toxic-appearing or diaphoretic.   Cardiovascular:      Rate and Rhythm:  Normal rate and regular rhythm.      Pulses: Normal pulses.   Pulmonary:      Effort: Pulmonary effort is normal.      Breath sounds: Normal breath sounds.   Musculoskeletal:         General: Tenderness (right hip) present. No swelling, deformity or signs of injury.   Skin:     General: Skin is warm and dry.      Coloration: Skin is not pale.      Findings: No erythema or rash.   Neurological:      Mental Status: He is alert.      Motor: No abnormal muscle tone.      Coordination: Coordination normal.      Gait: Gait abnormal (slightly antalgic due to right hip).   Psychiatric:         Mood and Affect: Mood normal.         Behavior: Behavior normal.         Thought Content: Thought content normal.         Judgment: Judgment normal.         Assessment & Plan     Diagnoses and all orders for this visit:    1. Hyperlipidemia, unspecified hyperlipidemia type (Primary)  -     atorvastatin (LIPITOR) 10 MG tablet; Take 1 tablet by mouth Daily.  Dispense: 90 tablet; Refill: 3  -     Lipid Panel  -     Cancel: Basic metabolic panel  -     CBC (No Diff)  -     Basic metabolic panel    2. Essential hypertension, benign  -     Lipid Panel  -     Cancel: Basic metabolic panel  -     CBC (No Diff)  -     Basic metabolic panel    3. Gastroesophageal reflux disease, unspecified whether esophagitis present  -     CBC (No Diff)    4. Non-seasonal allergic rhinitis, unspecified trigger    5. Essential hypertension  -     lisinopril (PRINIVIL,ZESTRIL) 20 MG tablet; Take 1 tablet by mouth Daily.  Dispense: 90 tablet; Refill: 3  -     Lipid Panel  -     CBC (No Diff)    6. Cerebrovascular disease  -     clopidogrel (PLAVIX) 75 MG tablet; Take 1 tablet by mouth Daily.  Dispense: 90 tablet; Refill: 3  -     Lipid Panel  -     CBC (No Diff)    7. Prostate cancer screening  -     PSA Screen    8. Chronic hip pain, right    Other orders  -     esomeprazole (nexIUM) 20 MG capsule; Take 1 capsule by mouth Every Morning Before Breakfast. OTC   Dispense: 90 capsule; Refill: 3  -     montelukast (SINGULAIR) 10 MG tablet; Take 1 tablet by mouth Every Night.  Dispense: 90 tablet; Refill: 3    1. Arthritis of the right hip.  The patient exhibits significant arthritis in the right hip. Should the patient's condition deteriorate to the extent that it impedes his mobility, a referral to orthopedics will be given.    2. Health maintenance.  The patient's allergies are well-managed, and his blood pressure is within the normal range. He has experienced a slight weight gain, which he attributes to recent consumption of glazed donuts at [employer]. Laboratory tests will be conducted today.    Transcribed from ambient dictation for Kassidy Bolton PA-C by Mustapha Schroeder.   04/10/24   13:24 EDT    Patient or patient representative verbalized consent to the visit recording.  I have personally performed the services described in this document as transcribed by the above individual, and it is both accurate and complete.

## 2024-04-11 LAB
ANION GAP SERPL CALCULATED.3IONS-SCNC: 12 MMOL/L (ref 5–15)
BUN SERPL-MCNC: 11 MG/DL (ref 8–23)
BUN/CREAT SERPL: 9.1 (ref 7–25)
CALCIUM SPEC-SCNC: 9.4 MG/DL (ref 8.6–10.5)
CHLORIDE SERPL-SCNC: 107 MMOL/L (ref 98–107)
CHOLEST SERPL-MCNC: 99 MG/DL (ref 0–200)
CO2 SERPL-SCNC: 22 MMOL/L (ref 22–29)
CREAT SERPL-MCNC: 1.21 MG/DL (ref 0.76–1.27)
DEPRECATED RDW RBC AUTO: 44.9 FL (ref 37–54)
EGFRCR SERPLBLD CKD-EPI 2021: 61.7 ML/MIN/1.73
ERYTHROCYTE [DISTWIDTH] IN BLOOD BY AUTOMATED COUNT: 13.9 % (ref 12.3–15.4)
GLUCOSE SERPL-MCNC: 103 MG/DL (ref 65–99)
HCT VFR BLD AUTO: 39.2 % (ref 37.5–51)
HDLC SERPL-MCNC: 31 MG/DL (ref 40–60)
HGB BLD-MCNC: 13 G/DL (ref 13–17.7)
LDLC SERPL CALC-MCNC: 55 MG/DL (ref 0–100)
LDLC/HDLC SERPL: 1.81 {RATIO}
MCH RBC QN AUTO: 29.4 PG (ref 26.6–33)
MCHC RBC AUTO-ENTMCNC: 33.2 G/DL (ref 31.5–35.7)
MCV RBC AUTO: 88.7 FL (ref 79–97)
PLATELET # BLD AUTO: 145 10*3/MM3 (ref 140–450)
PMV BLD AUTO: 11.5 FL (ref 6–12)
POTASSIUM SERPL-SCNC: 4.5 MMOL/L (ref 3.5–5.2)
PSA SERPL-MCNC: 0.58 NG/ML (ref 0–4)
RBC # BLD AUTO: 4.42 10*6/MM3 (ref 4.14–5.8)
SODIUM SERPL-SCNC: 141 MMOL/L (ref 136–145)
TRIGL SERPL-MCNC: 59 MG/DL (ref 0–150)
VLDLC SERPL-MCNC: 13 MG/DL (ref 5–40)
WBC NRBC COR # BLD AUTO: 7.31 10*3/MM3 (ref 3.4–10.8)

## 2024-05-15 ENCOUNTER — APPOINTMENT (OUTPATIENT)
Dept: CT IMAGING | Facility: HOSPITAL | Age: 79
DRG: 330 | End: 2024-05-15
Payer: COMMERCIAL

## 2024-05-15 ENCOUNTER — ANESTHESIA (OUTPATIENT)
Dept: PERIOP | Facility: HOSPITAL | Age: 79
End: 2024-05-15
Payer: COMMERCIAL

## 2024-05-15 ENCOUNTER — HOSPITAL ENCOUNTER (INPATIENT)
Facility: HOSPITAL | Age: 79
LOS: 4 days | Discharge: HOME HEALTH | DRG: 330 | End: 2024-05-19
Attending: EMERGENCY MEDICINE | Admitting: INTERNAL MEDICINE
Payer: COMMERCIAL

## 2024-05-15 ENCOUNTER — ANESTHESIA EVENT CONVERTED (OUTPATIENT)
Dept: ANESTHESIOLOGY | Facility: HOSPITAL | Age: 79
DRG: 330 | End: 2024-05-15
Payer: COMMERCIAL

## 2024-05-15 ENCOUNTER — ANESTHESIA EVENT (OUTPATIENT)
Dept: PERIOP | Facility: HOSPITAL | Age: 79
End: 2024-05-15
Payer: COMMERCIAL

## 2024-05-15 DIAGNOSIS — I71.43 INFRARENAL ABDOMINAL AORTIC ANEURYSM (AAA) WITHOUT RUPTURE: ICD-10-CM

## 2024-05-15 DIAGNOSIS — I10 ESSENTIAL HYPERTENSION, BENIGN: ICD-10-CM

## 2024-05-15 DIAGNOSIS — K40.30 INCARCERATED RIGHT INGUINAL HERNIA: ICD-10-CM

## 2024-05-15 DIAGNOSIS — K40.30 INCARCERATED INGUINAL HERNIA: Primary | ICD-10-CM

## 2024-05-15 LAB
ALBUMIN SERPL-MCNC: 3.9 G/DL (ref 3.5–5.2)
ALBUMIN/GLOB SERPL: 1.3 G/DL
ALP SERPL-CCNC: 85 U/L (ref 39–117)
ALT SERPL W P-5'-P-CCNC: 12 U/L (ref 1–41)
ANION GAP SERPL CALCULATED.3IONS-SCNC: 9 MMOL/L (ref 5–15)
AST SERPL-CCNC: 16 U/L (ref 1–40)
BASOPHILS # BLD AUTO: 0.03 10*3/MM3 (ref 0–0.2)
BASOPHILS NFR BLD AUTO: 0.3 % (ref 0–1.5)
BILIRUB SERPL-MCNC: 2.8 MG/DL (ref 0–1.2)
BILIRUB UR QL STRIP: NEGATIVE
BUN SERPL-MCNC: 18 MG/DL (ref 8–23)
BUN/CREAT SERPL: 17 (ref 7–25)
CALCIUM SPEC-SCNC: 9.6 MG/DL (ref 8.6–10.5)
CHLORIDE SERPL-SCNC: 104 MMOL/L (ref 98–107)
CLARITY UR: CLEAR
CO2 SERPL-SCNC: 25 MMOL/L (ref 22–29)
COLOR UR: YELLOW
CREAT SERPL-MCNC: 1.06 MG/DL (ref 0.76–1.27)
DEPRECATED RDW RBC AUTO: 55.8 FL (ref 37–54)
EGFRCR SERPLBLD CKD-EPI 2021: 72.3 ML/MIN/1.73
EOSINOPHIL # BLD AUTO: 0.25 10*3/MM3 (ref 0–0.4)
EOSINOPHIL NFR BLD AUTO: 2.5 % (ref 0.3–6.2)
ERYTHROCYTE [DISTWIDTH] IN BLOOD BY AUTOMATED COUNT: 16.4 % (ref 12.3–15.4)
GLOBULIN UR ELPH-MCNC: 3 GM/DL
GLUCOSE SERPL-MCNC: 107 MG/DL (ref 65–99)
GLUCOSE UR STRIP-MCNC: NEGATIVE MG/DL
HCT VFR BLD AUTO: 39.6 % (ref 37.5–51)
HGB BLD-MCNC: 12.4 G/DL (ref 13–17.7)
HGB UR QL STRIP.AUTO: NEGATIVE
IMM GRANULOCYTES # BLD AUTO: 0.05 10*3/MM3 (ref 0–0.05)
IMM GRANULOCYTES NFR BLD AUTO: 0.5 % (ref 0–0.5)
INR PPP: 1.2 (ref 0.89–1.12)
KETONES UR QL STRIP: NEGATIVE
LEUKOCYTE ESTERASE UR QL STRIP.AUTO: NEGATIVE
LYMPHOCYTES # BLD AUTO: 1.14 10*3/MM3 (ref 0.7–3.1)
LYMPHOCYTES NFR BLD AUTO: 11.2 % (ref 19.6–45.3)
MCH RBC QN AUTO: 29.2 PG (ref 26.6–33)
MCHC RBC AUTO-ENTMCNC: 31.3 G/DL (ref 31.5–35.7)
MCV RBC AUTO: 93.4 FL (ref 79–97)
MONOCYTES # BLD AUTO: 0.98 10*3/MM3 (ref 0.1–0.9)
MONOCYTES NFR BLD AUTO: 9.7 % (ref 5–12)
NEUTROPHILS NFR BLD AUTO: 7.69 10*3/MM3 (ref 1.7–7)
NEUTROPHILS NFR BLD AUTO: 75.8 % (ref 42.7–76)
NITRITE UR QL STRIP: NEGATIVE
NRBC BLD AUTO-RTO: 0 /100 WBC (ref 0–0.2)
PH UR STRIP.AUTO: 6.5 [PH] (ref 5–8)
PLATELET # BLD AUTO: 126 10*3/MM3 (ref 140–450)
PMV BLD AUTO: 11.2 FL (ref 6–12)
POTASSIUM SERPL-SCNC: 4.4 MMOL/L (ref 3.5–5.2)
PROT SERPL-MCNC: 6.9 G/DL (ref 6–8.5)
PROT UR QL STRIP: NEGATIVE
PROTHROMBIN TIME: 15.4 SECONDS (ref 12.2–14.5)
QT INTERVAL: 422 MS
QTC INTERVAL: 435 MS
RBC # BLD AUTO: 4.24 10*6/MM3 (ref 4.14–5.8)
SODIUM SERPL-SCNC: 138 MMOL/L (ref 136–145)
SP GR UR STRIP: 1.01 (ref 1–1.03)
UROBILINOGEN UR QL STRIP: NORMAL
WBC NRBC COR # BLD AUTO: 10.14 10*3/MM3 (ref 3.4–10.8)

## 2024-05-15 PROCEDURE — 25810000003 LACTATED RINGERS PER 1000 ML: Performed by: SURGERY

## 2024-05-15 PROCEDURE — 25010000002 FENTANYL CITRATE (PF) 100 MCG/2ML SOLUTION: Performed by: ANESTHESIOLOGY

## 2024-05-15 PROCEDURE — 99285 EMERGENCY DEPT VISIT HI MDM: CPT

## 2024-05-15 PROCEDURE — 93005 ELECTROCARDIOGRAM TRACING: CPT | Performed by: SURGERY

## 2024-05-15 PROCEDURE — 85610 PROTHROMBIN TIME: CPT | Performed by: PHYSICIAN ASSISTANT

## 2024-05-15 PROCEDURE — 25010000002 FENTANYL CITRATE (PF) 50 MCG/ML SOLUTION: Performed by: NURSE ANESTHETIST, CERTIFIED REGISTERED

## 2024-05-15 PROCEDURE — 25010000002 PROPOFOL 10 MG/ML EMULSION: Performed by: ANESTHESIOLOGY

## 2024-05-15 PROCEDURE — 85025 COMPLETE CBC W/AUTO DIFF WBC: CPT | Performed by: PHYSICIAN ASSISTANT

## 2024-05-15 PROCEDURE — C1781 MESH (IMPLANTABLE): HCPCS | Performed by: SURGERY

## 2024-05-15 PROCEDURE — 81003 URINALYSIS AUTO W/O SCOPE: CPT | Performed by: PHYSICIAN ASSISTANT

## 2024-05-15 PROCEDURE — 25010000002 SUGAMMADEX 200 MG/2ML SOLUTION: Performed by: ANESTHESIOLOGY

## 2024-05-15 PROCEDURE — 0DTJ4ZZ RESECTION OF APPENDIX, PERCUTANEOUS ENDOSCOPIC APPROACH: ICD-10-PCS | Performed by: SURGERY

## 2024-05-15 PROCEDURE — 74176 CT ABD & PELVIS W/O CONTRAST: CPT

## 2024-05-15 PROCEDURE — 0YU54JZ SUPPLEMENT RIGHT INGUINAL REGION WITH SYNTHETIC SUBSTITUTE, PERCUTANEOUS ENDOSCOPIC APPROACH: ICD-10-PCS | Performed by: SURGERY

## 2024-05-15 PROCEDURE — 88304 TISSUE EXAM BY PATHOLOGIST: CPT | Performed by: SURGERY

## 2024-05-15 PROCEDURE — 99223 1ST HOSP IP/OBS HIGH 75: CPT | Performed by: INTERNAL MEDICINE

## 2024-05-15 PROCEDURE — 0DBH4ZZ EXCISION OF CECUM, PERCUTANEOUS ENDOSCOPIC APPROACH: ICD-10-PCS | Performed by: SURGERY

## 2024-05-15 PROCEDURE — 25810000003 SODIUM CHLORIDE PER 500 ML: Performed by: SURGERY

## 2024-05-15 PROCEDURE — 25010000002 ONDANSETRON PER 1 MG: Performed by: ANESTHESIOLOGY

## 2024-05-15 PROCEDURE — 25010000002 DEXAMETHASONE PER 1 MG: Performed by: ANESTHESIOLOGY

## 2024-05-15 PROCEDURE — 80053 COMPREHEN METABOLIC PANEL: CPT | Performed by: PHYSICIAN ASSISTANT

## 2024-05-15 PROCEDURE — 25010000002 BUPIVACAINE (PF) 0.25 % SOLUTION: Performed by: ANESTHESIOLOGY

## 2024-05-15 PROCEDURE — 93010 ELECTROCARDIOGRAM REPORT: CPT | Performed by: INTERNAL MEDICINE

## 2024-05-15 PROCEDURE — 25010000002 PIPERACILLIN SOD-TAZOBACTAM PER 1 G: Performed by: SURGERY

## 2024-05-15 PROCEDURE — 25010000002 PIPERACILLIN SOD-TAZOBACTAM PER 1 G: Performed by: NURSE ANESTHETIST, CERTIFIED REGISTERED

## 2024-05-15 PROCEDURE — 25810000003 LACTATED RINGERS PER 1000 ML: Performed by: ANESTHESIOLOGY

## 2024-05-15 PROCEDURE — 25010000002 FENTANYL CITRATE (PF) 50 MCG/ML SOLUTION

## 2024-05-15 PROCEDURE — 8E0W4CZ ROBOTIC ASSISTED PROCEDURE OF TRUNK REGION, PERCUTANEOUS ENDOSCOPIC APPROACH: ICD-10-PCS | Performed by: SURGERY

## 2024-05-15 PROCEDURE — 25010000002 DEXAMETHASONE SODIUM PHOSPHATE 10 MG/ML SOLUTION: Performed by: ANESTHESIOLOGY

## 2024-05-15 DEVICE — PHASIX MESH, 6" X 8" (15.2 CM X 20.3 CM), RECTANGLE
Type: IMPLANTABLE DEVICE | Site: ABDOMEN | Status: FUNCTIONAL
Brand: PHASIX

## 2024-05-15 DEVICE — DEV WND/CLS STRATAFIX SPIRALPDS PLS CT 2/0 15CM 26MM VIL: Type: IMPLANTABLE DEVICE | Site: ABDOMEN | Status: FUNCTIONAL

## 2024-05-15 DEVICE — SUREFORM 45 RELOAD BLUE
Type: IMPLANTABLE DEVICE | Site: ABDOMEN | Status: FUNCTIONAL
Brand: SUREFORM

## 2024-05-15 DEVICE — SUREFORM 45 RELOAD WHITE
Type: IMPLANTABLE DEVICE | Site: ABDOMEN | Status: FUNCTIONAL
Brand: SUREFORM

## 2024-05-15 RX ORDER — SODIUM CHLORIDE 0.9 % (FLUSH) 0.9 %
10 SYRINGE (ML) INJECTION EVERY 12 HOURS SCHEDULED
Status: DISCONTINUED | OUTPATIENT
Start: 2024-05-15 | End: 2024-05-15 | Stop reason: HOSPADM

## 2024-05-15 RX ORDER — DEXAMETHASONE SODIUM PHOSPHATE 4 MG/ML
INJECTION, SOLUTION INTRA-ARTICULAR; INTRALESIONAL; INTRAMUSCULAR; INTRAVENOUS; SOFT TISSUE AS NEEDED
Status: DISCONTINUED | OUTPATIENT
Start: 2024-05-15 | End: 2024-05-15 | Stop reason: SURG

## 2024-05-15 RX ORDER — BUPIVACAINE HYDROCHLORIDE 2.5 MG/ML
INJECTION, SOLUTION EPIDURAL; INFILTRATION; INTRACAUDAL
Status: COMPLETED | OUTPATIENT
Start: 2024-05-15 | End: 2024-05-15

## 2024-05-15 RX ORDER — ONDANSETRON 2 MG/ML
4 INJECTION INTRAMUSCULAR; INTRAVENOUS EVERY 6 HOURS PRN
Status: DISCONTINUED | OUTPATIENT
Start: 2024-05-15 | End: 2024-05-19 | Stop reason: HOSPADM

## 2024-05-15 RX ORDER — MORPHINE SULFATE 2 MG/ML
2 INJECTION, SOLUTION INTRAMUSCULAR; INTRAVENOUS
Status: DISCONTINUED | OUTPATIENT
Start: 2024-05-15 | End: 2024-05-19 | Stop reason: HOSPADM

## 2024-05-15 RX ORDER — FAMOTIDINE 10 MG/ML
20 INJECTION, SOLUTION INTRAVENOUS
Status: COMPLETED | OUTPATIENT
Start: 2024-05-15 | End: 2024-05-15

## 2024-05-15 RX ORDER — NITROGLYCERIN 0.4 MG/1
0.4 TABLET SUBLINGUAL
Status: DISCONTINUED | OUTPATIENT
Start: 2024-05-15 | End: 2024-05-19 | Stop reason: HOSPADM

## 2024-05-15 RX ORDER — FENTANYL CITRATE 50 UG/ML
50 INJECTION, SOLUTION INTRAMUSCULAR; INTRAVENOUS
Status: DISCONTINUED | OUTPATIENT
Start: 2024-05-15 | End: 2024-05-15 | Stop reason: HOSPADM

## 2024-05-15 RX ORDER — SODIUM CHLORIDE, SODIUM LACTATE, POTASSIUM CHLORIDE, CALCIUM CHLORIDE 600; 310; 30; 20 MG/100ML; MG/100ML; MG/100ML; MG/100ML
9 INJECTION, SOLUTION INTRAVENOUS CONTINUOUS PRN
Status: DISCONTINUED | OUTPATIENT
Start: 2024-05-15 | End: 2024-05-19 | Stop reason: HOSPADM

## 2024-05-15 RX ORDER — FENTANYL CITRATE 50 UG/ML
INJECTION, SOLUTION INTRAMUSCULAR; INTRAVENOUS AS NEEDED
Status: DISCONTINUED | OUTPATIENT
Start: 2024-05-15 | End: 2024-05-15 | Stop reason: SURG

## 2024-05-15 RX ORDER — PROPOFOL 10 MG/ML
VIAL (ML) INTRAVENOUS AS NEEDED
Status: DISCONTINUED | OUTPATIENT
Start: 2024-05-15 | End: 2024-05-15 | Stop reason: SURG

## 2024-05-15 RX ORDER — PANTOPRAZOLE SODIUM 40 MG/1
40 TABLET, DELAYED RELEASE ORAL
Status: DISCONTINUED | OUTPATIENT
Start: 2024-05-16 | End: 2024-05-19 | Stop reason: HOSPADM

## 2024-05-15 RX ORDER — FENTANYL CITRATE 50 UG/ML
INJECTION, SOLUTION INTRAMUSCULAR; INTRAVENOUS
Status: COMPLETED
Start: 2024-05-15 | End: 2024-05-15

## 2024-05-15 RX ORDER — ROCURONIUM BROMIDE 10 MG/ML
INJECTION, SOLUTION INTRAVENOUS AS NEEDED
Status: DISCONTINUED | OUTPATIENT
Start: 2024-05-15 | End: 2024-05-15 | Stop reason: SURG

## 2024-05-15 RX ORDER — ONDANSETRON 4 MG/1
4 TABLET, ORALLY DISINTEGRATING ORAL EVERY 6 HOURS PRN
Status: DISCONTINUED | OUTPATIENT
Start: 2024-05-15 | End: 2024-05-19 | Stop reason: HOSPADM

## 2024-05-15 RX ORDER — FAMOTIDINE 20 MG/1
20 TABLET, FILM COATED ORAL
Status: COMPLETED | OUTPATIENT
Start: 2024-05-15 | End: 2024-05-15

## 2024-05-15 RX ORDER — NALOXONE HCL 0.4 MG/ML
0.4 VIAL (ML) INJECTION
Status: DISCONTINUED | OUTPATIENT
Start: 2024-05-15 | End: 2024-05-19 | Stop reason: HOSPADM

## 2024-05-15 RX ORDER — SODIUM CHLORIDE, SODIUM LACTATE, POTASSIUM CHLORIDE, CALCIUM CHLORIDE 600; 310; 30; 20 MG/100ML; MG/100ML; MG/100ML; MG/100ML
INJECTION, SOLUTION INTRAVENOUS CONTINUOUS PRN
Status: DISCONTINUED | OUTPATIENT
Start: 2024-05-15 | End: 2024-05-15 | Stop reason: SURG

## 2024-05-15 RX ORDER — HYDROMORPHONE HYDROCHLORIDE 1 MG/ML
0.5 INJECTION, SOLUTION INTRAMUSCULAR; INTRAVENOUS; SUBCUTANEOUS
Status: DISCONTINUED | OUTPATIENT
Start: 2024-05-15 | End: 2024-05-15 | Stop reason: HOSPADM

## 2024-05-15 RX ORDER — SODIUM CHLORIDE 0.9 % (FLUSH) 0.9 %
10 SYRINGE (ML) INJECTION AS NEEDED
Status: DISCONTINUED | OUTPATIENT
Start: 2024-05-15 | End: 2024-05-15 | Stop reason: HOSPADM

## 2024-05-15 RX ORDER — ONDANSETRON 2 MG/ML
INJECTION INTRAMUSCULAR; INTRAVENOUS AS NEEDED
Status: DISCONTINUED | OUTPATIENT
Start: 2024-05-15 | End: 2024-05-15 | Stop reason: SURG

## 2024-05-15 RX ORDER — DEXAMETHASONE SODIUM PHOSPHATE 10 MG/ML
INJECTION, SOLUTION INTRAMUSCULAR; INTRAVENOUS
Status: COMPLETED | OUTPATIENT
Start: 2024-05-15 | End: 2024-05-15

## 2024-05-15 RX ORDER — SODIUM CHLORIDE 9 MG/ML
INJECTION, SOLUTION INTRAVENOUS AS NEEDED
Status: DISCONTINUED | OUTPATIENT
Start: 2024-05-15 | End: 2024-05-15 | Stop reason: HOSPADM

## 2024-05-15 RX ORDER — OXYCODONE HYDROCHLORIDE AND ACETAMINOPHEN 5; 325 MG/1; MG/1
2 TABLET ORAL EVERY 4 HOURS PRN
Status: DISCONTINUED | OUTPATIENT
Start: 2024-05-15 | End: 2024-05-19 | Stop reason: HOSPADM

## 2024-05-15 RX ORDER — DROPERIDOL 2.5 MG/ML
0.62 INJECTION, SOLUTION INTRAMUSCULAR; INTRAVENOUS ONCE AS NEEDED
Status: DISCONTINUED | OUTPATIENT
Start: 2024-05-15 | End: 2024-05-15 | Stop reason: HOSPADM

## 2024-05-15 RX ORDER — SODIUM CHLORIDE, SODIUM LACTATE, POTASSIUM CHLORIDE, CALCIUM CHLORIDE 600; 310; 30; 20 MG/100ML; MG/100ML; MG/100ML; MG/100ML
50 INJECTION, SOLUTION INTRAVENOUS CONTINUOUS
Status: DISCONTINUED | OUTPATIENT
Start: 2024-05-15 | End: 2024-05-17

## 2024-05-15 RX ORDER — LIDOCAINE HYDROCHLORIDE 10 MG/ML
INJECTION, SOLUTION EPIDURAL; INFILTRATION; INTRACAUDAL; PERINEURAL AS NEEDED
Status: DISCONTINUED | OUTPATIENT
Start: 2024-05-15 | End: 2024-05-15 | Stop reason: SURG

## 2024-05-15 RX ORDER — BISACODYL 5 MG/1
10 TABLET, DELAYED RELEASE ORAL DAILY
Status: DISCONTINUED | OUTPATIENT
Start: 2024-05-15 | End: 2024-05-19 | Stop reason: HOSPADM

## 2024-05-15 RX ORDER — PHENYLEPHRINE HCL IN 0.9% NACL 1 MG/10 ML
SYRINGE (ML) INTRAVENOUS AS NEEDED
Status: DISCONTINUED | OUTPATIENT
Start: 2024-05-15 | End: 2024-05-15 | Stop reason: SURG

## 2024-05-15 RX ORDER — IPRATROPIUM BROMIDE AND ALBUTEROL SULFATE 2.5; .5 MG/3ML; MG/3ML
3 SOLUTION RESPIRATORY (INHALATION) ONCE AS NEEDED
Status: DISCONTINUED | OUTPATIENT
Start: 2024-05-15 | End: 2024-05-15 | Stop reason: HOSPADM

## 2024-05-15 RX ORDER — EPHEDRINE SULFATE 50 MG/ML
INJECTION INTRAVENOUS AS NEEDED
Status: DISCONTINUED | OUTPATIENT
Start: 2024-05-15 | End: 2024-05-15 | Stop reason: SURG

## 2024-05-15 RX ORDER — SODIUM CHLORIDE 9 MG/ML
40 INJECTION, SOLUTION INTRAVENOUS AS NEEDED
Status: DISCONTINUED | OUTPATIENT
Start: 2024-05-15 | End: 2024-05-15 | Stop reason: HOSPADM

## 2024-05-15 RX ORDER — SODIUM CHLORIDE 0.9 % (FLUSH) 0.9 %
10 SYRINGE (ML) INJECTION AS NEEDED
Status: DISCONTINUED | OUTPATIENT
Start: 2024-05-15 | End: 2024-05-19 | Stop reason: HOSPADM

## 2024-05-15 RX ORDER — DOCUSATE SODIUM 100 MG/1
100 CAPSULE, LIQUID FILLED ORAL 2 TIMES DAILY
Status: DISCONTINUED | OUTPATIENT
Start: 2024-05-15 | End: 2024-05-19 | Stop reason: HOSPADM

## 2024-05-15 RX ORDER — HEPARIN SODIUM 5000 [USP'U]/ML
5000 INJECTION, SOLUTION INTRAVENOUS; SUBCUTANEOUS EVERY 8 HOURS SCHEDULED
Status: DISCONTINUED | OUTPATIENT
Start: 2024-05-16 | End: 2024-05-19 | Stop reason: HOSPADM

## 2024-05-15 RX ADMIN — PIPERACILLIN AND TAZOBACTAM 3.38 G: 3; .375 INJECTION, POWDER, LYOPHILIZED, FOR SOLUTION INTRAVENOUS at 12:44

## 2024-05-15 RX ADMIN — ROCURONIUM BROMIDE 50 MG: 10 INJECTION INTRAVENOUS at 14:36

## 2024-05-15 RX ADMIN — SODIUM CHLORIDE, POTASSIUM CHLORIDE, SODIUM LACTATE AND CALCIUM CHLORIDE: 600; 310; 30; 20 INJECTION, SOLUTION INTRAVENOUS at 14:33

## 2024-05-15 RX ADMIN — Medication 100 MCG: at 14:41

## 2024-05-15 RX ADMIN — FENTANYL CITRATE 100 MCG: 50 INJECTION, SOLUTION INTRAMUSCULAR; INTRAVENOUS at 14:36

## 2024-05-15 RX ADMIN — FENTANYL CITRATE 50 MCG: 50 INJECTION, SOLUTION INTRAMUSCULAR; INTRAVENOUS at 18:07

## 2024-05-15 RX ADMIN — PIPERACILLIN AND TAZOBACTAM 3.38 G: 3; .375 INJECTION, POWDER, LYOPHILIZED, FOR SOLUTION INTRAVENOUS; PARENTERAL at 16:06

## 2024-05-15 RX ADMIN — ROCURONIUM BROMIDE 20 MG: 10 INJECTION INTRAVENOUS at 15:16

## 2024-05-15 RX ADMIN — DEXAMETHASONE SODIUM PHOSPHATE 4 MG: 4 INJECTION INTRA-ARTICULAR; INTRALESIONAL; INTRAMUSCULAR; INTRAVENOUS; SOFT TISSUE at 14:50

## 2024-05-15 RX ADMIN — OXYCODONE HYDROCHLORIDE AND ACETAMINOPHEN 2 TABLET: 5; 325 TABLET ORAL at 20:09

## 2024-05-15 RX ADMIN — Medication 100 MCG: at 14:51

## 2024-05-15 RX ADMIN — BISACODYL 10 MG: 5 TABLET, COATED ORAL at 20:09

## 2024-05-15 RX ADMIN — DEXAMETHASONE SODIUM PHOSPHATE 4 MG: 10 INJECTION, SOLUTION INTRAMUSCULAR; INTRAVENOUS at 14:42

## 2024-05-15 RX ADMIN — BUPIVACAINE HYDROCHLORIDE 60 ML: 2.5 INJECTION, SOLUTION EPIDURAL; INFILTRATION; INTRACAUDAL; PERINEURAL at 14:42

## 2024-05-15 RX ADMIN — EPHEDRINE SULFATE 10 MG: 50 INJECTION INTRAVENOUS at 14:48

## 2024-05-15 RX ADMIN — SODIUM CHLORIDE, POTASSIUM CHLORIDE, SODIUM LACTATE AND CALCIUM CHLORIDE 125 ML/HR: 600; 310; 30; 20 INJECTION, SOLUTION INTRAVENOUS at 20:10

## 2024-05-15 RX ADMIN — PROPOFOL 200 MG: 10 INJECTION, EMULSION INTRAVENOUS at 14:36

## 2024-05-15 RX ADMIN — SUGAMMADEX 200 MG: 100 INJECTION, SOLUTION INTRAVENOUS at 17:06

## 2024-05-15 RX ADMIN — EPHEDRINE SULFATE 5 MG: 50 INJECTION INTRAVENOUS at 17:16

## 2024-05-15 RX ADMIN — ONDANSETRON 4 MG: 2 INJECTION INTRAMUSCULAR; INTRAVENOUS at 17:06

## 2024-05-15 RX ADMIN — LIDOCAINE HYDROCHLORIDE 50 MG: 10 INJECTION, SOLUTION EPIDURAL; INFILTRATION; INTRACAUDAL; PERINEURAL at 14:36

## 2024-05-15 RX ADMIN — DOCUSATE SODIUM 100 MG: 100 CAPSULE, LIQUID FILLED ORAL at 20:09

## 2024-05-15 RX ADMIN — FAMOTIDINE 20 MG: 10 INJECTION, SOLUTION INTRAVENOUS at 13:42

## 2024-05-15 RX ADMIN — EPHEDRINE SULFATE 10 MG: 50 INJECTION INTRAVENOUS at 14:51

## 2024-05-15 NOTE — CONSULTS
General Surgery Consultation Note    Date of Service: 5/15/2024  Dominik Varma  [id number]  1945      Referring Provider: Reed Raymond MD    Location of Consult: Emergency department     Reason for Consultation: Right inguinal hernia incarcerated       History of Present Illness:  I am seeing, Dominik Varma, in consultation at request of Reed Raymond MD regarding recurrent right inguinal hernia with incarceration.  He is a 78-year-old gentleman with history of CVA on Plavix hypertension, and hyperlipidemia who presents to [facility] with complaints of right groin pain.  He reports that starting Monday night 36 hours ago he developed pain in the right groin.  He reports that the pain has been constant since that time although it has waxed and waned somewhat.  He had subjective chills and sweats yesterday.  He is continue to have bowel function with last bowel movement yesterday.  No nausea or vomiting.  No documented fevers.  He takes Plavix and his last dose he took this morning.  He reports a history of prior right open inguinal hernia repair with mesh greater than 10 years ago.  He is not sure when he developed a recurrent bulge but did not have pain from this until recently..      Problems Addressed this Visit    None  Diagnoses    None.         PMHx:  Past Medical History:   Diagnosis Date    Cerumen impaction     Hyperlipidemia     Hypertension     Impaired fasting glucose     Inguinal hernia, right     Occlusion and stenosis of unspecified carotid artery     with infarct       Past Surgical History:  Past Surgical History:    CARDIAC CATHETERIZATION    Procedure: Left Heart Cath;  Surgeon: Zach Phoenix MD;  Location: [facility] INVASIVE LOCATION;  Service:     CEREBRAL ANGIOGRAM    HERNIA REPAIR       Allergies:  No Known Allergies    Medications:  No current facility-administered medications on file prior to encounter.     Current Outpatient Medications on File Prior to  Encounter   Medication Sig Dispense Refill    aspirin 81 MG EC tablet Take 1 tablet by mouth Every Night.      atorvastatin (LIPITOR) 10 MG tablet Take 1 tablet by mouth Daily. 90 tablet 3    clopidogrel (PLAVIX) 75 MG tablet Take 1 tablet by mouth Daily. 90 tablet 3    esomeprazole (nexIUM) 20 MG capsule Take 1 capsule by mouth Every Morning Before Breakfast. OTC 90 capsule 3    lisinopril (PRINIVIL,ZESTRIL) 20 MG tablet Take 1 tablet by mouth Daily. 90 tablet 3    montelukast (SINGULAIR) 10 MG tablet Take 1 tablet by mouth Every Night. 90 tablet 3         Current Facility-Administered Medications:     [COMPLETED] Insert Peripheral IV, , , Once **AND** sodium chloride 0.9 % flush 10 mL, 10 mL, Intravenous, PRN, Wiliam Flor, PA    Current Outpatient Medications:     aspirin 81 MG EC tablet, Take 1 tablet by mouth Every Night., Disp: , Rfl:     atorvastatin (LIPITOR) 10 MG tablet, Take 1 tablet by mouth Daily., Disp: 90 tablet, Rfl: 3    clopidogrel (PLAVIX) 75 MG tablet, Take 1 tablet by mouth Daily., Disp: 90 tablet, Rfl: 3    esomeprazole (nexIUM) 20 MG capsule, Take 1 capsule by mouth Every Morning Before Breakfast. OTC, Disp: 90 capsule, Rfl: 3    lisinopril (PRINIVIL,ZESTRIL) 20 MG tablet, Take 1 tablet by mouth Daily., Disp: 90 tablet, Rfl: 3    montelukast (SINGULAIR) 10 MG tablet, Take 1 tablet by mouth Every Night., Disp: 90 tablet, Rfl: 3      Family History:  Family History   Problem Relation Age of Onset    Cancer Mother     Hypertension Father     Heart disease Father        Social History: Pt lives in [place], [place].    Tobacco use: Past tobacco use, denies currently   EtOH use : Denies    Illicit drug use: Denies       Review of Systems:   Constitutional: No fevers, chills or malaise   Eyes: Denies visual changes    Cardiovascular: Denies chest pain, palpitations   Pulmonary: Denies cough or shortness of breath   Abdominal/ GI: See HPI    Genitourinary: Denies dysuria or  "hematuria   Musculoskeletal: Denies any but chronic joint aches, pains or deformities   Psychiatric: No recent mood changes   Neurologic: No paresthesias or loss of function    /86   Pulse 68   Temp 97.5 °F (36.4 °C) (Oral)   Resp 18   Ht 182.9 cm (72\")   Wt 77.1 kg (170 lb)   SpO2 96%   BMI 23.06 kg/m²   Body mass index is 23.06 kg/m².    Gen: Awake, alert, appears somewhat uncomfortable, sitting up in bed in no obvious distress  Head: Normocephalic, atraumatic.   Eyes: Pupils equal, round, react to light and accommodation.   Mouth: Oral mucosa without lesions,   Neck: No masses, lymphadenopathy or carotid bruits bilaterally   CV: Rhythm and rate regular, no murmurs, rubs or gallops  Lungs: Clear to auscultation bilaterally, not labored on room air   Abdomen: Soft, not distended, there is a bulge in the right groin with exquisite tenderness to palpation overlying the site and some small amount of erythema, this is not definitely reducible with manipulation.  There is no tenderness to palpation throughout the abdomen, there is a scar overlying the right groin consistent with prior hernia repair  Groin : No obvious hernias bilaterally   Extremities:  No cyanosis, clubbing or edema bilaterally  Lymphatics: No abnormal lymphadenopathy appreciated   Neurologic: No gross deficits         CBC  Results from last 7 days   Lab Units 05/15/24  1107   WBC 10*3/mm3 10.14   HEMOGLOBIN g/dL 12.4*   HEMATOCRIT % 39.6   PLATELETS 10*3/mm3 126*       CMP  Results from last 7 days   Lab Units 05/15/24  1107   SODIUM mmol/L 138   POTASSIUM mmol/L 4.4   CHLORIDE mmol/L 104   CO2 mmol/L 25.0   BUN mg/dL 18   CREATININE mg/dL 1.06   CALCIUM mg/dL 9.6   BILIRUBIN mg/dL 2.8*   ALK PHOS U/L 85   ALT (SGPT) U/L 12   AST (SGOT) U/L 16   GLUCOSE mg/dL 107*       Radiology  Imaging Results (Last 72 Hours)       Procedure Component Value Units Date/Time    CT Abdomen Pelvis Without Contrast [[id number]] Collected: 05/15/24 1044     " Updated: 05/15/24 1107    Narrative:      EXAM: CT ABDOMEN PELVIS WO CONTRAST-     DATE OF EXAM: 5/15/2024 10:23 AM     INDICATION: Right inguinal pain..     COMPARISON: None available     TECHNIQUE: Contiguous axial CT images were obtained from the lung bases  to the pubic symphysis without contrast. Sagittal and coronal  reconstructions were performed.  Automated exposure control and  iterative reconstruction methods were used.     FINDINGS:  The lack of intravenous contrast limits the evaluation of visceral and  vascular structures.     The heart size is normal. There is trace left-sided pleural effusion.  There is linear bandlike atelectasis within the left lower lobe.     There is cirrhotic liver morphology. There is no focal liver lesion  identified by noncontrast technique. The gallbladder is present without  wall thickening. There is no intrahepatic or extrahepatic biliary ductal  dilatation. The spleen measures 11.7 cm in craniocaudal dimension. The  adrenal glands appear within normal limits. There is fatty  interdigitation of the pancreas. There is no pancreatic ductal  dilatation.     The kidneys are symmetric in size. There is no hydronephrosis. There is  an exophytic 3.2 cm cyst arising from the lateral aspect of the left  kidney. There is no urolithiasis. The urinary bladder is fluid-filled  without wall thickening. The prostate is normal in size with central  calcification.     The stomach and duodenum are normal in caliber and configuration. There  are no abnormally dilated loops of small bowel to suggest small bowel  obstruction or small bowel inflammation. There is a right inguinal  hernia which contains the base of the cecum and the suspected appendix.  There is some mild inflammatory stranding surrounding the hernia sac as  well as fluid tracking into the right inguinal canal. There is a small  right hydrocele. The inflammation of the hernia sac is more likely  related to the hernia being  strangulated or incarcerated rather than a  primary appendicitis within the hernia. There is a moderate colonic  stool burden. There is no free air.     There is a large infrarenal saccular aneurysm arising from the left  anterior aspect of the aorta. There is no surrounding blood products to  suggest acute rupture. This measures up to 7.7 cm in maximal dimension.  There is no mesenteric, retroperitoneal, or pelvic lymphadenopathy by  size criteria. There is degenerative disc disease at L2-L3.       Impression:      1. Large infrarenal saccular type abdominal aortic aneurysm measuring up  to 7.7 cm. Vascular surgery consultation is recommended given the size.  No evidence of surrounding blood products to suggest acute rupture at  this time.  2. Right inguinal hernia containing portions of the base of the cecum  and appendix. There is inflammatory stranding within the hernia sac and  adjacent surrounding fluid. This is more concerning for a strangulated  or incarcerated right inguinal hernia rather than a primary appendicitis  within the hernia sac.  3. Cirrhotic liver morphology.     Findings called to Wiliam Flor at 10:55 a.m. on 5/15/2024                 This report was finalized on 5/15/2024 11:04 AM by David Velez MD.                   Results Review: I have personally reviewed all of the recent lab and imaging results available at this time.     Vital signs are stable    White blood cell count is 10.  Hemoglobin is 12.  Platelets are slightly low at 126.  INR is 1.2.  Labs demonstrate a slight elevation of his total bilirubin at 2.8.    I have personally reviewed a CT scan of the abdomen and pelvis.  This demonstrates a recurrent right inguinal hernia.  There is some stranding at this site.  It appears that the appendix is involved in his recurrent hernia and there is periappendiceal stranding and thickening.  There is a somewhat cirrhotic liver morphology but no ascites.  There is a abdominal aortic  aneurysm with no stranding or free fluid surrounding the site    Assessment:  Mr. Varma is a 78-year-old gentleman with history of CVA on Plavix, hypertension, hyperlipidemia, and compensated cirrhosis with an incarcerated right inguinal hernia containing an inflamed appendix.  I think this likely represents an incarcerated inguinal hernia that contains the appendix, but certainly the possibility of a primary appendicitis within his hernia is possible.  Because of this I have recommended to proceed to the operating room urgently today for appendectomy and recurrent right inguinal hernia repair, hopefully laparoscopically but possibly has an open procedure.  I had a long discussion with the patient and his family and explained the risk, benefits, and alternatives procedure.  Will plan to proceed forward    Plan:  -Keep n.p.o.  -IV antibiotics  -Plan to proceed to the operating room urgently for diagnostic laparoscopy, appendectomy, recurrent right inguinal hernia repair laparoscopic versus open      I discussed the patient's findings and my recommendations with the patient and/or family, as well as the primary team     Raul An MD  05/15/24  11:50 EDT      Part of this note may be an electronic transcription/translation of spoken language to printed text using the Dragon Dictation System.

## 2024-05-15 NOTE — ANESTHESIA PROCEDURE NOTES
"Peripheral Block      Patient reassessed immediately prior to procedure    Patient location during procedure: OR  Start time: 5/15/2024 2:42 PM  Reason for block: at surgeon's request and post-op pain management  Performed by  CRNA/CAA: Regulo Nguyen CRNASRNA: Cristopher Castro SRNA  Preanesthetic Checklist  Completed: patient identified, IV checked, site marked, risks and benefits discussed, surgical consent, monitors and equipment checked, pre-op evaluation and timeout performed  Prep:  Pt Position: supine  Sterile barriers:cap, gloves, mask and washed/disinfected hands  Prep: ChloraPrep  Patient monitoring: blood pressure monitoring, continuous pulse oximetry and EKG  Procedure    Sedation: yes  Performed under: general  Guidance:ultrasound guided  Images:still images obtained, printed/placed on chart    Laterality:Bilateral  Block Type:TAP  Injection Technique:single-shot  Needle Type:echogenic  Needle Gauge:20 G  Resistance on Injection: none    Medications Used: bupivacaine PF (MARCAINE) 0.25 % injection - Injection   60 mL - 5/15/2024 2:42:00 PM  dexamethasone sodium phosphate injection - Injection   4 mg - 5/15/2024 2:42:00 PM      Medications  Comment:Block Injection:  LA dose divided between Right and Left block        Post Assessment  Injection Assessment: negative aspiration for heme, incremental injection and no paresthesia on injection  Patient Tolerance:comfortable throughout block  Complications:no  Additional Notes    Mid-Axillary/Lateral TAPs    A high-frequency linear transducer, with sterile cover, was placed in the midaxillary line between the ASIS and costal margin. The External Oblique Muscle (EOM), Internal Oblique Muscle (IOM), Transverse Abdominus Muscle (CABRERA), and Peritoneum were identified. The insertion site was prepped in sterile fashion and then localized with 2-5 ml of 1% Lidocaine. Using ultrasound-guidance, a 20-gauge B-Munoz 4\" Ultraplex 360 non-stimulating echogenic " needle was advanced in plane, from medial to lateral, until the tip of the needle was in the fascial plane between the IOM and CABRERA. 1-3ml of preservative free normal saline was used to hydro-dissect the fascial planes. After the fascial plane was verified, the local anesthetic (LA) was injected. The procedure was repeated on the opposite side for bilateral coverage. Aspiration every 5 ml to prevent intravascular injection. Injection was completed with negative aspiration of blood and negative intravascular injection. Injection pressures were normal with minimal resistance. Midaxillary TAPs should reach intercostal nerves T10- T11 and the subcostal nerve T12.

## 2024-05-15 NOTE — ED PROVIDER NOTES
Subjective   History of Present Illness  Mr. Varma is a 78-year-old male with history of hypertension, hyperlipidemia, right inguinal hernia and previous CVA (2004 with no lasting deficits), who presents to the emergency department with a 2-day history of right inguinal pain and pain into the testicles.  The patient also has dysuria.  No gross hematuria.  No abdominal pain.  No nausea or vomiting.  Normal bowel movements.  No fever or chills.      Review of Systems   Constitutional:  Negative for chills and fever.   HENT:  Negative for sore throat.    Respiratory:  Negative for cough and shortness of breath.    Cardiovascular:  Negative for chest pain.   Gastrointestinal:  Negative for abdominal pain, blood in stool, constipation, diarrhea, nausea and vomiting.   Genitourinary:  Positive for dysuria, flank pain (Right-sided) and testicular pain. Negative for hematuria.   Musculoskeletal:  Positive for back pain (Right lower).   Skin:  Negative for pallor.   Neurological:  Negative for headaches.   Hematological: Negative.    Psychiatric/Behavioral: Negative.         Past Medical History:   Diagnosis Date    Cerumen impaction     Hyperlipidemia     Hypertension     Impaired fasting glucose     Inguinal hernia, right     Occlusion and stenosis of unspecified carotid artery     with infarct       No Known Allergies    Past Surgical History:   Procedure Laterality Date    CARDIAC CATHETERIZATION N/A 10/17/2017    Procedure: Left Heart Cath;  Surgeon: Zach Phoenix MD;  Location: [facility] CATH INVASIVE LOCATION;  Service:     CEREBRAL ANGIOGRAM      HERNIA REPAIR         Family History   Problem Relation Age of Onset    Cancer Mother     Hypertension Father     Heart disease Father        Social History     Socioeconomic History    Marital status:    Tobacco Use    Smoking status: Former     Current packs/day: 0.00     Average packs/day: 1 pack/day for 40.7 years (40.7 ttl pk-yrs)     Types: Cigarettes      Start date:      Quit date: 10/2017     Years since quittin.6     Passive exposure: Past    Smokeless tobacco: Never   Vaping Use    Vaping status: Never Used   Substance and Sexual Activity    Alcohol use: No    Drug use: No    Sexual activity: Defer           Objective   Physical Exam  Constitutional:       General: He is not in acute distress.     Appearance: Normal appearance. He is not diaphoretic.   HENT:      Head: Normocephalic.   Eyes:      Conjunctiva/sclera: Conjunctivae normal.      Pupils: Pupils are equal, round, and reactive to light.   Cardiovascular:      Rate and Rhythm: Normal rate and regular rhythm.      Pulses: Normal pulses.      Heart sounds: Normal heart sounds.   Pulmonary:      Effort: Pulmonary effort is normal.      Breath sounds: Normal breath sounds.   Abdominal:      Hernia: A hernia (Right inguinal fullness and tenderness concerning for inguinal hernia.) is present.   Genitourinary:     Testes: Normal.      Comments: Right inguinal hernia is present with tenderness on palpation.  No testicular tenderness.  Musculoskeletal:      Cervical back: Neck supple.   Skin:     General: Skin is warm and dry.   Neurological:      Mental Status: He is alert and oriented to person, place, and time.   Psychiatric:         Mood and Affect: Mood normal.         Procedures           ED Course      In summary, pleasant 78-year-old male with history of hypertension, hyperlipidemia, right inguinal hernia, prior CVA in , presents with 2-day history of right inguinal pain, dysuria and some right flank pain.  No fever or chills.  No nausea or vomiting.  Normal bowel movements.  No hematuria.                               MDM: Differential includes incarcerated inguinal hernia, UTI, kidney stone, etc.    UA collected.  Will send for CT abd/pelvis for further evaluation of right inguinal hernia.    CT abd/pelvis w/o:  1. Large infrarenal saccular type abdominal aortic aneurysm measuring up  to 7.7  cm. Vascular surgery consultation is recommended given the size.  No evidence of surrounding blood products to suggest acute rupture at  this time.  2. Right inguinal hernia containing portions of the base of the cecum  and appendix. There is inflammatory stranding within the hernia sac and  adjacent surrounding fluid. This is more concerning for a strangulated  or incarcerated right inguinal hernia rather than a primary appendicitis  within the hernia sac.  3. Cirrhotic liver morphology.    I spoke with the pt about his workup thus far.  I attempted to reduce his right inguinal hernia without success.  I will speak with general surgery about his hernia and will also page vascular surgery to discuss his large AAA.     Last po intake was small amount of coffee this morning.      White count is 10.14.  H&H is 12.4/39.6.  Glucose is 107.  Normal creatinine at 1.06.  Normal chemistries.  Normal LFTs.  Bilirubin is a bit up to 2.8.  INR is 1.2.    I spoke with  about his inguinal hernia with probable incarceration.  He advised that he would come see the patient.    Dr. An has been in to see pt and attempted to reduce the inguinal hernia without success.  He will plan to take to OR to reduce.    12:13 EDT  I have repaged Dr. Merida as well.    12:13 EDT  I spoke with Dr. Merida about the pt.  He advised that he would come see the pt and requested that I put in consult order for vascular surgery.                          Medical Decision Making  Problems Addressed:  Incarcerated inguinal hernia: complicated acute illness or injury  Infrarenal abdominal aortic aneurysm (AAA) without rupture: complicated acute illness or injury    Amount and/or Complexity of Data Reviewed  Labs: ordered.  Radiology: ordered.    Risk  Prescription drug management.        Final diagnoses:   Incarcerated inguinal hernia   Infrarenal abdominal aortic aneurysm (AAA) without rupture       ED Disposition  ED Disposition       ED  Disposition   Send to OR    Condition   --    Comment   --               No follow-up provider specified.       Medication List      No changes were made to your prescriptions during this visit.            Wiliam Flor, PA  05/15/24 1212       Wiliam Flor PA  05/15/24 1212

## 2024-05-15 NOTE — H&P
[facility] Medicine Services  HISTORY AND PHYSICAL    Patient Name: Dominik Varma  : 1945  MRN: [id number]  Primary Care Physician: Kassidy Bolton PA-C  Date of admission: 5/15/2024      Subjective   Subjective     Chief Complaint:  R groin pain     HPI:  Dominik Varma is a 78 y.o. male with history of CAD, HTN HL, CVA (no residual deficits) and a right inguinal hernia reparied about ten years ago.   He presented to the ED earlier today with acute R groin pain.  Imaging showed an incarcerated inguinal hernia, and an incidental AAA of 8cm.      He was taken to OR by Dr An who did a laparascopic repair and found the appendix incarcerated in the inguinal hernia, resulting in resection of cecum.  The patient is now in PACU and will be admitted to hospitalist service.  He denies nausea.  Pain is controlled.        Personal History     Past Medical History:   Diagnosis Date    Cerumen impaction     Hyperlipidemia     Hypertension     Impaired fasting glucose     Inguinal hernia, right     Occlusion and stenosis of unspecified carotid artery     with infarct           Past Surgical History:   Procedure Laterality Date    CARDIAC CATHETERIZATION N/A 10/17/2017    Procedure: Left Heart Cath;  Surgeon: Zach Phoenix MD;  Location: [facility] CATH INVASIVE LOCATION;  Service:     CEREBRAL ANGIOGRAM      HERNIA REPAIR         Family History: family history includes Cancer in his mother; Heart disease in his father; Hypertension in his father.     Social History:  reports that he quit smoking about 6 years ago. His smoking use included cigarettes. He started smoking about 47 years ago. He has a 40.7 pack-year smoking history. He has been exposed to tobacco smoke. He has never used smokeless tobacco. He reports that he does not drink alcohol and does not use drugs.  Social History     Social History Narrative    Not on file       Medications:  Available home medication information  reviewed.  aspirin, atorvastatin, clopidogrel, esomeprazole, lisinopril, and montelukast    No Known Allergies    Objective   Objective     Vital Signs:   Temp:  [97 °F (36.1 °C)-98.3 °F (36.8 °C)] 98 °F (36.7 °C)  Heart Rate:  [64-73] 64  Resp:  [16-18] 16  BP: (120-145)/(65-89) 139/74  Flow (L/min):  [2] 2       Physical Exam   Gen:  WD/WN man seen in PACU, in mild pain, alert and conversant   Neuro: alert and oriented, clear speech, follows commands, grossly nonfocal  HEENT:  NC/AT   Neck:  Supple, no LAD  Heart RRR no murmur, rub, or gallop  Lungs, anterior:  CTA, not labored.   Abd:  Soft, approp tender.  Laparoscopic incisions dressed.   Extrem:  No c/c/e  Skin warm and dry       Result Review:  I have personally reviewed the results from the time of this admission to 5/15/2024 17:48 EDT and agree with these findings:  [x]  Laboratory list / accordion  []  Microbiology  [x]  Radiology  []  EKG/Telemetry   []  Cardiology/Vascular   []  Pathology  []  Old records  []  Other:  Most notable findings include: hgb 12.4  Cr 1.0 .  AAA 8cm.   Plts 126       LAB RESULTS:      Lab 05/15/24  1131 05/15/24  1107   WBC  --  10.14   HEMOGLOBIN  --  12.4*   HEMATOCRIT  --  39.6   PLATELETS  --  126*   NEUTROS ABS  --  7.69*   IMMATURE GRANS (ABS)  --  0.05   LYMPHS ABS  --  1.14   MONOS ABS  --  0.98*   EOS ABS  --  0.25   MCV  --  93.4   PROTIME 15.4*  --    INR 1.20*  --          Lab 05/15/24  1107   SODIUM 138   POTASSIUM 4.4   CHLORIDE 104   CO2 25.0   ANION GAP 9.0   BUN 18   CREATININE 1.06   EGFR 72.3   GLUCOSE 107*   CALCIUM 9.6         Lab 05/15/24  1107   TOTAL PROTEIN 6.9   ALBUMIN 3.9   GLOBULIN 3.0   ALT (SGPT) 12   AST (SGOT) 16   BILIRUBIN 2.8*   ALK PHOS 85                     UA          5/15/2024    11:07   Urinalysis   Specific [place], UA 1.012    Ketones, UA Negative    Blood, UA Negative    Leukocytes, UA Negative    Nitrite, UA Negative        Microbiology Results (last 10 days)       ** No results  found for the last 240 hours. **            Peripheral Block    Result Date: 5/15/2024  Cristopher Castro, SRNA     5/15/2024  3:03 PM Peripheral Block Patient reassessed immediately prior to procedure Patient location during procedure: OR Start time: 5/15/2024 2:42 PM Reason for block: at surgeon's request and post-op pain management Performed by CRNA/CAA: Regulo Nguyen CRNASRNA: Cristopher Castro, TOMASA Preanesthetic Checklist Completed: patient identified, IV checked, site marked, risks and benefits discussed, surgical consent, monitors and equipment checked, pre-op evaluation and timeout performed Prep: Pt Position: supine Sterile barriers:cap, gloves, mask and washed/disinfected hands Prep: ChloraPrep Patient monitoring: blood pressure monitoring, continuous pulse oximetry and EKG Procedure Sedation: yes Performed under: general Guidance:ultrasound guided Images:still images obtained, printed/placed on chart Laterality:Bilateral Block Type:TAP Injection Technique:single-shot Needle Type:echogenic Needle Gauge:20 G Resistance on Injection: none Medications Used: bupivacaine PF (MARCAINE) 0.25 % injection - Injection  60 mL - 5/15/2024 2:42:00 PM dexamethasone sodium phosphate injection - Injection  4 mg - 5/15/2024 2:42:00 PM Medications Comment:Block Injection:  LA dose divided between Right and Left block Post Assessment Injection Assessment: negative aspiration for heme, incremental injection and no paresthesia on injection Patient Tolerance:comfortable throughout block Complications:no Additional Notes Mid-Axillary/Lateral TAPs A high-frequency linear transducer, with sterile cover, was placed in the midaxillary line between the ASIS and costal margin. The External Oblique Muscle (EOM), Internal Oblique Muscle (IOM), Transverse Abdominus Muscle (CABRERA), and Peritoneum were identified. The insertion site was prepped in sterile fashion and then localized with 2-5 ml of 1% Lidocaine. Using  "ultrasound-guidance, a 20-gauge B-Umnoz 4\" Ultraplex 360 non-stimulating echogenic needle was advanced in plane, from medial to lateral, until the tip of the needle was in the fascial plane between the IOM and CABRERA. 1-3ml of preservative free normal saline was used to hydro-dissect the fascial planes. After the fascial plane was verified, the local anesthetic (LA) was injected. The procedure was repeated on the opposite side for bilateral coverage. Aspiration every 5 ml to prevent intravascular injection. Injection was completed with negative aspiration of blood and negative intravascular injection. Injection pressures were normal with minimal resistance. Midaxillary TAPs should reach intercostal nerves T10- T11 and the subcostal nerve T12.      CT Abdomen Pelvis Without Contrast    Result Date: 5/15/2024  EXAM: CT ABDOMEN PELVIS WO CONTRAST-  DATE OF EXAM: 5/15/2024 10:23 AM  INDICATION: Right inguinal pain..  COMPARISON: None available  TECHNIQUE: Contiguous axial CT images were obtained from the lung bases to the pubic symphysis without contrast. Sagittal and coronal reconstructions were performed.  Automated exposure control and iterative reconstruction methods were used.  FINDINGS: The lack of intravenous contrast limits the evaluation of visceral and vascular structures.  The heart size is normal. There is trace left-sided pleural effusion. There is linear bandlike atelectasis within the left lower lobe.  There is cirrhotic liver morphology. There is no focal liver lesion identified by noncontrast technique. The gallbladder is present without wall thickening. There is no intrahepatic or extrahepatic biliary ductal dilatation. The spleen measures 11.7 cm in craniocaudal dimension. The adrenal glands appear within normal limits. There is fatty interdigitation of the pancreas. There is no pancreatic ductal dilatation.  The kidneys are symmetric in size. There is no hydronephrosis. There is an exophytic 3.2 cm cyst " arising from the lateral aspect of the left kidney. There is no urolithiasis. The urinary bladder is fluid-filled without wall thickening. The prostate is normal in size with central calcification.  The stomach and duodenum are normal in caliber and configuration. There are no abnormally dilated loops of small bowel to suggest small bowel obstruction or small bowel inflammation. There is a right inguinal hernia which contains the base of the cecum and the suspected appendix. There is some mild inflammatory stranding surrounding the hernia sac as well as fluid tracking into the right inguinal canal. There is a small right hydrocele. The inflammation of the hernia sac is more likely related to the hernia being strangulated or incarcerated rather than a primary appendicitis within the hernia. There is a moderate colonic stool burden. There is no free air.  There is a large infrarenal saccular aneurysm arising from the left anterior aspect of the aorta. There is no surrounding blood products to suggest acute rupture. This measures up to 7.7 cm in maximal dimension. There is no mesenteric, retroperitoneal, or pelvic lymphadenopathy by size criteria. There is degenerative disc disease at L2-L3.      Impression: 1. Large infrarenal saccular type abdominal aortic aneurysm measuring up to 7.7 cm. Vascular surgery consultation is recommended given the size. No evidence of surrounding blood products to suggest acute rupture at this time. 2. Right inguinal hernia containing portions of the base of the cecum and appendix. There is inflammatory stranding within the hernia sac and adjacent surrounding fluid. This is more concerning for a strangulated or incarcerated right inguinal hernia rather than a primary appendicitis within the hernia sac. 3. Cirrhotic liver morphology.  Findings called to Wiliam Flor at 10:55 a.m. on 5/15/2024      This report was finalized on 5/15/2024 11:04 AM by David Velez MD.            Assessment & Plan   Assessment & Plan       * No active hospital problems. *    78 yr old man with CAD HL HTN, admitted for incarcerated R inguinal hernia and incidentally found to have large AAA.     R inguinal hernia, incarcerated  - OR repair on 5/15 (Dr An)  - Cecal wedge resection due to entrapped appendix  - empiric abx for now:  reconsider based on course   - diet deferred to surgeon.  NPO for now. Await bowel recovery.    - postop care, pain control    AAA 8cm  - Dr Merida aware.  May repair it this hospital stay via EVAR.    - holding home meds (ASA Plavix) for now. Pt npo.     HTN HL CAD  - last took ASA/Plavix on 5/15 AM   - will hold for now, awaiting surgical plans   - also holding ACEI and statin due to normotensive and NPO.     Incidental finding:  cirrhotic morphology of liver  - outpt followup  - no ETOH intake   - plts 126     History of tobacco  - Quit last year.  Denies dyspnea or COPD     DVT prophylaxis:  Mechanical and medical DVT prophylaxis orders are signed and held.           CODE STATUS:    There are no questions and answers to display.       Expected Discharge   Expected discharge date/ time has not been documented.     Monique Henry MD  05/15/24

## 2024-05-15 NOTE — ANESTHESIA POSTPROCEDURE EVALUATION
Patient: Dominik Varma    Procedure Summary       Date: 05/15/24 Room / Location:  [facility] / [facility]    Anesthesia Start: 1433 Anesthesia Stop: 1732    Procedure: LAPAROSCOPIC RIGHT INGUINAL HERNIA REPAIR  AND APPENDECTOMY WITH DAVINCI ROBOT (Right: Abdomen) Diagnosis:     Surgeons: Raul nA MD Provider: Gaetano Holguin MD    Anesthesia Type: general ASA Status: 2            Anesthesia Type: general    Vitals  No vitals data found for the desired time range.          Post Anesthesia Care and Evaluation    Patient location during evaluation: PACU  Patient participation: complete - patient participated  Level of consciousness: awake  Pain score: 0  Pain management: adequate    Airway patency: patent  Anesthetic complications: No anesthetic complications  PONV Status: none  Cardiovascular status: acceptable and stable  Respiratory status: nasal cannula, unassisted, acceptable and spontaneous ventilation  Hydration status: acceptable

## 2024-05-15 NOTE — ANESTHESIA PREPROCEDURE EVALUATION
Anesthesia Evaluation     Patient summary reviewed and Nursing notes reviewed   no history of anesthetic complications:   NPO Solid Status: > 8 hours  NPO Liquid Status: > 8 hours           Airway   Mallampati: II  TM distance: >3 FB  Neck ROM: full  No difficulty expected  Dental      Pulmonary - negative pulmonary ROS and normal exam   Cardiovascular - normal exam    (+) hypertension, hyperlipidemia,  carotid artery disease      Neuro/Psych- negative ROS  GI/Hepatic/Renal/Endo    (+) GERD    Musculoskeletal     Abdominal    Substance History      OB/GYN          Other                    Anesthesia Plan    ASA 2     general     intravenous induction     Anesthetic plan, risks, benefits, and alternatives have been provided, discussed and informed consent has been obtained with: patient.    Plan discussed with CRNA.    CODE STATUS:

## 2024-05-15 NOTE — CONSULTS
Referring Provider: ANTHONY Weber  Reason for Consultation: Large abdominal aortic aneurysm    Patient Care Team:  Kassidy Bolton PA-C as PCP - General (Family Medicine)    Chief complaint abdominal pain    Subjective .     History of present illness: This is a 78-year-old male who has had a 2-day history of abdominal pain.  He presented to the emergency department with his abdominal pain.  Both physical exam and CT scan of demonstrated an incarcerated right inguinal hernia.  There is concern of inflammatory process involving the hernia sac from strangulation.  Currently he is in the preoperative evaluation bay where he is to undergo a right inguinal hernia repair.  He was also found to have a very large saccular abdominal aortic aneurysm measuring nearly 8 cm.  This is the first diagnosis he has had of the aneurysm.  His brother-in-law has  from an aortic aneurysm in the past.  He has smoked his entire adult life but quit in August of last year.  He denies chest pain or shortness of breath.  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  He does not have lower extremity claudication symptoms.  He is not using any other tobacco products.      Review of Systems  Pertinent items are noted in HPI, all other systems reviewed and negative    History  Past Medical History:   Diagnosis Date    Cerumen impaction     Hyperlipidemia     Hypertension     Impaired fasting glucose     Inguinal hernia, right     Occlusion and stenosis of unspecified carotid artery     with infarct   ,   Past Surgical History:   Procedure Laterality Date    CARDIAC CATHETERIZATION N/A 10/17/2017    Procedure: Left Heart Cath;  Surgeon: Zach Phoenix MD;  Location: [facility] CATH INVASIVE LOCATION;  Service:     CEREBRAL ANGIOGRAM      HERNIA REPAIR     ,   Family History   Problem Relation Age of Onset    Cancer Mother     Hypertension Father     Heart disease Father    ,   Social History      Socioeconomic History    Marital status:    Tobacco Use    Smoking status: Former     Current packs/day: 0.00     Average packs/day: 1 pack/day for 40.7 years (40.7 ttl pk-yrs)     Types: Cigarettes     Start date:      Quit date: 10/2017     Years since quittin.6     Passive exposure: Past    Smokeless tobacco: Never   Vaping Use    Vaping status: Never Used   Substance and Sexual Activity    Alcohol use: No    Drug use: No    Sexual activity: Defer     E-cigarette/Vaping    E-cigarette/Vaping Use Never User     Passive Exposure No      E-cigarette/Vaping Substances     E-cigarette/Vaping Devices         ,   Medications Prior to Admission   Medication Sig Dispense Refill Last Dose    aspirin 81 MG EC tablet Take 1 tablet by mouth Every Night.       atorvastatin (LIPITOR) 10 MG tablet Take 1 tablet by mouth Daily. 90 tablet 3     clopidogrel (PLAVIX) 75 MG tablet Take 1 tablet by mouth Daily. 90 tablet 3     esomeprazole (nexIUM) 20 MG capsule Take 1 capsule by mouth Every Morning Before Breakfast. OTC 90 capsule 3     lisinopril (PRINIVIL,ZESTRIL) 20 MG tablet Take 1 tablet by mouth Daily. 90 tablet 3     montelukast (SINGULAIR) 10 MG tablet Take 1 tablet by mouth Every Night. 90 tablet 3    , Scheduled Meds:  piperacillin-tazobactam, 3.375 g, Intravenous, Q8H  sodium chloride, 10 mL, Intravenous, Q12H   , Continuous Infusions:  lactated ringers, 9 mL/hr   , PRN Meds:    lactated ringers    [COMPLETED] Insert Peripheral IV **AND** [Transfer Hold] sodium chloride    sodium chloride    sodium chloride, and Allergies:  Patient has no known allergies.    Objective     Vital Signs   Temp:  [97 °F (36.1 °C)-97.5 °F (36.4 °C)] 97 °F (36.1 °C)  Heart Rate:  [64-71] 66  Resp:  [18] 18  BP: (120-135)/(79-89) 120/79    Physical Exam:     General Appearance:  Alert, cooperative, in no acute distress   Head:  Normocephalic, without obvious abnormality, atraumatic   Eyes:  Lids and lashes normal, conjunctivae  and sclerae normal, no icterus, no pallor, corneas clear, PERRLA             Neck:  No adenopathy, supple, trachea midline, no thyromegaly, no carotid bruit, no JVD   Back:  No kyphosis present, no scoliosis present, no skin lesions, erythema or scars, no tenderness to percussion or palpation, range of motion normal   Lungs:  Clear to auscultation, respirations regular, even and unlabored    Heart:  Regular rhythm and normal rate, normal S1 and S2, no murmur, no gallop, no rub, no click   Chest Wall:  No abnormalities observed   Abdomen:  Tender with distention. Right lower quadrant hernia   Rectal:  Deferred   Extremities:  Moves all extremities well, no edema, no cyanosis, no redness   Pulses:  Pulses palpable and equal bilaterally femorals.  Diminished popliteals   Skin:  No bleeding, bruising or rash   Lymph nodes:  No palpable adenopathy   Neurologic:  Cranial nerves 2 - 12 grossly intact, sensation intact, DTR present and equal bilaterally                                                                               Results Review:   I reviewed the patient's new clinical results.  I reviewed the patient's new imaging results and agree with the interpretation.  I reviewed the patient's other test results and agree with the interpretation      Assessment & Plan         This patient has a large saccular infrarenal abdominal aortic aneurysm.  His reason for hospitalization however is an incarcerated inguinal hernia.  He is being taken to the operating room for evaluation and treatment with concern of strangulation versus containing the appendix and hernia sac with appendicitis.    Nearly 8 cm infrarenal abdominal aortic aneurysm has a high risk of imminent rupture.  Especially with ongoing inflammatory processes from strangulated hernia.  I have expressed to the patient that if his hernia repair reveals no evidence of an acute infection I will proceed with an endovascular repair of his abdominal aortic aneurysm  either on Thursday or Friday if time allows in the operating room.  If there is evidence of an infection I would like for him to clear this and wait at least 2 weeks prior to proceeding with his endovascular repair.  I expressed this to the patient and his wife.    I discussed the patients findings and my recommendations with patient and family    Mono Merida MD  05/15/24  14:14 EDT    Time:  20 minutes

## 2024-05-15 NOTE — ANESTHESIA PROCEDURE NOTES
Airway  Urgency: elective    Date/Time: 5/15/2024 2:38 PM  Airway not difficult    General Information and Staff    Patient location during procedure: OR  SRNA: Cristopher Castro SRNA  Indications and Patient Condition  Indications for airway management: airway protection    Preoxygenated: yes  MILS not maintained throughout  Mask difficulty assessment: 1 - vent by mask    Final Airway Details  Final airway type: endotracheal airway      Successful airway: ETT  Cuffed: yes   Successful intubation technique: direct laryngoscopy  Endotracheal tube insertion site: oral  Blade: Elise  Blade size: 2  ETT size (mm): 7.0  Cormack-Lehane Classification: grade I - full view of glottis  Placement verified by: chest auscultation and capnometry   Measured from: lips  ETT/EBT  to lips (cm): 21  Number of attempts at approach: 1  Assessment: lips, teeth, and gum same as pre-op and atraumatic intubation    Additional Comments  Negative epigastric sounds, Breath sound equal bilaterally with symmetric chest rise and fall

## 2024-05-15 NOTE — OP NOTE
Operative Report    Patient Name:  Dominik Varma  YOB: 1945  [id number]    5/15/2024      PREOPERATIVE DIAGNOSIS: Incarcerated Right Inguinal Hernia with Bowel      POSTOPERATIVE DIAGNOSIS: Same        PROCEDURE PERFORMED:     Robotic Assisted Laparoscopic Transabdominal Right Inguinal Hernia Repair with Biologic Mesh  Laparoscopic Appendectomy and Cecal Wedge Resection      SURGEON: Raul An MD      ASSISTANT: ANTHONY Elliott. ANTHONY Brady    Assistant responsibilities: They assisted with dissection, retraction, exchange of instruments and cameras, and closure.  Their assistance was necessary and required for successful completion of the case.     SPECIMENS: Appendix and portion of cecum      ESTIMATED BLOOD LOSS: 50 mL     ANESTHESIA: General with TAP blocks.        FINDINGS:  1. A recurrent right indirect inguinal hernia was encountered which contained a portion of cecum as well as the appendix.  There was significant chronic inflammation and the appendix and hernia sac were densely adherent and incarcerated within the inguinal canal.  This was able to be reduced laparoscopically.  The appendix was removed as well as a wedge resection of the cecum.  The hernia was repaired with a 10 x 16 cm piece of phasix mesh in the preperitoneal space.       INDICATIONS:      This patient is a 78 y.o. male who presented to the emergency department today with complaints of right inguinal pain and bulge.. A history and physical exam was performed and found to be consistent with an incarcerated right inguinal hernia containing the appendix. Pre-operative imaging including CT scan confirmed the diagnosis. The risks, benefits, and alternatives of the procedure were discussed with the patient and their family preoperatively and they agreed to proceed.          DESCRIPTION OF PROCEDURE:      After obtaining informed consent, the patient was taken to the operating room and placed in the supine position on  the operating room table. General anesthesia was initiated. A Read catheter was placed. The abdomen was prepped and draped in the usual sterile fashion. A time-out was properly performed. Antibiotics were given preoperatively. SCDs were properly placed on the patient and turned on. A block was performed by the Anesthesia service prior to the start if the case.     Using an 11 blade scalpel, a supraumbilical skin incision was made in the midline.  Dissection was carried down bluntly to the level of the anterior abdominal wall fascia.  The anterior abdominal wall fascia was incised sharply, and stay sutures of 0 Vicryl were placed on either side of this fascial incision.  The peritoneal cavity was then entered under direct visualization.  A 12 mm Ludwig trocar was then inserted into the abdominal cavity and pneumoperitoneum was established at 15 mmHg. The abdomen was then surveyed with a 5 mm 30 degree laparoscope with special attention to the viscera underlying the entrance site which was found to be free of injury.  We did note that there was a cirrhotic liver morphology but no obvious ascites within the abdomen.  Next two additional 8 mm robotic trocars were placed laterally to the periumbilical trocar, one on the left and one on the right side.  An 8 mm robotic trocar was then advanced into the 12 mm Ludwig trocar. The abdomen was surveyed and laparoscopic exam demonstrated evidence of a inguinal hernia on the right side which contained a portion of the cecum and appendix. The da Avinash Xi robotic system was then docked at the patient's bedside and targeted in the pelvis.    A peritoneal incision was then created using cautery on the under-surface of the abdominal wall starting at the midline and proceeding laterally towards the ASIS on the right side. The peritoneum was then retracted downwards and the rectus muscle was swept upwards to develop the preperitoneal space.  The right preperitoneal space was dissected  from the ASIS laterally to the pubic tubercle medially.      The cecum and appendix were densely adherent to the hernia sac and incarcerated within the inguinal canal.  Dissection was quite tedious as there was significant chronic inflammation and the appendix and cecum were densely adherent to the hernia sac which was incarcerated within the inguinal canal.  Greater than 60 minutes of dissection were spent solely on freeing of the hernia sac and incarcerated contents from the inguinal canal.  This was difficult given the recurrent nature of the hernia as well as the previous indwelling mesh.  This significantly increased the complexity of the case.    With the hernia sac and contents eventually reduced this was  from the vas deferens and cord structures.  The appendix appeared chronically inflamed and the base of the cecum was quite elongated and partially involved within the hernia sac.  At this point I felt that we had no choice but to perform an appendectomy as well as a small cecal wedge resection to be able to separate the appendix from the cecum and from the hernia sac.  The right lateral trocar site was upsized to a 12 mm trocar.  A window was then created in the mesoappendix at the base of the cecum.  The appendix was then divided flush with the cecum using a 45 blue load da Avinash stapler.  The mesoappendix was then divided en bloc with a portion of elongated cecum consisting of a cecal wedge resection using 3 separate firings of a 45 blue load eventually stapler.  The staple line appeared viable.  This was all well away from the ileocecal valve.  Following this the specimen was then placed in Endo Catch bag and removed out of the right lateral trocar site.  The specimen was passed off the field as appendix and portion of cecum.    Attention was then turned to the inguinal canal.  We confirmed that the peritoneum was dissected appropriately such that it would lie flat without any folding.  Given  the concomitant bowel surgery I felt that we had no choice but to use a biologic mesh.  A piece of phasix mesh was then cut to 10 x 16 cm.  The mesh was placed within the preperitoneal space the inferior aspect of the mesh was placed inferior to Martell's ligament and covering the sites of direct, indirect, and femoral potential defects.  The mesh was secured medially to Martell's ligament and laterally to the abdominal wall using interrupted 2-0 Vicryl suture.  With this completed, the peritoneal flap was then elevated with great care to ensure proper apposition of the mesh between the peritoneum and rectus muscle without folding. The peritoneal flap was then closed using an absorbable 2-0 Stratafix suture.  The da Avinash XI robotic system was then undocked from the patient's bedside.           The abdomen was then carefully surveyed and hemostasis confirmed.  The fascia of the right sided 12 mm trocar site was closed under direct laparoscopic visualization utilizing a Alvin-Fitz device with an 0 Vicryl suture.  The remaining 8 mm trocar was removed.  The fascia of the midline trocar site was then closed using a figure-of-eight 0 Vicryl suture under direct visualization.  Hemostasis of all wound sites was confirmed and each wound site was irrigated. All skin incisions were than closed with 4-0 Monocryl in the subcuticular layer. Mastisol and steri strips were then applied overlying each incision site.      After the procedure, the patient was awakened, extubated, and taken to the postoperative anesthesia care unit for recovery. All needle, instrument, and lap counts were correct. I was personally present and performed all portions of the procedure. There were no immediate complications         Raul An MD  5/15/2024  17:16 EDT

## 2024-05-15 NOTE — BRIEF OP NOTE
INGUINAL HERNIA BILATERAL REPAIR LAPAROSCOPIC WITH DAVINCI ROBOT  Progress Note    Dominik Varma  5/15/2024    Pre-op Diagnosis:   Incarcerated right inguinal hernia       Post-Op Diagnosis Codes:   same    Procedure/CPT® Codes:        Procedure(s):  LAPAROSCOPIC RIGHT INGUINAL HERNIA REPAIR WITH BIOLOGIC MESH PLACEMENT AND APPENDECTOMY WITH DAVINCI ROBOT              Surgeon(s):  Raul An MD    Anesthesia: General    Staff:   Circulator: Dulce Rosario RN; Yolanda Sage RN  Physician Assistant: Saige Collado PA  Scrub Person: Munira Cruz  Nursing Assistant: Addison Kathleen PCT  Assistant: Khris Ocasio PA-C  Assistant: Khris Ocasio PA-C      Estimated Blood Loss: 50 mL    Urine Voided: * No values recorded between 5/15/2024  2:31 PM and 5/15/2024  5:10 PM *    Specimens:                Specimens       ID Source Type Tests Collected By Collected At Frozen?    A Large Intestine, Appendix Tissue TISSUE PATHOLOGY EXAM   Raul An MD 5/15/24 1705 No    Description: APPENDIX AND PORTION OF CECUM                  Drains:   Urethral Catheter Non-latex;Silicone 16 Fr. (Active)       Findings: Incarcerated right inguinal hernia containing a portion of the cecum as well as the appendix.  Significant chronic inflammation.  This was able to be reduced and the hernia was repaired with a biologic mesh.  The appendix was removed as all as a cecal wedge resection        Complications: None immediate    Assistant: Khris Ocasio PA-C  was responsible for performing the following activities: Retraction, Suction, Irrigation, Suturing, Closing, and Held/Positioned Camera and their skilled assistance was necessary for the success of this case.    Raul An MD     Date: 5/15/2024  Time: 17:10 EDT

## 2024-05-16 LAB
ALBUMIN SERPL-MCNC: 3.7 G/DL (ref 3.5–5.2)
ALBUMIN/GLOB SERPL: 1.2 G/DL
ALP SERPL-CCNC: 78 U/L (ref 39–117)
ALT SERPL W P-5'-P-CCNC: 10 U/L (ref 1–41)
ANION GAP SERPL CALCULATED.3IONS-SCNC: 13 MMOL/L (ref 5–15)
AST SERPL-CCNC: 14 U/L (ref 1–40)
BASOPHILS # BLD AUTO: 0.01 10*3/MM3 (ref 0–0.2)
BASOPHILS NFR BLD AUTO: 0.1 % (ref 0–1.5)
BILIRUB SERPL-MCNC: 2 MG/DL (ref 0–1.2)
BUN SERPL-MCNC: 17 MG/DL (ref 8–23)
BUN/CREAT SERPL: 20.5 (ref 7–25)
CALCIUM SPEC-SCNC: 9 MG/DL (ref 8.6–10.5)
CHLORIDE SERPL-SCNC: 101 MMOL/L (ref 98–107)
CO2 SERPL-SCNC: 21 MMOL/L (ref 22–29)
CREAT SERPL-MCNC: 0.83 MG/DL (ref 0.76–1.27)
DEPRECATED RDW RBC AUTO: 54.4 FL (ref 37–54)
EGFRCR SERPLBLD CKD-EPI 2021: 90.1 ML/MIN/1.73
EOSINOPHIL # BLD AUTO: 0 10*3/MM3 (ref 0–0.4)
EOSINOPHIL NFR BLD AUTO: 0 % (ref 0.3–6.2)
ERYTHROCYTE [DISTWIDTH] IN BLOOD BY AUTOMATED COUNT: 16.1 % (ref 12.3–15.4)
GLOBULIN UR ELPH-MCNC: 3 GM/DL
GLUCOSE SERPL-MCNC: 154 MG/DL (ref 65–99)
HCT VFR BLD AUTO: 36.2 % (ref 37.5–51)
HGB BLD-MCNC: 11.5 G/DL (ref 13–17.7)
IMM GRANULOCYTES # BLD AUTO: 0.06 10*3/MM3 (ref 0–0.05)
IMM GRANULOCYTES NFR BLD AUTO: 0.6 % (ref 0–0.5)
LYMPHOCYTES # BLD AUTO: 0.5 10*3/MM3 (ref 0.7–3.1)
LYMPHOCYTES NFR BLD AUTO: 4.9 % (ref 19.6–45.3)
MCH RBC QN AUTO: 29.3 PG (ref 26.6–33)
MCHC RBC AUTO-ENTMCNC: 31.8 G/DL (ref 31.5–35.7)
MCV RBC AUTO: 92.1 FL (ref 79–97)
MONOCYTES # BLD AUTO: 0.85 10*3/MM3 (ref 0.1–0.9)
MONOCYTES NFR BLD AUTO: 8.4 % (ref 5–12)
NEUTROPHILS NFR BLD AUTO: 8.75 10*3/MM3 (ref 1.7–7)
NEUTROPHILS NFR BLD AUTO: 86 % (ref 42.7–76)
NRBC BLD AUTO-RTO: 0 /100 WBC (ref 0–0.2)
PLATELET # BLD AUTO: 117 10*3/MM3 (ref 140–450)
PMV BLD AUTO: 11.4 FL (ref 6–12)
POTASSIUM SERPL-SCNC: 4.7 MMOL/L (ref 3.5–5.2)
PROT SERPL-MCNC: 6.7 G/DL (ref 6–8.5)
RBC # BLD AUTO: 3.93 10*6/MM3 (ref 4.14–5.8)
SODIUM SERPL-SCNC: 135 MMOL/L (ref 136–145)
WBC NRBC COR # BLD AUTO: 10.17 10*3/MM3 (ref 3.4–10.8)

## 2024-05-16 PROCEDURE — 25810000003 LACTATED RINGERS PER 1000 ML: Performed by: SURGERY

## 2024-05-16 PROCEDURE — 25010000002 PIPERACILLIN SOD-TAZOBACTAM PER 1 G: Performed by: SURGERY

## 2024-05-16 PROCEDURE — 97162 PT EVAL MOD COMPLEX 30 MIN: CPT

## 2024-05-16 PROCEDURE — 99232 SBSQ HOSP IP/OBS MODERATE 35: CPT | Performed by: INTERNAL MEDICINE

## 2024-05-16 PROCEDURE — 80053 COMPREHEN METABOLIC PANEL: CPT | Performed by: INTERNAL MEDICINE

## 2024-05-16 PROCEDURE — 85025 COMPLETE CBC W/AUTO DIFF WBC: CPT | Performed by: SURGERY

## 2024-05-16 PROCEDURE — 25010000002 HEPARIN (PORCINE) PER 1000 UNITS: Performed by: SURGERY

## 2024-05-16 PROCEDURE — 25010000002 METOCLOPRAMIDE PER 10 MG: Performed by: SURGERY

## 2024-05-16 RX ORDER — METOCLOPRAMIDE HYDROCHLORIDE 5 MG/ML
10 INJECTION INTRAMUSCULAR; INTRAVENOUS EVERY 6 HOURS
Status: DISCONTINUED | OUTPATIENT
Start: 2024-05-16 | End: 2024-05-19 | Stop reason: HOSPADM

## 2024-05-16 RX ADMIN — HEPARIN SODIUM 5000 UNITS: 5000 INJECTION INTRAVENOUS; SUBCUTANEOUS at 14:54

## 2024-05-16 RX ADMIN — PIPERACILLIN SODIUM AND TAZOBACTAM SODIUM 3.38 G: 3; .375 INJECTION, POWDER, LYOPHILIZED, FOR SOLUTION INTRAVENOUS at 03:13

## 2024-05-16 RX ADMIN — DOCUSATE SODIUM 100 MG: 100 CAPSULE, LIQUID FILLED ORAL at 08:48

## 2024-05-16 RX ADMIN — OXYCODONE HYDROCHLORIDE AND ACETAMINOPHEN 2 TABLET: 5; 325 TABLET ORAL at 12:45

## 2024-05-16 RX ADMIN — Medication 10 ML: at 21:05

## 2024-05-16 RX ADMIN — METOCLOPRAMIDE 10 MG: 5 INJECTION, SOLUTION INTRAMUSCULAR; INTRAVENOUS at 21:05

## 2024-05-16 RX ADMIN — SODIUM CHLORIDE, POTASSIUM CHLORIDE, SODIUM LACTATE AND CALCIUM CHLORIDE 125 ML/HR: 600; 310; 30; 20 INJECTION, SOLUTION INTRAVENOUS at 05:21

## 2024-05-16 RX ADMIN — OXYCODONE HYDROCHLORIDE AND ACETAMINOPHEN 2 TABLET: 5; 325 TABLET ORAL at 03:34

## 2024-05-16 RX ADMIN — SODIUM CHLORIDE, POTASSIUM CHLORIDE, SODIUM LACTATE AND CALCIUM CHLORIDE 50 ML/HR: 600; 310; 30; 20 INJECTION, SOLUTION INTRAVENOUS at 18:23

## 2024-05-16 RX ADMIN — PANTOPRAZOLE SODIUM 40 MG: 40 TABLET, DELAYED RELEASE ORAL at 05:22

## 2024-05-16 RX ADMIN — PIPERACILLIN SODIUM AND TAZOBACTAM SODIUM 3.38 G: 3; .375 INJECTION, POWDER, LYOPHILIZED, FOR SOLUTION INTRAVENOUS at 10:44

## 2024-05-16 RX ADMIN — DOCUSATE SODIUM 100 MG: 100 CAPSULE, LIQUID FILLED ORAL at 21:05

## 2024-05-16 RX ADMIN — HEPARIN SODIUM 5000 UNITS: 5000 INJECTION INTRAVENOUS; SUBCUTANEOUS at 21:05

## 2024-05-16 RX ADMIN — METOCLOPRAMIDE 10 MG: 5 INJECTION, SOLUTION INTRAMUSCULAR; INTRAVENOUS at 15:36

## 2024-05-16 RX ADMIN — BISACODYL 10 MG: 5 TABLET, COATED ORAL at 08:48

## 2024-05-16 NOTE — THERAPY EVALUATION
Patient Name: Dominik Varma  : 1945    MRN: [id number]                              Today's Date: 2024       Admit Date: 5/15/2024    Visit Dx:     ICD-10-CM ICD-9-CM   1. Incarcerated inguinal hernia  K40.30 550.10   2. Infrarenal abdominal aortic aneurysm (AAA) without rupture  I71.43 441.4   3. Incarcerated right inguinal hernia  K40.30 550.10     Patient Active Problem List   Diagnosis    Cerebrovascular disease    Hyperlipidemia    Essential hypertension, benign    Abnormal stress test    Gastroesophageal reflux disease    Microscopic colitis    Incarcerated right inguinal hernia     Past Medical History:   Diagnosis Date    Cerumen impaction     Hyperlipidemia     Hypertension     Impaired fasting glucose     Inguinal hernia, right     Occlusion and stenosis of unspecified carotid artery     with infarct     Past Surgical History:   Procedure Laterality Date    CARDIAC CATHETERIZATION N/A 10/17/2017    Procedure: Left Heart Cath;  Surgeon: Zach Phoenix MD;  Location:  [facility];  Service:     CEREBRAL ANGIOGRAM      HERNIA REPAIR      INGUINAL HERNIA REPAIR Right 5/15/2024    Procedure: LAPAROSCOPIC RIGHT INGUINAL HERNIA REPAIR  AND APPENDECTOMY WITH DAVINCI ROBOT;  Surgeon: Raul An MD;  Location: [facility] OR;  Service: General;  Laterality: Right;      General Information       Row Name 24 1015          Physical Therapy Time and Intention    Document Type evaluation  -KW     Mode of Treatment individual therapy;physical therapy  -       Row Name 24 1015          General Information    Patient Profile Reviewed yes  -KW     Prior Level of Function independent:;all household mobility;community mobility;gait;transfer;bed mobility  -KW     Existing Precautions/Restrictions fall  -KW     Barriers to Rehab medically complex  -KW       Row Name 24 1015          Living Environment    People in Home spouse  -KW       Row Name 24 1015           Home Main Entrance    Number of Stairs, Main Entrance two  -KW       Row Name 05/16/24 1015          Stairs Within Home, Primary    Number of Stairs, Within Home, Primary none  -KW       Row Name 05/16/24 1015          Cognition    Orientation Status (Cognition) oriented x 3  -KW       Row Name 05/16/24 1015          Safety Issues, Functional Mobility    Impairments Affecting Function (Mobility) balance;endurance/activity tolerance;pain;shortness of breath;strength  -KW               User Key  (r) = Recorded By, (t) = Taken By, (c) = Cosigned By      Initials Name Provider Type    KW Stacey Hudson PT Physical Therapist                   Mobility       Row Name 05/16/24 1015          Bed Mobility    Bed Mobility supine-sit  -KW     Supine-Sit [place] (Bed Mobility) verbal cues;contact guard  -KW     Assistive Device (Bed Mobility) bed rails  -KW       Row Name 05/16/24 1015          Sit-Stand Transfer    Sit-Stand [place] (Transfers) supervision  -KW       Row Name 05/16/24 1015          Gait/Stairs (Locomotion)    [place] Level (Gait) supervision  -KW     Distance in Feet (Gait) 600  -KW     Deviations/Abnormal Patterns (Gait) stride length decreased  -KW     Bilateral Gait Deviations decreased arm swing;heel strike decreased  -KW               User Key  (r) = Recorded By, (t) = Taken By, (c) = Cosigned By      Initials Name Provider Type    KW Stacey Hudson PT Physical Therapist                   Obj/Interventions       Row Name 05/16/24 1015          Strength Comprehensive (MMT)    General Manual Muscle Testing (MMT) Assessment no strength deficits identified  -KW     Comment, General Manual Muscle Testing (MMT) Assessment B LE 5/5  -KW       Row Name 05/16/24 1015          Balance    Balance Assessment sitting static balance;sitting dynamic balance;sit to stand dynamic balance;standing static balance;standing dynamic balance  -KW     Static Sitting Balance independent  -KW      Dynamic Sitting Balance independent  -KW     Position, Sitting Balance sitting edge of bed;unsupported  -KW     Static Standing Balance supervision  -KW     Dynamic Standing Balance supervision  -KW     Position/Device Used, Standing Balance unsupported  -KW     Balance Interventions sitting;standing;sit to stand  -KW               User Key  (r) = Recorded By, (t) = Taken By, (c) = Cosigned By      Initials Name Provider Type    KW Stacey Hudson PT Physical Therapist                   Goals/Plan       Row Name 05/16/24 1015          Bed Mobility Goal 1 (PT)    Activity/Assistive Device (Bed Mobility Goal 1, PT) sit to supine;supine to sit  -KW     [place] Level/Cues Needed (Bed Mobility Goal 1, PT) independent  -KW     Time Frame (Bed Mobility Goal 1, PT) 10 days  -KW       Row Name 05/16/24 1015          Transfer Goal 1 (PT)    Activity/Assistive Device (Transfer Goal 1, PT) sit-to-stand/stand-to-sit;bed-to-chair/chair-to-bed  -KW     [place] Level/Cues Needed (Transfer Goal 1, PT) independent  -KW     Time Frame (Transfer Goal 1, PT) 10 days  -KW       Row Name 05/16/24 1015          Gait Training Goal 1 (PT)    Activity/Assistive Device (Gait Training Goal 1, PT) assistive device use;decrease fall risk;gait (walking locomotion);diminish gait deviation;improve balance and speed;increase endurance/gait distance  -KW     [place] Level (Gait Training Goal 1, PT) independent  -KW     Distance (Gait Training Goal 1, PT) 500  -KW     Time Frame (Gait Training Goal 1, PT) 10 days  -KW               User Key  (r) = Recorded By, (t) = Taken By, (c) = Cosigned By      Initials Name Provider Type    KW Stacey Hudson PT Physical Therapist                   Clinical Impression       Row Name 05/16/24 1015          Pain    Pretreatment Pain Rating 0/10 - no pain  -KW       Row Name 05/16/24 1015          Plan of Care Review    Plan of Care Reviewed With patient  -KW     Progress no change  -KW      Outcome Evaluation PT eval completed. Patient edcuated about log roll and pressure management, able to demonstrate log roll with cues. Patient with stiffened ambulation due to increased abdominal pain. Patient presents below baseline for functional mobility. Patient demonstrates decreased activity tolerance, deconditioning and reduced dynamic balance. Patient would continue to benefit from skilled PT services to improve dynamic balance with gait, transfers strength, and activity tolerance training in order to build endurance and reduce risk of falls.  -KW       Row Name 05/16/24 1015          Therapy Assessment/Plan (PT)    Rehab Potential (PT) good, to achieve stated therapy goals  -KW     Criteria for Skilled Interventions Met (PT) yes;skilled treatment is necessary  -KW     Therapy Frequency (PT) daily  -KW       Row Name 05/16/24 1015          Vital Signs    Pre Systolic BP Rehab 123  -KW     Pre Treatment Diastolic BP 73  -KW     Pretreatment Heart Rate (beats/min) 76  -KW     Pre SpO2 (%) 98  -KW       Row Name 05/16/24 1015          Positioning and Restraints    Pre-Treatment Position in bed  -KW     Post Treatment Position chair  -KW     In Chair reclined;call light within reach;encouraged to call for assist;exit alarm on;legs elevated  -KW               User Key  (r) = Recorded By, (t) = Taken By, (c) = Cosigned By      Initials Name Provider Type    KW Stacey Hudson, PT Physical Therapist                   Outcome Measures       Row Name 05/16/24 1015 05/16/24 0802       How much help from another person do you currently need...    Turning from your back to your side while in flat bed without using bedrails? 3  -KW 3  -CM    Moving from lying on back to sitting on the side of a flat bed without bedrails? 3  -KW 3  -CM    Moving to and from a bed to a chair (including a wheelchair)? 3  -KW 3  -CM    Standing up from a chair using your arms (e.g., wheelchair, bedside chair)? 3  -KW 3  -CM    Climbing  3-5 steps with a railing? 3  -KW 4  -CM    To walk in hospital room? 3  -KW 4  -CM    AM-PAC 6 Clicks Score (PT) 18  -KW 20  -CM    Highest Level of Mobility Goal 6 --> Walk 10 steps or more  -KW 6 --> Walk 10 steps or more  -CM      Row Name 05/16/24 1015          Functional Assessment    Outcome Measure Options AM-PAC 6 Clicks Basic Mobility (PT)  -KW               User Key  (r) = Recorded By, (t) = Taken By, (c) = Cosigned By      Initials Name Provider Type    KW Stacey Hudson, PT Physical Therapist    Stephanie Santiago RN Registered Nurse                                   PT Recommendation and Plan     Plan of Care Reviewed With: patient  Progress: no change  Outcome Evaluation: PT eval completed. Patient edcuated about log roll and pressure management, able to demonstrate log roll with cues. Patient with stiffened ambulation due to increased abdominal pain. Patient presents below baseline for functional mobility. Patient demonstrates decreased activity tolerance, deconditioning and reduced dynamic balance. Patient would continue to benefit from skilled PT services to improve dynamic balance with gait, transfers strength, and activity tolerance training in order to build endurance and reduce risk of falls.     Time Calculation:   PT Evaluation Complexity  History, PT Evaluation Complexity: 3 or more personal factors and/or comorbidities  Examination of Body Systems (PT Eval Complexity): total of 3 or more elements  Clinical Presentation (PT Evaluation Complexity): evolving  Clinical Decision Making (PT Evaluation Complexity): moderate complexity  Overall Complexity (PT Evaluation Complexity): moderate complexity     PT Charges       Row Name 05/16/24 1015             Time Calculation    Start Time 1015  -KW      PT Received On 05/16/24  -KW      PT Goal Re-Cert Due Date 05/26/24  -KW         Untimed Charges    PT Eval/Re-eval Minutes 53  -KW         Total Minutes    Untimed Charges Total Minutes 53   -KW       Total Minutes 53  -KW                User Key  (r) = Recorded By, (t) = Taken By, (c) = Cosigned By      Initials Name Provider Type    Stacey Noble, ILENE Physical Therapist                  Therapy Charges for Today       Code Description Service Date Service Provider Modifiers Qty    58267076091 HC PT EVAL MOD COMPLEXITY 4 5/16/2024 Stacey Hudson PT GP 1            PT G-Codes  Outcome Measure Options: AM-PAC 6 Clicks Basic Mobility (PT)  AM-PAC 6 Clicks Score (PT): 18  PT Discharge Summary  Anticipated Discharge Disposition (PT): home with home health    Stacey Hudson PT  5/16/2024

## 2024-05-16 NOTE — PROGRESS NOTES
"Patient Name:  Dominik Varma  YOB: 1945  [id number]    Surgery Progress Note    Date of visit: 5/16/2024      Subjective: No acute events. Feels much better than when he presented yesterday with significant improvement in abdominal and right groin pain. No nausea or vomiting. No flatus or BM yet. Has had some sips of water. Had some bleeding from a bandage requiring change overnight           Objective:     /89 (BP Location: Left arm, Patient Position: Sitting)   Pulse 68   Temp 98.4 °F (36.9 °C) (Oral)   Resp 18   Ht 182.9 cm (72\")   Wt 77.1 kg (170 lb)   SpO2 94%   BMI 23.06 kg/m²     Intake/Output Summary (Last 24 hours) at 5/16/2024 1304  Last data filed at 5/16/2024 1044  Gross per 24 hour   Intake 3000 ml   Output 2070 ml   Net 930 ml       GEN:   Awake, alert, in no acute distress, resting comfortably in bed   CV:   Regular rate and rhythm  L:  Symmetric expansion, not labored on room air  Abd:  Soft, appropriately tender along incisions, incisions clean with some small bloody spotting on the dressings, mild tenderness over the right groin which is significantly improved since yesterday  Ext:  No cyanosis, clubbing, or edema    Recent labs that are back at this time have been reviewed.           Assessment/ Plan:    Mr. Varma is a 78 year old gentleman who is admitted after presented on 5/15/24 with an incarcerated right inguinal hernia containing appendix and portion of cecum    #Incarcerated right inguinal hernia containing bowel  -postop day 1 laparoscopic repair with biologic mesh and cecal wedge resection  -Labs and vitals stable  -Continue oral pain meds  -Discontinue hemphill today  -There was evidence of chronic inflammation of the appendix and cecum without koby purulence, necrosis, or fecal contamination. Will discontinue antibiotics  -Continue clear liquid diet  -Awaiting return of bowel function  -Ok to restart Asa and plavix from my standpoint  -Mobilize      Raul V " MD Juve  5/16/2024  13:04 EDT

## 2024-05-16 NOTE — PROGRESS NOTES
LOS: 1 day   Patient Care Team:  Kassidy Bolton PA-C as PCP - General (Family Medicine)      Subjective   Mr. Varma is alert and sitting in his bedside chair with no family in the room. He denies any new concerns or acute events overnight.     History taken from: patient    Objective     Vital Signs  Temp:  [97 °F (36.1 °C)-98.3 °F (36.8 °C)] 98 °F (36.7 °C)  Heart Rate:  [58-76] 60  Resp:  [16-18] 18  BP: (115-145)/(63-86) 123/73      Physical Exam  AAOx3, NAD, no family at bedside  Sitting in bedside chair  Respiratory: normal effort  Abdomen: tender to palpation following recent surgery, slight distention  BILATERAL LE: warm to touch, no edema or erythema noted    Results Review:     I reviewed the patient's new clinical results.  CBC    Results from last 7 days   Lab Units 05/16/24  0526 05/15/24  1107   WBC 10*3/mm3 10.17 10.14   HEMOGLOBIN g/dL 11.5* 12.4*   PLATELETS 10*3/mm3 117* 126*     BMP   Results from last 7 days   Lab Units 05/16/24  0526 05/15/24  1107   SODIUM mmol/L 135* 138   POTASSIUM mmol/L 4.7 4.4   CHLORIDE mmol/L 101 104   CO2 mmol/L 21.0* 25.0   BUN mg/dL 17 18   CREATININE mg/dL 0.83 1.06   GLUCOSE mg/dL 154* 107*          Current Facility-Administered Medications:     bisacodyl (DULCOLAX) EC tablet 10 mg, 10 mg, Oral, Daily, Raul An MD, 10 mg at 05/16/24 0848    docusate sodium (COLACE) capsule 100 mg, 100 mg, Oral, BID, Raul An MD, 100 mg at 05/16/24 0848    heparin (porcine) 5000 UNIT/ML injection 5,000 Units, 5,000 Units, Subcutaneous, Q8H, Raul An MD    lactated ringers infusion, 9 mL/hr, Intravenous, Continuous PRN, Raul An MD    lactated ringers infusion, 50 mL/hr, Intravenous, Continuous, Raul An MD, Last Rate: 50 mL/hr at 05/16/24 0849, 50 mL/hr at 05/16/24 0849    morphine injection 2 mg, 2 mg, Intravenous, Q2H PRN **AND** naloxone (NARCAN) injection 0.4 mg, 0.4 mg, Intravenous, Q5 Min PRN, Raul An MD     nitroglycerin (NITROSTAT) SL tablet 0.4 mg, 0.4 mg, Sublingual, Q5 Min PRN, Monique Henry MD    ondansetron ODT (ZOFRAN-ODT) disintegrating tablet 4 mg, 4 mg, Oral, Q6H PRN **OR** ondansetron (ZOFRAN) injection 4 mg, 4 mg, Intravenous, Q6H PRN, Raul An MD    oxyCODONE-acetaminophen (PERCOCET) 5-325 MG per tablet 2 tablet, 2 tablet, Oral, Q4H PRN, Raul An MD, 2 tablet at 05/16/24 0334    pantoprazole (PROTONIX) EC tablet 40 mg, 40 mg, Oral, Q AM, Raul An MD, 40 mg at 05/16/24 0522    piperacillin-tazobactam (ZOSYN) 3.375 g IVPB in 100 mL NS MBP (CD), 3.375 g, Intravenous, Q8H, Raul An MD, 3.375 g at 05/16/24 1044    [COMPLETED] Insert Peripheral IV, , , Once **AND** sodium chloride 0.9 % flush 10 mL, 10 mL, Intravenous, PRN, Raul An MD     Assessment & Plan   8cm Infrarenal Abdominal Aortic Aneurysm  -During this patient's recent hernia repair, there were notable indications of significant inflammation necessitating an appendectomy and cecal wedge resection.  Consequently, the planned endovascular repair of the aortic aneurysm will be deferred.  The patient will be advised to return for a follow-up within 1 to 2 weeks, during which further consultation with Dr. Merida will be conducted to determine timing of anticipated procedure.  - Patient is stable for discharge from a vascular standpoint when medically optimized  - FU with Dr. Merida 5/28/24 @ 10:30AM    Case discussed with Dr. Merida whom helped formulate above plan of care.    Gale Oconnor PA-C  05/16/24  11:05 EDT

## 2024-05-16 NOTE — PROGRESS NOTES
[facility] Medicine Services  PROGRESS NOTE    Patient Name: Dominik Varma  : 1945  MRN: [id number]    Date of Admission: 5/15/2024  Primary Care Physician: Kassidy Bolton PA-C    Subjective   Subjective     CC:  F/u hernia, AAA    HPI:  Had some bleeding from one of his laparoscopic sites last night. Otherwise feels sore but much better. On a diet this AM and tolerating so far, no BM      Objective   Objective     Vital Signs:   Temp:  [97 °F (36.1 °C)-98.4 °F (36.9 °C)] 98.4 °F (36.9 °C)  Heart Rate:  [58-76] 68  Resp:  [16-18] 18  BP: (115-175)/(63-89) 175/89  Flow (L/min):  [2] 2     Physical Exam:  Constitutional: Awake, alert, laying in bed in NAD  Respiratory: Clear to auscultation bilaterally, respiratory effort normal   Cardiovascular: RRR, palpable radial pulse  Gastrointestinal: Abd soft w/ gaseous distention, BS present, minimally tender, one bandage over lap-sites saturated w/ blood  Psychiatric: Appropriate affect, cooperative  Neurologic: Speech clear and fluent    Results Reviewed:  LAB RESULTS:      Lab 24  0526 05/15/24  1131 05/15/24  1107   WBC 10.17  --  10.14   HEMOGLOBIN 11.5*  --  12.4*   HEMATOCRIT 36.2*  --  39.6   PLATELETS 117*  --  126*   NEUTROS ABS 8.75*  --  7.69*   IMMATURE GRANS (ABS) 0.06*  --  0.05   LYMPHS ABS 0.50*  --  1.14   MONOS ABS 0.85  --  0.98*   EOS ABS 0.00  --  0.25   MCV 92.1  --  93.4   PROTIME  --  15.4*  --          Lab 24  0526 05/15/24  1107   SODIUM 135* 138   POTASSIUM 4.7 4.4   CHLORIDE 101 104   CO2 21.0* 25.0   ANION GAP 13.0 9.0   BUN 17 18   CREATININE 0.83 1.06   EGFR 90.1 72.3   GLUCOSE 154* 107*   CALCIUM 9.0 9.6         Lab 24  0526 05/15/24  1107   TOTAL PROTEIN 6.7 6.9   ALBUMIN 3.7 3.9   GLOBULIN 3.0 3.0   ALT (SGPT) 10 12   AST (SGOT) 14 16   BILIRUBIN 2.0* 2.8*   ALK PHOS 78 85         Lab 05/15/24  1131   PROTIME 15.4*   INR 1.20*                 Brief Urine Lab Results  (Last result in  the past 365 days)        Color   Clarity   Blood   Leuk Est   Nitrite   Protein   CREAT   Urine HCG        05/15/24 1107 Yellow   Clear   Negative   Negative   Negative   Negative                   Microbiology Results Abnormal       None            Peripheral Block    Result Date: 5/15/2024  Cristopher Castro, TOMASA     5/15/2024  3:03 PM Peripheral Block Patient reassessed immediately prior to procedure Patient location during procedure: OR Start time: 5/15/2024 2:42 PM Reason for block: at surgeon's request and post-op pain management Performed by CRNA/CAA: Regulo Nguyen CRNASRNA: Cristopher Castro, TOMASA Preanesthetic Checklist Completed: patient identified, IV checked, site marked, risks and benefits discussed, surgical consent, monitors and equipment checked, pre-op evaluation and timeout performed Prep: Pt Position: supine Sterile barriers:cap, gloves, mask and washed/disinfected hands Prep: ChloraPrep Patient monitoring: blood pressure monitoring, continuous pulse oximetry and EKG Procedure Sedation: yes Performed under: general Guidance:ultrasound guided Images:still images obtained, printed/placed on chart Laterality:Bilateral Block Type:TAP Injection Technique:single-shot Needle Type:echogenic Needle Gauge:20 G Resistance on Injection: none Medications Used: bupivacaine PF (MARCAINE) 0.25 % injection - Injection  60 mL - 5/15/2024 2:42:00 PM dexamethasone sodium phosphate injection - Injection  4 mg - 5/15/2024 2:42:00 PM Medications Comment:Block Injection:  LA dose divided between Right and Left block Post Assessment Injection Assessment: negative aspiration for heme, incremental injection and no paresthesia on injection Patient Tolerance:comfortable throughout block Complications:no Additional Notes Mid-Axillary/Lateral TAPs A high-frequency linear transducer, with sterile cover, was placed in the midaxillary line between the ASIS and costal margin. The External Oblique Muscle (EOM),  "Internal Oblique Muscle (IOM), Transverse Abdominus Muscle (CABRERA), and Peritoneum were identified. The insertion site was prepped in sterile fashion and then localized with 2-5 ml of 1% Lidocaine. Using ultrasound-guidance, a 20-gauge B-Munoz 4\" Ultraplex 360 non-stimulating echogenic needle was advanced in plane, from medial to lateral, until the tip of the needle was in the fascial plane between the IOM and CABRERA. 1-3ml of preservative free normal saline was used to hydro-dissect the fascial planes. After the fascial plane was verified, the local anesthetic (LA) was injected. The procedure was repeated on the opposite side for bilateral coverage. Aspiration every 5 ml to prevent intravascular injection. Injection was completed with negative aspiration of blood and negative intravascular injection. Injection pressures were normal with minimal resistance. Midaxillary TAPs should reach intercostal nerves T10- T11 and the subcostal nerve T12.      CT Abdomen Pelvis Without Contrast    Result Date: 5/15/2024  EXAM: CT ABDOMEN PELVIS WO CONTRAST-  DATE OF EXAM: 5/15/2024 10:23 AM  INDICATION: Right inguinal pain..  COMPARISON: None available  TECHNIQUE: Contiguous axial CT images were obtained from the lung bases to the pubic symphysis without contrast. Sagittal and coronal reconstructions were performed.  Automated exposure control and iterative reconstruction methods were used.  FINDINGS: The lack of intravenous contrast limits the evaluation of visceral and vascular structures.  The heart size is normal. There is trace left-sided pleural effusion. There is linear bandlike atelectasis within the left lower lobe.  There is cirrhotic liver morphology. There is no focal liver lesion identified by noncontrast technique. The gallbladder is present without wall thickening. There is no intrahepatic or extrahepatic biliary ductal dilatation. The spleen measures 11.7 cm in craniocaudal dimension. The adrenal glands appear within " normal limits. There is fatty interdigitation of the pancreas. There is no pancreatic ductal dilatation.  The kidneys are symmetric in size. There is no hydronephrosis. There is an exophytic 3.2 cm cyst arising from the lateral aspect of the left kidney. There is no urolithiasis. The urinary bladder is fluid-filled without wall thickening. The prostate is normal in size with central calcification.  The stomach and duodenum are normal in caliber and configuration. There are no abnormally dilated loops of small bowel to suggest small bowel obstruction or small bowel inflammation. There is a right inguinal hernia which contains the base of the cecum and the suspected appendix. There is some mild inflammatory stranding surrounding the hernia sac as well as fluid tracking into the right inguinal canal. There is a small right hydrocele. The inflammation of the hernia sac is more likely related to the hernia being strangulated or incarcerated rather than a primary appendicitis within the hernia. There is a moderate colonic stool burden. There is no free air.  There is a large infrarenal saccular aneurysm arising from the left anterior aspect of the aorta. There is no surrounding blood products to suggest acute rupture. This measures up to 7.7 cm in maximal dimension. There is no mesenteric, retroperitoneal, or pelvic lymphadenopathy by size criteria. There is degenerative disc disease at L2-L3.      Impression: 1. Large infrarenal saccular type abdominal aortic aneurysm measuring up to 7.7 cm. Vascular surgery consultation is recommended given the size. No evidence of surrounding blood products to suggest acute rupture at this time. 2. Right inguinal hernia containing portions of the base of the cecum and appendix. There is inflammatory stranding within the hernia sac and adjacent surrounding fluid. This is more concerning for a strangulated or incarcerated right inguinal hernia rather than a primary appendicitis within  the hernia sac. 3. Cirrhotic liver morphology.  Findings called to Wiliam Flor at 10:55 a.m. on 5/15/2024      This report was finalized on 5/15/2024 11:04 AM by David Velez MD.           Current medications:  Scheduled Meds:bisacodyl, 10 mg, Oral, Daily  docusate sodium, 100 mg, Oral, BID  heparin (porcine), 5,000 Units, Subcutaneous, Q8H  pantoprazole, 40 mg, Oral, Q AM  piperacillin-tazobactam, 3.375 g, Intravenous, Q8H      Continuous Infusions:lactated ringers, 9 mL/hr  lactated ringers, 50 mL/hr, Last Rate: 50 mL/hr (05/16/24 0849)      PRN Meds:.  lactated ringers    Morphine **AND** naloxone    nitroglycerin    ondansetron ODT **OR** ondansetron    oxyCODONE-acetaminophen    [COMPLETED] Insert Peripheral IV **AND** sodium chloride    Assessment & Plan   Assessment & Plan     Active Hospital Problems    Diagnosis  POA    **Incarcerated right inguinal hernia [K40.30]  Yes      Resolved Hospital Problems   No resolved problems to display.        Brief Hospital Course to date:  Dominik Varma is a 78 y.o. male w/ CAD, HTN, HLD, prior stroke, remote RT inguinal hernia repair, who presented w/ 2 days RT groin pain, was found to have incarcerated hernia and was taken emergently to surgery; hospital medicine was asked to admit post-op; he was also found to have an 8cm AAA    The following problems are new to me today    Assessment/Plan    Incarcerated RT inguinal hernia  -s/p RT inguinal hernia repair w/ mesh placement and appendectomy via cecal wedge resection 5/15/24 w/ Dr. An  -post op management per surgical service    8 cm AAA  Hx tobacco use (quit 2023)  -seen by Dr. Merida, timing of intervention per vascular service    CAD  HTN  HLD  Hx stroke  -ASA, statin, plavix, lisinopril held on admission    Suspect new Dx cirrhosis  -cirrhotic morphology of the liver on imaging, w/ assoc thrombocytopenia and elevated bili  -opt f/u    Expected Discharge Location and Transportation: home?  Expected Discharge    Expected Discharge Date: 5/20/2024; Expected Discharge Time:      DVT prophylaxis:  Medical and mechanical DVT prophylaxis orders are present.         AM-PAC 6 Clicks Score (PT): 20 (05/16/24 0802)    CODE STATUS:   Code Status and Medical Interventions:   Ordered at: 05/15/24 4121     Level Of Support Discussed With:    Patient     Code Status (Patient has no pulse and is not breathing):    CPR (Attempt to Resuscitate)     Medical Interventions (Patient has pulse or is breathing):    Full Support       Antonio Islas, DO  05/16/24

## 2024-05-16 NOTE — PLAN OF CARE
Goal Outcome Evaluation:  Plan of Care Reviewed With: patient        Progress: no change  Outcome Evaluation: PT eval completed. Patient edcuated about log roll and pressure management, able to demonstrate log roll with cues. Patient with stiffened ambulation due to increased abdominal pain. Patient presents below baseline for functional mobility. Patient demonstrates decreased activity tolerance, deconditioning and reduced dynamic balance. Patient would continue to benefit from skilled PT services to improve dynamic balance with gait, transfers strength, and activity tolerance training in order to build endurance and reduce risk of falls.      Anticipated Discharge Disposition (PT): home with home health

## 2024-05-16 NOTE — PLAN OF CARE
Goal Outcome Evaluation:  Plan of Care Reviewed With: patient        Progress: improving  Outcome Evaluation: Patient resting in bed with no signs and symptoms of distress noted. VSS. RA. NSR. No acute changes and needs met all throughout the shift.

## 2024-05-16 NOTE — CASE MANAGEMENT/SOCIAL WORK
Discharge Planning Assessment  [facility]     Patient Name: Dominik Varma  MRN: [id number]  Today's Date: 5/16/2024    Admit Date: 5/15/2024        Discharge Needs Assessment       Row Name 05/16/24 0945       Living Environment    People in Home spouse    Name(s) of People in Home Anita Varma, spouse [phone]    Current Living Arrangements home    Potentially Unsafe Housing Conditions unable to assess    In the past 12 months has the electric, gas, oil, or water company threatened to shut off services in your home? No    Primary Care Provided by self    Provides Primary Care For no one    Family Caregiver if Needed spouse    Family Caregiver Names Anita Varma, spouse [phone]    Quality of Family Relationships unable to assess    Able to Return to Prior Arrangements yes       Resource/Environmental Concerns    Resource/Environmental Concerns none    Transportation Concerns none       Transportation Needs    In the past 12 months, has lack of transportation kept you from medical appointments or from getting medications? no    In the past 12 months, has lack of transportation kept you from meetings, work, or from getting things needed for daily living? No       Food Insecurity    Within the past 12 months, you worried that your food would run out before you got the money to buy more. Never true    Within the past 12 months, the food you bought just didn't last and you didn't have money to get more. Never true       Transition Planning    Patient/Family Anticipates Transition to home with family    Patient/Family Anticipated Services at Transition none    Transportation Anticipated family or friend will provide       Discharge Needs Assessment    Readmission Within the Last 30 Days no previous admission in last 30 days    Equipment Currently Used at Home none    Concerns to be Addressed no discharge needs identified    Anticipated Changes Related to Illness none    Equipment Needed After Discharge none     Discharge Coordination/Progress With verbal consent, I spoke with Pt and daughter, in room, to discuss discharge plan. Pt is admitted with right inguinal hernia. He lives with spouse in [facility]. There are 4 steps at the entrance of the home. He is independent with med/meal prep, bathing/dressing, shopping and housekeeping. He requires assistance with laundry. His PCP is ANTHONY Rosales. He has [employer] insurance which has Rx coverage. He uses [facility], [facility]. He denies DME/HH/O2. Goal is to return home at discharge. Family will provide transportation. CM will continue to follow hospital course.                   Discharge Plan    No documentation.                 Continued Care and Services - Admitted Since 5/15/2024    No active coordination exists for this encounter.       Expected Discharge Date and Time       Expected Discharge Date Expected Discharge Time    May 17, 2024            Demographic Summary       Row Name 05/16/24 0943       General Information    Arrived From home    Reason for Consult discharge planning    Preferred Language English    General Information Comments PCP Kassidy CRUZ       Contact Information    Permission Granted to Share Info With     Contact Information Obtained for     Contact Information Comments Anita Wood, spouse [phone], Dominik Wood, son [phone], Lazara Varma, daughter [phone]                   Functional Status       Row Name 05/16/24 5458       Functional Status    Usual Activity Tolerance good    Current Activity Tolerance moderate       Functional Status, IADL    Medications independent    Meal Preparation independent    Housekeeping independent    Laundry assistive person    Shopping independent                   Psychosocial    No documentation.                  Abuse/Neglect    No documentation.                  Legal    No documentation.                  Substance Abuse    No documentation.                   Patient Forms    No documentation.                     Katy Gillis RN

## 2024-05-17 LAB
ANION GAP SERPL CALCULATED.3IONS-SCNC: 9 MMOL/L (ref 5–15)
BUN SERPL-MCNC: 16 MG/DL (ref 8–23)
BUN/CREAT SERPL: 21.1 (ref 7–25)
CALCIUM SPEC-SCNC: 8.8 MG/DL (ref 8.6–10.5)
CHLORIDE SERPL-SCNC: 106 MMOL/L (ref 98–107)
CO2 SERPL-SCNC: 24 MMOL/L (ref 22–29)
CREAT SERPL-MCNC: 0.76 MG/DL (ref 0.76–1.27)
CYTO UR: NORMAL
DEPRECATED RDW RBC AUTO: 54.7 FL (ref 37–54)
EGFRCR SERPLBLD CKD-EPI 2021: 92.6 ML/MIN/1.73
ERYTHROCYTE [DISTWIDTH] IN BLOOD BY AUTOMATED COUNT: 16.2 % (ref 12.3–15.4)
GLUCOSE SERPL-MCNC: 94 MG/DL (ref 65–99)
HCT VFR BLD AUTO: 35.9 % (ref 37.5–51)
HGB BLD-MCNC: 11.4 G/DL (ref 13–17.7)
LAB AP CASE REPORT: NORMAL
LAB AP CLINICAL INFORMATION: NORMAL
MCH RBC QN AUTO: 29.2 PG (ref 26.6–33)
MCHC RBC AUTO-ENTMCNC: 31.8 G/DL (ref 31.5–35.7)
MCV RBC AUTO: 92.1 FL (ref 79–97)
PATH REPORT.FINAL DX SPEC: NORMAL
PATH REPORT.GROSS SPEC: NORMAL
PLATELET # BLD AUTO: 124 10*3/MM3 (ref 140–450)
PMV BLD AUTO: 11.5 FL (ref 6–12)
POTASSIUM SERPL-SCNC: 4.3 MMOL/L (ref 3.5–5.2)
RBC # BLD AUTO: 3.9 10*6/MM3 (ref 4.14–5.8)
SODIUM SERPL-SCNC: 139 MMOL/L (ref 136–145)
WBC NRBC COR # BLD AUTO: 8.55 10*3/MM3 (ref 3.4–10.8)

## 2024-05-17 PROCEDURE — 85027 COMPLETE CBC AUTOMATED: CPT | Performed by: INTERNAL MEDICINE

## 2024-05-17 PROCEDURE — 80048 BASIC METABOLIC PNL TOTAL CA: CPT | Performed by: INTERNAL MEDICINE

## 2024-05-17 PROCEDURE — 99232 SBSQ HOSP IP/OBS MODERATE 35: CPT | Performed by: FAMILY MEDICINE

## 2024-05-17 PROCEDURE — 25010000002 HEPARIN (PORCINE) PER 1000 UNITS: Performed by: SURGERY

## 2024-05-17 PROCEDURE — 25010000002 METOCLOPRAMIDE PER 10 MG: Performed by: SURGERY

## 2024-05-17 RX ORDER — ATORVASTATIN CALCIUM 10 MG/1
10 TABLET, FILM COATED ORAL NIGHTLY
Status: DISCONTINUED | OUTPATIENT
Start: 2024-05-17 | End: 2024-05-19 | Stop reason: HOSPADM

## 2024-05-17 RX ORDER — LISINOPRIL 20 MG/1
20 TABLET ORAL DAILY
Status: DISCONTINUED | OUTPATIENT
Start: 2024-05-17 | End: 2024-05-19 | Stop reason: HOSPADM

## 2024-05-17 RX ORDER — HYDRALAZINE HYDROCHLORIDE 20 MG/ML
10 INJECTION INTRAMUSCULAR; INTRAVENOUS EVERY 6 HOURS PRN
Status: DISCONTINUED | OUTPATIENT
Start: 2024-05-17 | End: 2024-05-19 | Stop reason: HOSPADM

## 2024-05-17 RX ORDER — ASPIRIN 81 MG/1
81 TABLET ORAL NIGHTLY
Status: DISCONTINUED | OUTPATIENT
Start: 2024-05-17 | End: 2024-05-19 | Stop reason: HOSPADM

## 2024-05-17 RX ORDER — CLOPIDOGREL BISULFATE 75 MG/1
75 TABLET ORAL DAILY
Status: DISCONTINUED | OUTPATIENT
Start: 2024-05-17 | End: 2024-05-19 | Stop reason: HOSPADM

## 2024-05-17 RX ORDER — MONTELUKAST SODIUM 10 MG/1
10 TABLET ORAL NIGHTLY
Status: DISCONTINUED | OUTPATIENT
Start: 2024-05-17 | End: 2024-05-19 | Stop reason: HOSPADM

## 2024-05-17 RX ORDER — DEXTROSE MONOHYDRATE, SODIUM CHLORIDE, AND POTASSIUM CHLORIDE 50; 1.49; 4.5 G/1000ML; G/1000ML; G/1000ML
50 INJECTION, SOLUTION INTRAVENOUS CONTINUOUS
Status: DISCONTINUED | OUTPATIENT
Start: 2024-05-17 | End: 2024-05-17

## 2024-05-17 RX ADMIN — Medication 10 ML: at 20:27

## 2024-05-17 RX ADMIN — METOCLOPRAMIDE 10 MG: 5 INJECTION, SOLUTION INTRAMUSCULAR; INTRAVENOUS at 21:03

## 2024-05-17 RX ADMIN — LISINOPRIL 20 MG: 20 TABLET ORAL at 15:08

## 2024-05-17 RX ADMIN — ATORVASTATIN CALCIUM 10 MG: 10 TABLET, FILM COATED ORAL at 20:27

## 2024-05-17 RX ADMIN — PANTOPRAZOLE SODIUM 40 MG: 40 TABLET, DELAYED RELEASE ORAL at 05:57

## 2024-05-17 RX ADMIN — METOCLOPRAMIDE 10 MG: 5 INJECTION, SOLUTION INTRAMUSCULAR; INTRAVENOUS at 11:49

## 2024-05-17 RX ADMIN — CLOPIDOGREL BISULFATE 75 MG: 75 TABLET ORAL at 15:08

## 2024-05-17 RX ADMIN — METOCLOPRAMIDE 10 MG: 5 INJECTION, SOLUTION INTRAMUSCULAR; INTRAVENOUS at 15:08

## 2024-05-17 RX ADMIN — OXYCODONE HYDROCHLORIDE AND ACETAMINOPHEN 2 TABLET: 5; 325 TABLET ORAL at 11:49

## 2024-05-17 RX ADMIN — OXYCODONE HYDROCHLORIDE AND ACETAMINOPHEN 2 TABLET: 5; 325 TABLET ORAL at 15:58

## 2024-05-17 RX ADMIN — POTASSIUM CHLORIDE, DEXTROSE MONOHYDRATE AND SODIUM CHLORIDE 50 ML/HR: 150; 5; 450 INJECTION, SOLUTION INTRAVENOUS at 08:33

## 2024-05-17 RX ADMIN — ASPIRIN 81 MG: 81 TABLET, COATED ORAL at 20:27

## 2024-05-17 RX ADMIN — HEPARIN SODIUM 5000 UNITS: 5000 INJECTION INTRAVENOUS; SUBCUTANEOUS at 15:08

## 2024-05-17 RX ADMIN — HEPARIN SODIUM 5000 UNITS: 5000 INJECTION INTRAVENOUS; SUBCUTANEOUS at 21:03

## 2024-05-17 RX ADMIN — MONTELUKAST 10 MG: 10 TABLET, FILM COATED ORAL at 20:27

## 2024-05-17 RX ADMIN — OXYCODONE HYDROCHLORIDE AND ACETAMINOPHEN 2 TABLET: 5; 325 TABLET ORAL at 20:27

## 2024-05-17 RX ADMIN — DOCUSATE SODIUM 100 MG: 100 CAPSULE, LIQUID FILLED ORAL at 08:28

## 2024-05-17 RX ADMIN — HEPARIN SODIUM 5000 UNITS: 5000 INJECTION INTRAVENOUS; SUBCUTANEOUS at 05:57

## 2024-05-17 RX ADMIN — BISACODYL 10 MG: 5 TABLET, COATED ORAL at 08:28

## 2024-05-17 RX ADMIN — METOCLOPRAMIDE 10 MG: 5 INJECTION, SOLUTION INTRAMUSCULAR; INTRAVENOUS at 04:33

## 2024-05-17 NOTE — DISCHARGE PLACEMENT REQUEST
"Alisa Manning (78 y.o. Male)    To: [facility]   From Katy Gillis RN Case Mgr   Ph [phone]       Date of Birth   1945    Social Security Number       Address   [address]  [facility] [address]    Home Phone   [phone]    MRN   [id number]       Quaker   Judaism    Marital Status                               Admission Date   5/15/24    Admission Type   Emergency    Admitting Provider   LEE Feliciano DO    Attending Provider   LEE Feliciano DO    Department, Room/Bed   [facility] 6B, N627/1       Discharge Date       Discharge Disposition       Discharge Destination                                 Attending Provider: LEE Feliciano DO    Allergies: No Known Allergies    Isolation: None   Infection: None   Code Status: CPR    Ht: 182.9 cm (72\")   Wt: 77.1 kg (170 lb)    Admission Cmt: None   Principal Problem: Incarcerated right inguinal hernia [K40.30]                   Active Insurance as of 5/15/2024       Primary Coverage       Payor Plan Insurance Group Employer/Plan Group    [employer] ANTHEM BLUE CROSS BLUE SHIELD PPO [id number]       Payor Plan Address Payor Plan Phone Number Payor Plan Fax Number Effective Dates    [address] [phone]  4/1/2024 - None Entered    [facility] [id number]         Subscriber Name Subscriber Birth Date Member ID       ALISA MANNING 1945 [id number]                     Emergency Contacts        (Rel.) Home Phone Work Phone Mobile Phone    Anita Manning (Spouse) [phone] -- [phone]    Alisa Manning,II (Son) [phone] -- [phone]    [facility] (Daughter) -- -- [phone]              Insurance Information                  ANTHEM BLUE CROSS/ANTHEM BLUE CROSS BLUE SHIELD PPO Phone: [phone]    Subscriber: Alisa Manning Subscriber#: [id number]    Group#: [id number] Precert#: [id number]             History & Physical        Mini, MD Monique at 05/15/24 1747        "         [facility] Medicine Services  HISTORY AND PHYSICAL    Patient Name: Dominik Varma  : 1945  MRN: [id number]  Primary Care Physician: Kassidy Bolton PA-C  Date of admission: 5/15/2024      Subjective   Subjective     Chief Complaint:  R groin pain     HPI:  Dominik Varma is a 78 y.o. male with history of CAD, HTN HL, CVA (no residual deficits) and a right inguinal hernia reparied about ten years ago.   He presented to the ED earlier today with acute R groin pain.  Imaging showed an incarcerated inguinal hernia, and an incidental AAA of 8cm.      He was taken to OR by Dr An who did a laparascopic repair and found the appendix incarcerated in the inguinal hernia, resulting in resection of cecum.  The patient is now in PACU and will be admitted to hospitalist service.  He denies nausea.  Pain is controlled.        Personal History     Past Medical History:   Diagnosis Date    Cerumen impaction     Hyperlipidemia     Hypertension     Impaired fasting glucose     Inguinal hernia, right     Occlusion and stenosis of unspecified carotid artery     with infarct           Past Surgical History:   Procedure Laterality Date    CARDIAC CATHETERIZATION N/A 10/17/2017    Procedure: Left Heart Cath;  Surgeon: Zach Phoenix MD;  Location: [facility] CATH INVASIVE LOCATION;  Service:     CEREBRAL ANGIOGRAM      HERNIA REPAIR         Family History: family history includes Cancer in his mother; Heart disease in his father; Hypertension in his father.     Social History:  reports that he quit smoking about 6 years ago. His smoking use included cigarettes. He started smoking about 47 years ago. He has a 40.7 pack-year smoking history. He has been exposed to tobacco smoke. He has never used smokeless tobacco. He reports that he does not drink alcohol and does not use drugs.  Social History     Social History Narrative    Not on file       Medications:  Available home medication  information reviewed.  aspirin, atorvastatin, clopidogrel, esomeprazole, lisinopril, and montelukast    No Known Allergies    Objective   Objective     Vital Signs:   Temp:  [97 °F (36.1 °C)-98.3 °F (36.8 °C)] 98 °F (36.7 °C)  Heart Rate:  [64-73] 64  Resp:  [16-18] 16  BP: (120-145)/(65-89) 139/74  Flow (L/min):  [2] 2       Physical Exam   Gen:  WD/WN man seen in PACU, in mild pain, alert and conversant   Neuro: alert and oriented, clear speech, follows commands, grossly nonfocal  HEENT:  NC/AT   Neck:  Supple, no LAD  Heart RRR no murmur, rub, or gallop  Lungs, anterior:  CTA, not labored.   Abd:  Soft, approp tender.  Laparoscopic incisions dressed.   Extrem:  No c/c/e  Skin warm and dry       Result Review:  I have personally reviewed the results from the time of this admission to 5/15/2024 17:48 EDT and agree with these findings:  [x]  Laboratory list / accordion  []  Microbiology  [x]  Radiology  []  EKG/Telemetry   []  Cardiology/Vascular   []  Pathology  []  Old records  []  Other:  Most notable findings include: hgb 12.4  Cr 1.0 .  AAA 8cm.   Plts 126       LAB RESULTS:      Lab 05/15/24  1131 05/15/24  1107   WBC  --  10.14   HEMOGLOBIN  --  12.4*   HEMATOCRIT  --  39.6   PLATELETS  --  126*   NEUTROS ABS  --  7.69*   IMMATURE GRANS (ABS)  --  0.05   LYMPHS ABS  --  1.14   MONOS ABS  --  0.98*   EOS ABS  --  0.25   MCV  --  93.4   PROTIME 15.4*  --    INR 1.20*  --          Lab 05/15/24  1107   SODIUM 138   POTASSIUM 4.4   CHLORIDE 104   CO2 25.0   ANION GAP 9.0   BUN 18   CREATININE 1.06   EGFR 72.3   GLUCOSE 107*   CALCIUM 9.6         Lab 05/15/24  1107   TOTAL PROTEIN 6.9   ALBUMIN 3.9   GLOBULIN 3.0   ALT (SGPT) 12   AST (SGOT) 16   BILIRUBIN 2.8*   ALK PHOS 85                     UA          5/15/2024    11:07   Urinalysis   Specific [place], UA 1.012    Ketones, UA Negative    Blood, UA Negative    Leukocytes, UA Negative    Nitrite, UA Negative        Microbiology Results (last 10 days)       ** No  results found for the last 240 hours. **            Peripheral Block    Result Date: 5/15/2024  Cristopher Castro, TOMASA     5/15/2024  3:03 PM Peripheral Block Patient reassessed immediately prior to procedure Patient location during procedure: OR Start time: 5/15/2024 2:42 PM Reason for block: at surgeon's request and post-op pain management Performed by CRNA/CAA: Regulo Nguyen CRNASRNA: Cristopher Castro, TOMASA Preanesthetic Checklist Completed: patient identified, IV checked, site marked, risks and benefits discussed, surgical consent, monitors and equipment checked, pre-op evaluation and timeout performed Prep: Pt Position: supine Sterile barriers:cap, gloves, mask and washed/disinfected hands Prep: ChloraPrep Patient monitoring: blood pressure monitoring, continuous pulse oximetry and EKG Procedure Sedation: yes Performed under: general Guidance:ultrasound guided Images:still images obtained, printed/placed on chart Laterality:Bilateral Block Type:TAP Injection Technique:single-shot Needle Type:echogenic Needle Gauge:20 G Resistance on Injection: none Medications Used: bupivacaine PF (MARCAINE) 0.25 % injection - Injection  60 mL - 5/15/2024 2:42:00 PM dexamethasone sodium phosphate injection - Injection  4 mg - 5/15/2024 2:42:00 PM Medications Comment:Block Injection:  LA dose divided between Right and Left block Post Assessment Injection Assessment: negative aspiration for heme, incremental injection and no paresthesia on injection Patient Tolerance:comfortable throughout block Complications:no Additional Notes Mid-Axillary/Lateral TAPs A high-frequency linear transducer, with sterile cover, was placed in the midaxillary line between the ASIS and costal margin. The External Oblique Muscle (EOM), Internal Oblique Muscle (IOM), Transverse Abdominus Muscle (CABRERA), and Peritoneum were identified. The insertion site was prepped in sterile fashion and then localized with 2-5 ml of 1% Lidocaine.  "Using ultrasound-guidance, a 20-gauge B-Munoz 4\" Ultraplex 360 non-stimulating echogenic needle was advanced in plane, from medial to lateral, until the tip of the needle was in the fascial plane between the IOM and CABRERA. 1-3ml of preservative free normal saline was used to hydro-dissect the fascial planes. After the fascial plane was verified, the local anesthetic (LA) was injected. The procedure was repeated on the opposite side for bilateral coverage. Aspiration every 5 ml to prevent intravascular injection. Injection was completed with negative aspiration of blood and negative intravascular injection. Injection pressures were normal with minimal resistance. Midaxillary TAPs should reach intercostal nerves T10- T11 and the subcostal nerve T12.      CT Abdomen Pelvis Without Contrast    Result Date: 5/15/2024  EXAM: CT ABDOMEN PELVIS WO CONTRAST-  DATE OF EXAM: 5/15/2024 10:23 AM  INDICATION: Right inguinal pain..  COMPARISON: None available  TECHNIQUE: Contiguous axial CT images were obtained from the lung bases to the pubic symphysis without contrast. Sagittal and coronal reconstructions were performed.  Automated exposure control and iterative reconstruction methods were used.  FINDINGS: The lack of intravenous contrast limits the evaluation of visceral and vascular structures.  The heart size is normal. There is trace left-sided pleural effusion. There is linear bandlike atelectasis within the left lower lobe.  There is cirrhotic liver morphology. There is no focal liver lesion identified by noncontrast technique. The gallbladder is present without wall thickening. There is no intrahepatic or extrahepatic biliary ductal dilatation. The spleen measures 11.7 cm in craniocaudal dimension. The adrenal glands appear within normal limits. There is fatty interdigitation of the pancreas. There is no pancreatic ductal dilatation.  The kidneys are symmetric in size. There is no hydronephrosis. There is an exophytic 3.2 " cm cyst arising from the lateral aspect of the left kidney. There is no urolithiasis. The urinary bladder is fluid-filled without wall thickening. The prostate is normal in size with central calcification.  The stomach and duodenum are normal in caliber and configuration. There are no abnormally dilated loops of small bowel to suggest small bowel obstruction or small bowel inflammation. There is a right inguinal hernia which contains the base of the cecum and the suspected appendix. There is some mild inflammatory stranding surrounding the hernia sac as well as fluid tracking into the right inguinal canal. There is a small right hydrocele. The inflammation of the hernia sac is more likely related to the hernia being strangulated or incarcerated rather than a primary appendicitis within the hernia. There is a moderate colonic stool burden. There is no free air.  There is a large infrarenal saccular aneurysm arising from the left anterior aspect of the aorta. There is no surrounding blood products to suggest acute rupture. This measures up to 7.7 cm in maximal dimension. There is no mesenteric, retroperitoneal, or pelvic lymphadenopathy by size criteria. There is degenerative disc disease at L2-L3.      Impression: 1. Large infrarenal saccular type abdominal aortic aneurysm measuring up to 7.7 cm. Vascular surgery consultation is recommended given the size. No evidence of surrounding blood products to suggest acute rupture at this time. 2. Right inguinal hernia containing portions of the base of the cecum and appendix. There is inflammatory stranding within the hernia sac and adjacent surrounding fluid. This is more concerning for a strangulated or incarcerated right inguinal hernia rather than a primary appendicitis within the hernia sac. 3. Cirrhotic liver morphology.  Findings called to Wiliam Flor at 10:55 a.m. on 5/15/2024      This report was finalized on 5/15/2024 11:04 AM by David Velez MD.            Assessment & Plan   Assessment & Plan       * No active hospital problems. *    78 yr old man with CAD HL HTN, admitted for incarcerated R inguinal hernia and incidentally found to have large AAA.     R inguinal hernia, incarcerated  - OR repair on 5/15 (Dr An)  - Cecal wedge resection due to entrapped appendix  - empiric abx for now:  reconsider based on course   - diet deferred to surgeon.  NPO for now. Await bowel recovery.    - postop care, pain control    AAA 8cm  - Dr Merida aware.  May repair it this hospital stay via EVAR.    - holding home meds (ASA Plavix) for now. Pt npo.     HTN HL CAD  - last took ASA/Plavix on 5/15 AM   - will hold for now, awaiting surgical plans   - also holding ACEI and statin due to normotensive and NPO.     Incidental finding:  cirrhotic morphology of liver  - outpt followup  - no ETOH intake   - plts 126     History of tobacco  - Quit last year.  Denies dyspnea or COPD     DVT prophylaxis:  Mechanical and medical DVT prophylaxis orders are signed and held.           CODE STATUS:    There are no questions and answers to display.       Expected Discharge   Expected discharge date/ time has not been documented.     Monique Henry MD  05/15/24      Electronically signed by Monique Henry MD at 05/15/24 3967       Current Facility-Administered Medications   Medication Dose Route Frequency Provider Last Rate Last Admin    aspirin EC tablet 81 mg  81 mg Oral Nightly LEE Feliciano, DO        atorvastatin (LIPITOR) tablet 10 mg  10 mg Oral Nightly LEE Feliciano, DO        bisacodyl (DULCOLAX) EC tablet 10 mg  10 mg Oral Daily Raul An MD   10 mg at 05/17/24 0828    clopidogrel (PLAVIX) tablet 75 mg  75 mg Oral Daily LEE Fleiciano, DO        dextrose 5 % and sodium chloride 0.45 % with KCl 20 mEq/L infusion  50 mL/hr Intravenous Continuous Raul nA MD 50 mL/hr at 05/17/24 0833 50 mL/hr at 05/17/24 0833    docusate sodium (COLACE) capsule 100 mg  100 mg  Oral BID Raul An MD   100 mg at 24 0828    heparin (porcine) 5000 UNIT/ML injection 5,000 Units  5,000 Units Subcutaneous Q8H Raul An MD   5,000 Units at 24 0557    hydrALAZINE (APRESOLINE) injection 10 mg  10 mg Intravenous Q6H PRN LEE Feliciano DO        lactated ringers infusion  9 mL/hr Intravenous Continuous PRN Raul An MD        lisinopril (PRINIVIL,ZESTRIL) tablet 20 mg  20 mg Oral Daily LEE Feliciano DO        metoclopramide (REGLAN) injection 10 mg  10 mg Intravenous Q6H Raul An MD   10 mg at 24 1149    montelukast (SINGULAIR) tablet 10 mg  10 mg Oral Nightly LEE Feliciano DO        morphine injection 2 mg  2 mg Intravenous Q2H PRN Raul An MD        And    naloxone (NARCAN) injection 0.4 mg  0.4 mg Intravenous Q5 Min PRN Raul An MD        nitroglycerin (NITROSTAT) SL tablet 0.4 mg  0.4 mg Sublingual Q5 Min PRN Monique Henry MD        ondansetron ODT (ZOFRAN-ODT) disintegrating tablet 4 mg  4 mg Oral Q6H PRN Raul An MD        Or    ondansetron (ZOFRAN) injection 4 mg  4 mg Intravenous Q6H PRN Raul An MD        oxyCODONE-acetaminophen (PERCOCET) 5-325 MG per tablet 2 tablet  2 tablet Oral Q4H PRN Raul An MD   2 tablet at 24 1149    pantoprazole (PROTONIX) EC tablet 40 mg  40 mg Oral Q AM Raul An MD   40 mg at 24 0557    sodium chloride 0.9 % flush 10 mL  10 mL Intravenous PRN Raul An MD   10 mL at 24 2105        Physician Progress Notes (most recent note)        LEE Feliciano DO at 24 0810              [facility] Medicine Services  PROGRESS NOTE    Patient Name: Dominik Varma  : 1945  MRN: [id number]    Date of Admission: 5/15/2024  Primary Care Physician: Kassidy Bolton PA-C    Subjective   Subjective     CC:  F/u hernia, AAA    HPI:  Patient is a 77 yo M seen and examined by me this AM, patient  recently seen by surgeon this AM, continuing on CLD for now. Patient has had some flatulence but still no BM as of yet. Encouraged continued ambulation and will follow.       Objective   Objective     Vital Signs:   Temp:  [98 °F (36.7 °C)-98.5 °F (36.9 °C)] 98 °F (36.7 °C)  Heart Rate:  [58-97] 72  Resp:  [16-18] 16  BP: (102-191)/(70-97) 191/97     Physical Exam:  Constitutional: Awake, alert, standing up in room and then sitting on side of bed. On RA Frail appearing   HEENT: NCAT/EOMI, nares patent, MMM   Respiratory: Clear to auscultation bilaterally, no rhonchi or wheezing, respiratory effort normal   Cardiovascular: RRR, S1S2+   Gastrointestinal: Abd soft w/ gaseous distention, BS present, nontender to palpation  Psychiatric: Appropriate affect, cooperative  Neurologic: No obvious focal deficits, speech clear and fluent    Results Reviewed:  LAB RESULTS:      Lab 05/17/24  0542 05/16/24  0526 05/15/24  1131 05/15/24  1107   WBC 8.55 10.17  --  10.14   HEMOGLOBIN 11.4* 11.5*  --  12.4*   HEMATOCRIT 35.9* 36.2*  --  39.6   PLATELETS 124* 117*  --  126*   NEUTROS ABS  --  8.75*  --  7.69*   IMMATURE GRANS (ABS)  --  0.06*  --  0.05   LYMPHS ABS  --  0.50*  --  1.14   MONOS ABS  --  0.85  --  0.98*   EOS ABS  --  0.00  --  0.25   MCV 92.1 92.1  --  93.4   PROTIME  --   --  15.4*  --          Lab 05/17/24  0542 05/16/24  0526 05/15/24  1107   SODIUM 139 135* 138   POTASSIUM 4.3 4.7 4.4   CHLORIDE 106 101 104   CO2 24.0 21.0* 25.0   ANION GAP 9.0 13.0 9.0   BUN 16 17 18   CREATININE 0.76 0.83 1.06   EGFR 92.6 90.1 72.3   GLUCOSE 94 154* 107*   CALCIUM 8.8 9.0 9.6         Lab 05/16/24  0526 05/15/24  1107   TOTAL PROTEIN 6.7 6.9   ALBUMIN 3.7 3.9   GLOBULIN 3.0 3.0   ALT (SGPT) 10 12   AST (SGOT) 14 16   BILIRUBIN 2.0* 2.8*   ALK PHOS 78 85         Lab 05/15/24  1131   PROTIME 15.4*   INR 1.20*                 Brief Urine Lab Results  (Last result in the past 365 days)        Color   Clarity   Blood   Leuk Est    Nitrite   Protein   CREAT   Urine HCG        05/15/24 1107 Yellow   Clear   Negative   Negative   Negative   Negative                   Microbiology Results Abnormal       None            Peripheral Block    Result Date: 5/15/2024  Cristopher Castro, TOMASA     5/15/2024  3:03 PM Peripheral Block Patient reassessed immediately prior to procedure Patient location during procedure: OR Start time: 5/15/2024 2:42 PM Reason for block: at surgeon's request and post-op pain management Performed by CRNA/CAA: Regulo Nguyen CRNASRNA: Cristopher Castro SRNA Preanesthetic Checklist Completed: patient identified, IV checked, site marked, risks and benefits discussed, surgical consent, monitors and equipment checked, pre-op evaluation and timeout performed Prep: Pt Position: supine Sterile barriers:cap, gloves, mask and washed/disinfected hands Prep: ChloraPrep Patient monitoring: blood pressure monitoring, continuous pulse oximetry and EKG Procedure Sedation: yes Performed under: general Guidance:ultrasound guided Images:still images obtained, printed/placed on chart Laterality:Bilateral Block Type:TAP Injection Technique:single-shot Needle Type:echogenic Needle Gauge:20 G Resistance on Injection: none Medications Used: bupivacaine PF (MARCAINE) 0.25 % injection - Injection  60 mL - 5/15/2024 2:42:00 PM dexamethasone sodium phosphate injection - Injection  4 mg - 5/15/2024 2:42:00 PM Medications Comment:Block Injection:  LA dose divided between Right and Left block Post Assessment Injection Assessment: negative aspiration for heme, incremental injection and no paresthesia on injection Patient Tolerance:comfortable throughout block Complications:no Additional Notes Mid-Axillary/Lateral TAPs A high-frequency linear transducer, with sterile cover, was placed in the midaxillary line between the ASIS and costal margin. The External Oblique Muscle (EOM), Internal Oblique Muscle (IOM), Transverse Abdominus Muscle  "(CABRERA), and Peritoneum were identified. The insertion site was prepped in sterile fashion and then localized with 2-5 ml of 1% Lidocaine. Using ultrasound-guidance, a 20-gauge B-Munoz 4\" Ultraplex 360 non-stimulating echogenic needle was advanced in plane, from medial to lateral, until the tip of the needle was in the fascial plane between the IOM and CABRERA. 1-3ml of preservative free normal saline was used to hydro-dissect the fascial planes. After the fascial plane was verified, the local anesthetic (LA) was injected. The procedure was repeated on the opposite side for bilateral coverage. Aspiration every 5 ml to prevent intravascular injection. Injection was completed with negative aspiration of blood and negative intravascular injection. Injection pressures were normal with minimal resistance. Midaxillary TAPs should reach intercostal nerves T10- T11 and the subcostal nerve T12.           Current medications:  Scheduled Meds:aspirin, 81 mg, Oral, Nightly  atorvastatin, 10 mg, Oral, Nightly  bisacodyl, 10 mg, Oral, Daily  clopidogrel, 75 mg, Oral, Daily  docusate sodium, 100 mg, Oral, BID  heparin (porcine), 5,000 Units, Subcutaneous, Q8H  lisinopril, 20 mg, Oral, Daily  metoclopramide, 10 mg, Intravenous, Q6H  montelukast, 10 mg, Oral, Nightly  pantoprazole, 40 mg, Oral, Q AM      Continuous Infusions:dextrose 5 % and sodium chloride 0.45 % with KCl 20 mEq/L, 50 mL/hr, Last Rate: 50 mL/hr (05/17/24 0833)  lactated ringers, 9 mL/hr      PRN Meds:.  hydrALAZINE    lactated ringers    Morphine **AND** naloxone    nitroglycerin    ondansetron ODT **OR** ondansetron    oxyCODONE-acetaminophen    [COMPLETED] Insert Peripheral IV **AND** sodium chloride    Assessment & Plan   Assessment & Plan     Active Hospital Problems    Diagnosis  POA    **Incarcerated right inguinal hernia [K40.30]  Yes      Resolved Hospital Problems   No resolved problems to display.        Brief Hospital Course to date:  Dominik Varma is a 78 " y.o. male w/ CAD, HTN, HLD, prior stroke, remote RT inguinal hernia repair, who presented w/ 2 days RT groin pain, was found to have incarcerated hernia and was taken emergently to surgery; hospital medicine was asked to admit post-op; he was also found to have an 8cm AAA. Patient transferred to my services on the AM of 5/17, reviewed vascular recommendations, plans for follow up with Dr. Merida on 5/28 at 10:30 AM for scheduling of future AAA repair. Continuing to follow with Dr. An, continuing on liquid diet for now, awaiting return of bowel function at this time.     Assessment/Plan    Incarcerated RT inguinal hernia  -s/p RT inguinal hernia repair w/ mesh placement and appendectomy via cecal wedge resection 5/15/24 w/ Dr. An  -post op management per surgical service  - Patient this AM remains on CLD, + flatulence per report but no BM as of yet   - Continued observation and follow with Dr. An     8 cm AAA  Hx tobacco use (quit 2023)  -seen by Dr. Merida, plans now for likely outpatient follow up on 5/28 with Dr. Merida at 10:30 AM     CAD  HTN  HLD  Hx stroke  -ASA, statin, plavix, lisinopril will be restarted on 5/17     Suspect new Dx cirrhosis  -cirrhotic morphology of the liver on imaging, w/ assoc thrombocytopenia and elevated bili  -opt f/u    Expected Discharge Location and Transportation: home  Expected Discharge   Expected Discharge Date: 5/18/2024; Expected Discharge Time:      DVT prophylaxis:  Medical and mechanical DVT prophylaxis orders are present.         AM-PAC 6 Clicks Score (PT): 18 (05/17/24 0832)    CODE STATUS:   Code Status and Medical Interventions:   Ordered at: 05/15/24 4198     Level Of Support Discussed With:    Patient     Code Status (Patient has no pulse and is not breathing):    CPR (Attempt to Resuscitate)     Medical Interventions (Patient has pulse or is breathing):    Full Support       ALLISON Feliciano DO  05/17/24        Electronically signed by LEE Feliciano DO  at 05/17/24 1221          Physical Therapy Notes (most recent note)        Stacey Hudson PT at 05/16/24 1015  Version 1 of 1         Goal Outcome Evaluation:  Plan of Care Reviewed With: patient        Progress: no change  Outcome Evaluation: PT eval completed. Patient edcuated about log roll and pressure management, able to demonstrate log roll with cues. Patient with stiffened ambulation due to increased abdominal pain. Patient presents below baseline for functional mobility. Patient demonstrates decreased activity tolerance, deconditioning and reduced dynamic balance. Patient would continue to benefit from skilled PT services to improve dynamic balance with gait, transfers strength, and activity tolerance training in order to build endurance and reduce risk of falls.      Anticipated Discharge Disposition (PT): home with home health                          Electronically signed by Stacey Hudson PT at 05/16/24 1517       Occupational Therapy Notes (most recent note)    No notes exist for this encounter.

## 2024-05-17 NOTE — CASE MANAGEMENT/SOCIAL WORK
Continued Stay Note  [facility]     Patient Name: Dominik Varma  MRN: [id number]  Today's Date: 5/17/2024    Admit Date: 5/15/2024    Plan: discharge plan   Discharge Plan       Row Name 05/17/24 1343       Plan    Plan discharge plan    Plan Comments Pt having flatulence but no BM yet. Pt on room air. Per Pt request, referral for  PT faxed to Cathie @ [facility] @ [phone]. CM will continue to follow.    Final Discharge Disposition Code 06 - home with home health care                   Discharge Codes    No documentation.                 Expected Discharge Date and Time       Expected Discharge Date Expected Discharge Time    May 18, 2024               Katy Gillis RN

## 2024-05-17 NOTE — PROGRESS NOTES
[facility] Medicine Services  PROGRESS NOTE    Patient Name: Dominik Varma  : 1945  MRN: [id number]    Date of Admission: 5/15/2024  Primary Care Physician: Kassidy Bolton PA-C    Subjective   Subjective     CC:  F/u hernia, AAA    HPI:  Patient is a 77 yo M seen and examined by me this AM, patient recently seen by surgeon this AM, continuing on CLD for now. Patient has had some flatulence but still no BM as of yet. Encouraged continued ambulation and will follow.       Objective   Objective     Vital Signs:   Temp:  [98 °F (36.7 °C)-98.5 °F (36.9 °C)] 98 °F (36.7 °C)  Heart Rate:  [58-97] 72  Resp:  [16-18] 16  BP: (102-191)/(70-97) 191/97     Physical Exam:  Constitutional: Awake, alert, standing up in room and then sitting on side of bed. On RA Frail appearing   HEENT: NCAT/EOMI, nares patent, MMM   Respiratory: Clear to auscultation bilaterally, no rhonchi or wheezing, respiratory effort normal   Cardiovascular: RRR, S1S2+   Gastrointestinal: Abd soft w/ gaseous distention, BS present, nontender to palpation  Psychiatric: Appropriate affect, cooperative  Neurologic: No obvious focal deficits, speech clear and fluent    Results Reviewed:  LAB RESULTS:      Lab 24  0542 24  0526 05/15/24  1131 05/15/24  1107   WBC 8.55 10.17  --  10.14   HEMOGLOBIN 11.4* 11.5*  --  12.4*   HEMATOCRIT 35.9* 36.2*  --  39.6   PLATELETS 124* 117*  --  126*   NEUTROS ABS  --  8.75*  --  7.69*   IMMATURE GRANS (ABS)  --  0.06*  --  0.05   LYMPHS ABS  --  0.50*  --  1.14   MONOS ABS  --  0.85  --  0.98*   EOS ABS  --  0.00  --  0.25   MCV 92.1 92.1  --  93.4   PROTIME  --   --  15.4*  --          Lab 24  0542 24  0526 05/15/24  1107   SODIUM 139 135* 138   POTASSIUM 4.3 4.7 4.4   CHLORIDE 106 101 104   CO2 24.0 21.0* 25.0   ANION GAP 9.0 13.0 9.0   BUN 16 17 18   CREATININE 0.76 0.83 1.06   EGFR 92.6 90.1 72.3   GLUCOSE 94 154* 107*   CALCIUM 8.8 9.0 9.6         Lab  05/16/24  0526 05/15/24  1107   TOTAL PROTEIN 6.7 6.9   ALBUMIN 3.7 3.9   GLOBULIN 3.0 3.0   ALT (SGPT) 10 12   AST (SGOT) 14 16   BILIRUBIN 2.0* 2.8*   ALK PHOS 78 85         Lab 05/15/24  1131   PROTIME 15.4*   INR 1.20*                 Brief Urine Lab Results  (Last result in the past 365 days)        Color   Clarity   Blood   Leuk Est   Nitrite   Protein   CREAT   Urine HCG        05/15/24 1107 Yellow   Clear   Negative   Negative   Negative   Negative                   Microbiology Results Abnormal       None            Peripheral Block    Result Date: 5/15/2024  Cristopher Castro, SRNA     5/15/2024  3:03 PM Peripheral Block Patient reassessed immediately prior to procedure Patient location during procedure: OR Start time: 5/15/2024 2:42 PM Reason for block: at surgeon's request and post-op pain management Performed by CRNA/CAA: Regulo Nguyen, CRNASRNA: Cristopher Castro, TOMASA Preanesthetic Checklist Completed: patient identified, IV checked, site marked, risks and benefits discussed, surgical consent, monitors and equipment checked, pre-op evaluation and timeout performed Prep: Pt Position: supine Sterile barriers:cap, gloves, mask and washed/disinfected hands Prep: ChloraPrep Patient monitoring: blood pressure monitoring, continuous pulse oximetry and EKG Procedure Sedation: yes Performed under: general Guidance:ultrasound guided Images:still images obtained, printed/placed on chart Laterality:Bilateral Block Type:TAP Injection Technique:single-shot Needle Type:echogenic Needle Gauge:20 G Resistance on Injection: none Medications Used: bupivacaine PF (MARCAINE) 0.25 % injection - Injection  60 mL - 5/15/2024 2:42:00 PM dexamethasone sodium phosphate injection - Injection  4 mg - 5/15/2024 2:42:00 PM Medications Comment:Block Injection:  LA dose divided between Right and Left block Post Assessment Injection Assessment: negative aspiration for heme, incremental injection and no paresthesia on  "injection Patient Tolerance:comfortable throughout block Complications:no Additional Notes Mid-Axillary/Lateral TAPs A high-frequency linear transducer, with sterile cover, was placed in the midaxillary line between the ASIS and costal margin. The External Oblique Muscle (EOM), Internal Oblique Muscle (IOM), Transverse Abdominus Muscle (CABRERA), and Peritoneum were identified. The insertion site was prepped in sterile fashion and then localized with 2-5 ml of 1% Lidocaine. Using ultrasound-guidance, a 20-gauge B-Munoz 4\" Ultraplex 360 non-stimulating echogenic needle was advanced in plane, from medial to lateral, until the tip of the needle was in the fascial plane between the IOM and CABRERA. 1-3ml of preservative free normal saline was used to hydro-dissect the fascial planes. After the fascial plane was verified, the local anesthetic (LA) was injected. The procedure was repeated on the opposite side for bilateral coverage. Aspiration every 5 ml to prevent intravascular injection. Injection was completed with negative aspiration of blood and negative intravascular injection. Injection pressures were normal with minimal resistance. Midaxillary TAPs should reach intercostal nerves T10- T11 and the subcostal nerve T12.           Current medications:  Scheduled Meds:aspirin, 81 mg, Oral, Nightly  atorvastatin, 10 mg, Oral, Nightly  bisacodyl, 10 mg, Oral, Daily  clopidogrel, 75 mg, Oral, Daily  docusate sodium, 100 mg, Oral, BID  heparin (porcine), 5,000 Units, Subcutaneous, Q8H  lisinopril, 20 mg, Oral, Daily  metoclopramide, 10 mg, Intravenous, Q6H  montelukast, 10 mg, Oral, Nightly  pantoprazole, 40 mg, Oral, Q AM      Continuous Infusions:dextrose 5 % and sodium chloride 0.45 % with KCl 20 mEq/L, 50 mL/hr, Last Rate: 50 mL/hr (05/17/24 0833)  lactated ringers, 9 mL/hr      PRN Meds:.  hydrALAZINE    lactated ringers    Morphine **AND** naloxone    nitroglycerin    ondansetron ODT **OR** ondansetron    " oxyCODONE-acetaminophen    [COMPLETED] Insert Peripheral IV **AND** sodium chloride    Assessment & Plan   Assessment & Plan     Active Hospital Problems    Diagnosis  POA    **Incarcerated right inguinal hernia [K40.30]  Yes      Resolved Hospital Problems   No resolved problems to display.        Brief Hospital Course to date:  Dominik Varma is a 78 y.o. male w/ CAD, HTN, HLD, prior stroke, remote RT inguinal hernia repair, who presented w/ 2 days RT groin pain, was found to have incarcerated hernia and was taken emergently to surgery; hospital medicine was asked to admit post-op; he was also found to have an 8cm AAA. Patient transferred to my services on the AM of 5/17, reviewed vascular recommendations, plans for follow up with Dr. Merida on 5/28 at 10:30 AM for scheduling of future AAA repair. Continuing to follow with Dr. An, continuing on liquid diet for now, awaiting return of bowel function at this time.     Assessment/Plan    Incarcerated RT inguinal hernia  -s/p RT inguinal hernia repair w/ mesh placement and appendectomy via cecal wedge resection 5/15/24 w/ Dr. An  -post op management per surgical service  - Patient this AM remains on CLD, + flatulence per report but no BM as of yet   - Continued observation and follow with Dr. An     8 cm AAA  Hx tobacco use (quit 2023)  -seen by Dr. Merida, plans now for likely outpatient follow up on 5/28 with Dr. Merida at 10:30 AM     CAD  HTN  HLD  Hx stroke  -ASA, statin, plavix, lisinopril will be restarted on 5/17     Suspect new Dx cirrhosis  -cirrhotic morphology of the liver on imaging, w/ assoc thrombocytopenia and elevated bili  -opt f/u    Expected Discharge Location and Transportation: home  Expected Discharge   Expected Discharge Date: 5/18/2024; Expected Discharge Time:      DVT prophylaxis:  Medical and mechanical DVT prophylaxis orders are present.         AM-PAC 6 Clicks Score (PT): 18 (05/17/24 0832)    CODE STATUS:   Code Status and  Medical Interventions:   Ordered at: 05/15/24 5433     Level Of Support Discussed With:    Patient     Code Status (Patient has no pulse and is not breathing):    CPR (Attempt to Resuscitate)     Medical Interventions (Patient has pulse or is breathing):    Full Support       ALLISON Feliciano, DO  05/17/24

## 2024-05-17 NOTE — PLAN OF CARE
"Goal Outcome Evaluation:  Plan of Care Reviewed With: patient        Progress: improving  Outcome Evaluation: Slept most of the night. SR on tele. No c/o CP or SOA. No c/o N/V; Pt stated he is \"passing a lot of gas\". Lap site x3 CDI. VSS. POC Ongoing                               "

## 2024-05-17 NOTE — PROGRESS NOTES
"Patient Name:  Dominik Varma  YOB: 1945  [id number]    Surgery Progress Note    Date of visit: 5/17/2024      Subjective: No acute events.  Was quite distended yesterday afternoon.  Reports that he passed flatus on multiple occasions overnight and that bloating is much better this morning.  Has not had a bowel movement.  No nausea or vomiting.  Tolerated small amounts of clear liquids yesterday without difficulty.  Ambulated.  Pain is overall improving          Objective:     /87 (BP Location: Left arm, Patient Position: Lying)   Pulse 63   Temp 98.3 °F (36.8 °C) (Axillary)   Resp 16   Ht 182.9 cm (72\")   Wt 77.1 kg (170 lb)   SpO2 93%   BMI 23.06 kg/m²     Intake/Output Summary (Last 24 hours) at 5/17/2024 0758  Last data filed at 5/17/2024 0559  Gross per 24 hour   Intake 2119 ml   Output 3200 ml   Net -1081 ml       GEN:   Awake, alert, in no acute distress, resting comfortably in bed   CV:   Regular rate and rhythm  L:  Symmetric expansion, not labored on room air  Abd:  Soft, he remains moderately distended although less than yesterday afternoon, appropriately tender palpation along incisions, incisions with dressings in place and has had some bloody drainage from's of the incision sites but no active bleeding this morning with just some ecchymosis surrounding the sites and old blood on the bandage, no significant tenderness palpation the right groin  Ext:  No cyanosis, clubbing, or edema    Recent labs that are back at this time have been reviewed.           Assessment/ Plan:    Mr. Varma is a 78 year old gentleman who is admitted after presented on 5/15/24 with an incarcerated right inguinal hernia containing appendix and portion of cecum     #Incarcerated right inguinal hernia containing bowel  -postop day 2 laparoscopic repair with biologic mesh and cecal wedge resection with appendectomy  -Labs are nonconcerning and vitals are stable  -Continues to have abdominal distention " although he is passing flatus reliably and this appears improved as compared to yesterday afternoon.  Continue just on a clear liquid diet for now  -No further need for antibiotics from my standpoint  -Ok to restart Asa and plavix from my standpoint  -Mobilize  -Awaiting more reliable return of bowel function          Raul An MD  5/17/2024  07:58 EDT

## 2024-05-18 LAB
ALBUMIN SERPL-MCNC: 3.5 G/DL (ref 3.5–5.2)
ALBUMIN/GLOB SERPL: 1.3 G/DL
ALP SERPL-CCNC: 80 U/L (ref 39–117)
ALT SERPL W P-5'-P-CCNC: 14 U/L (ref 1–41)
ANION GAP SERPL CALCULATED.3IONS-SCNC: 9 MMOL/L (ref 5–15)
AST SERPL-CCNC: 19 U/L (ref 1–40)
BASOPHILS # BLD AUTO: 0.03 10*3/MM3 (ref 0–0.2)
BASOPHILS NFR BLD AUTO: 0.4 % (ref 0–1.5)
BILIRUB SERPL-MCNC: 1.6 MG/DL (ref 0–1.2)
BUN SERPL-MCNC: 16 MG/DL (ref 8–23)
BUN/CREAT SERPL: 16.8 (ref 7–25)
CALCIUM SPEC-SCNC: 9 MG/DL (ref 8.6–10.5)
CHLORIDE SERPL-SCNC: 101 MMOL/L (ref 98–107)
CO2 SERPL-SCNC: 25 MMOL/L (ref 22–29)
CREAT SERPL-MCNC: 0.95 MG/DL (ref 0.76–1.27)
DEPRECATED RDW RBC AUTO: 53.4 FL (ref 37–54)
EGFRCR SERPLBLD CKD-EPI 2021: 82.4 ML/MIN/1.73
EOSINOPHIL # BLD AUTO: 0.52 10*3/MM3 (ref 0–0.4)
EOSINOPHIL NFR BLD AUTO: 7.6 % (ref 0.3–6.2)
ERYTHROCYTE [DISTWIDTH] IN BLOOD BY AUTOMATED COUNT: 16 % (ref 12.3–15.4)
GLOBULIN UR ELPH-MCNC: 2.8 GM/DL
GLUCOSE SERPL-MCNC: 94 MG/DL (ref 65–99)
HCT VFR BLD AUTO: 35.4 % (ref 37.5–51)
HGB BLD-MCNC: 11.3 G/DL (ref 13–17.7)
IMM GRANULOCYTES # BLD AUTO: 0.04 10*3/MM3 (ref 0–0.05)
IMM GRANULOCYTES NFR BLD AUTO: 0.6 % (ref 0–0.5)
LYMPHOCYTES # BLD AUTO: 1.21 10*3/MM3 (ref 0.7–3.1)
LYMPHOCYTES NFR BLD AUTO: 17.6 % (ref 19.6–45.3)
MAGNESIUM SERPL-MCNC: 2 MG/DL (ref 1.6–2.4)
MCH RBC QN AUTO: 29 PG (ref 26.6–33)
MCHC RBC AUTO-ENTMCNC: 31.9 G/DL (ref 31.5–35.7)
MCV RBC AUTO: 90.8 FL (ref 79–97)
MONOCYTES # BLD AUTO: 0.84 10*3/MM3 (ref 0.1–0.9)
MONOCYTES NFR BLD AUTO: 12.2 % (ref 5–12)
NEUTROPHILS NFR BLD AUTO: 4.22 10*3/MM3 (ref 1.7–7)
NEUTROPHILS NFR BLD AUTO: 61.6 % (ref 42.7–76)
NRBC BLD AUTO-RTO: 0 /100 WBC (ref 0–0.2)
PLATELET # BLD AUTO: 150 10*3/MM3 (ref 140–450)
PMV BLD AUTO: 11.1 FL (ref 6–12)
POTASSIUM SERPL-SCNC: 4.2 MMOL/L (ref 3.5–5.2)
PROT SERPL-MCNC: 6.3 G/DL (ref 6–8.5)
QT INTERVAL: 424 MS
QTC INTERVAL: 448 MS
RBC # BLD AUTO: 3.9 10*6/MM3 (ref 4.14–5.8)
SODIUM SERPL-SCNC: 135 MMOL/L (ref 136–145)
WBC NRBC COR # BLD AUTO: 6.86 10*3/MM3 (ref 3.4–10.8)

## 2024-05-18 PROCEDURE — 83735 ASSAY OF MAGNESIUM: CPT | Performed by: FAMILY MEDICINE

## 2024-05-18 PROCEDURE — 85025 COMPLETE CBC W/AUTO DIFF WBC: CPT | Performed by: FAMILY MEDICINE

## 2024-05-18 PROCEDURE — 99232 SBSQ HOSP IP/OBS MODERATE 35: CPT | Performed by: NURSE PRACTITIONER

## 2024-05-18 PROCEDURE — 80053 COMPREHEN METABOLIC PANEL: CPT | Performed by: FAMILY MEDICINE

## 2024-05-18 PROCEDURE — 25010000002 METOCLOPRAMIDE PER 10 MG: Performed by: SURGERY

## 2024-05-18 PROCEDURE — 93005 ELECTROCARDIOGRAM TRACING: CPT | Performed by: INTERNAL MEDICINE

## 2024-05-18 PROCEDURE — 25010000002 HEPARIN (PORCINE) PER 1000 UNITS: Performed by: SURGERY

## 2024-05-18 RX ADMIN — MONTELUKAST 10 MG: 10 TABLET, FILM COATED ORAL at 20:29

## 2024-05-18 RX ADMIN — HEPARIN SODIUM 5000 UNITS: 5000 INJECTION INTRAVENOUS; SUBCUTANEOUS at 14:09

## 2024-05-18 RX ADMIN — METOCLOPRAMIDE 10 MG: 5 INJECTION, SOLUTION INTRAMUSCULAR; INTRAVENOUS at 03:37

## 2024-05-18 RX ADMIN — HEPARIN SODIUM 5000 UNITS: 5000 INJECTION INTRAVENOUS; SUBCUTANEOUS at 06:02

## 2024-05-18 RX ADMIN — ASPIRIN 81 MG: 81 TABLET, COATED ORAL at 20:29

## 2024-05-18 RX ADMIN — ATORVASTATIN CALCIUM 10 MG: 10 TABLET, FILM COATED ORAL at 20:29

## 2024-05-18 RX ADMIN — OXYCODONE HYDROCHLORIDE AND ACETAMINOPHEN 2 TABLET: 5; 325 TABLET ORAL at 21:27

## 2024-05-18 RX ADMIN — DOCUSATE SODIUM 100 MG: 100 CAPSULE, LIQUID FILLED ORAL at 08:22

## 2024-05-18 RX ADMIN — OXYCODONE HYDROCHLORIDE AND ACETAMINOPHEN 2 TABLET: 5; 325 TABLET ORAL at 02:03

## 2024-05-18 RX ADMIN — METOCLOPRAMIDE 10 MG: 5 INJECTION, SOLUTION INTRAMUSCULAR; INTRAVENOUS at 10:01

## 2024-05-18 RX ADMIN — METOCLOPRAMIDE 10 MG: 5 INJECTION, SOLUTION INTRAMUSCULAR; INTRAVENOUS at 21:27

## 2024-05-18 RX ADMIN — OXYCODONE HYDROCHLORIDE AND ACETAMINOPHEN 2 TABLET: 5; 325 TABLET ORAL at 06:02

## 2024-05-18 RX ADMIN — Medication 10 ML: at 20:29

## 2024-05-18 RX ADMIN — CLOPIDOGREL BISULFATE 75 MG: 75 TABLET ORAL at 08:22

## 2024-05-18 RX ADMIN — PANTOPRAZOLE SODIUM 40 MG: 40 TABLET, DELAYED RELEASE ORAL at 06:02

## 2024-05-18 RX ADMIN — HEPARIN SODIUM 5000 UNITS: 5000 INJECTION INTRAVENOUS; SUBCUTANEOUS at 21:27

## 2024-05-18 RX ADMIN — BISACODYL 10 MG: 5 TABLET, COATED ORAL at 08:22

## 2024-05-18 RX ADMIN — METOCLOPRAMIDE 10 MG: 5 INJECTION, SOLUTION INTRAMUSCULAR; INTRAVENOUS at 15:44

## 2024-05-18 RX ADMIN — LISINOPRIL 20 MG: 20 TABLET ORAL at 08:22

## 2024-05-18 RX ADMIN — DOCUSATE SODIUM 100 MG: 100 CAPSULE, LIQUID FILLED ORAL at 20:31

## 2024-05-18 RX ADMIN — OXYCODONE HYDROCHLORIDE AND ACETAMINOPHEN 2 TABLET: 5; 325 TABLET ORAL at 16:21

## 2024-05-18 NOTE — PROGRESS NOTES
[facility] Medicine Services  PROGRESS NOTE    Patient Name: Dominik Varma  : 1945  MRN: [id number]    Date of Admission: 5/15/2024  Primary Care Physician: Kassidy Bolton PA-C    Subjective   Subjective     CC:  F/u hernia, AAA    HPI:  Resting in bed. NAD. Pain very well controlled. Tolerating advanced diet. Had a few BM's overnight and this AM.       Objective   Objective     Vital Signs:   Temp:  [97.4 °F (36.3 °C)-98 °F (36.7 °C)] 97.4 °F (36.3 °C)  Heart Rate:  [] 68  Resp:  [16-20] 18  BP: (111-163)/() 111/73     Physical Exam:  Constitutional: Awake, alert, resting in bed   HEENT: NCAT/EOMI, nares patent, MMM   Respiratory: Clear to auscultation bilaterally, no rhonchi or wheezing, respiratory effort normal   Cardiovascular: RRR, S1S2+   Gastrointestinal: Abd soft w/ mild distention, BS present, nontender to palpation; multiple lap sites with stained/ dry steristrips   Psychiatric: Appropriate affect, cooperative  Neurologic: No obvious focal deficits, speech clear and fluent    Results Reviewed:  LAB RESULTS:      Lab 24  03424  0542 24  0526 05/15/24  1131 05/15/24  1107   WBC 6.86 8.55 10.17  --  10.14   HEMOGLOBIN 11.3* 11.4* 11.5*  --  12.4*   HEMATOCRIT 35.4* 35.9* 36.2*  --  39.6   PLATELETS 150 124* 117*  --  126*   NEUTROS ABS 4.22  --  8.75*  --  7.69*   IMMATURE GRANS (ABS) 0.04  --  0.06*  --  0.05   LYMPHS ABS 1.21  --  0.50*  --  1.14   MONOS ABS 0.84  --  0.85  --  0.98*   EOS ABS 0.52*  --  0.00  --  0.25   MCV 90.8 92.1 92.1  --  93.4   PROTIME  --   --   --  15.4*  --          Lab 24  0345 24  0542 24  0526 05/15/24  1107   SODIUM 135* 139 135* 138   POTASSIUM 4.2 4.3 4.7 4.4   CHLORIDE 101 106 101 104   CO2 25.0 24.0 21.0* 25.0   ANION GAP 9.0 9.0 13.0 9.0   BUN 16 16 17 18   CREATININE 0.95 0.76 0.83 1.06   EGFR 82.4 92.6 90.1 72.3   GLUCOSE 94 94 154* 107*   CALCIUM 9.0 8.8 9.0 9.6   MAGNESIUM 2.0   --   --   --          Lab 05/18/24  0345 05/16/24  0526 05/15/24  1107   TOTAL PROTEIN 6.3 6.7 6.9   ALBUMIN 3.5 3.7 3.9   GLOBULIN 2.8 3.0 3.0   ALT (SGPT) 14 10 12   AST (SGOT) 19 14 16   BILIRUBIN 1.6* 2.0* 2.8*   ALK PHOS 80 78 85         Lab 05/15/24  1131   PROTIME 15.4*   INR 1.20*                 Brief Urine Lab Results  (Last result in the past 365 days)        Color   Clarity   Blood   Leuk Est   Nitrite   Protein   CREAT   Urine HCG        05/15/24 1107 Yellow   Clear   Negative   Negative   Negative   Negative                   Microbiology Results Abnormal       None            No radiology results from the last 24 hrs        Current medications:  Scheduled Meds:aspirin, 81 mg, Oral, Nightly  atorvastatin, 10 mg, Oral, Nightly  bisacodyl, 10 mg, Oral, Daily  clopidogrel, 75 mg, Oral, Daily  docusate sodium, 100 mg, Oral, BID  heparin (porcine), 5,000 Units, Subcutaneous, Q8H  lisinopril, 20 mg, Oral, Daily  metoclopramide, 10 mg, Intravenous, Q6H  montelukast, 10 mg, Oral, Nightly  pantoprazole, 40 mg, Oral, Q AM      Continuous Infusions:lactated ringers, 9 mL/hr      PRN Meds:.  hydrALAZINE    lactated ringers    Morphine **AND** naloxone    nitroglycerin    ondansetron ODT **OR** ondansetron    oxyCODONE-acetaminophen    [COMPLETED] Insert Peripheral IV **AND** sodium chloride    Assessment & Plan   Assessment & Plan     Active Hospital Problems    Diagnosis  POA    **Incarcerated right inguinal hernia [K40.30]  Yes      Resolved Hospital Problems   No resolved problems to display.        Brief Hospital Course to date:  Dominik Varma is a 78 y.o. male w/ CAD, HTN, HLD, prior stroke, remote RT inguinal hernia repair, who presented w/ 2 days RT groin pain, was found to have incarcerated hernia and was taken emergently to surgery; hospital medicine was asked to admit post-op; he was also found to have an 8cm AAA. Patient transferred to my services on the AM of 5/17, reviewed vascular recommendations,  plans for follow up with Dr. Merida on 5/28 at 10:30 AM for scheduling of future AAA repair. Continuing to follow with Dr. An, continuing on liquid diet for now, awaiting return of bowel function at this time.     Assessment/Plan    Incarcerated RT inguinal hernia  -s/p RT inguinal hernia repair w/ mesh placement and appendectomy via cecal wedge resection 5/15/24 w/ Dr. An  -post op management per surgical service  - Continued observation and follow with Dr. An   --cont to adv diet as lauryn; possibly home in AM     8 cm AAA  Hx tobacco use (quit 2023)  -seen by Dr. Merida, plans now for likely outpatient follow up on 5/28 with Dr. Merida at 10:30 AM     CAD  HTN  HLD  Hx stroke  -ASA, statin, plavix, lisinopril will be restarted on 5/17     Suspect new Dx cirrhosis  -cirrhotic morphology of the liver on imaging, w/ assoc thrombocytopenia and elevated bili  -opt f/u    Expected Discharge Location and Transportation: home  Expected Discharge   Expected Discharge Date: 5/19/2024; Expected Discharge Time:      DVT prophylaxis:  Medical and mechanical DVT prophylaxis orders are present.         AM-PAC 6 Clicks Score (PT): 20 (05/18/24 9906)    CODE STATUS:   Code Status and Medical Interventions:   Ordered at: 05/15/24 8416     Level Of Support Discussed With:    Patient     Code Status (Patient has no pulse and is not breathing):    CPR (Attempt to Resuscitate)     Medical Interventions (Patient has pulse or is breathing):    Full Support       Mikaela Burton, BEN  05/18/24

## 2024-05-18 NOTE — PLAN OF CARE
"Goal Outcome Evaluation:           Progress: improving  Outcome Evaluation: Pt remains on room air. NSR on monitor. C/o abdominal pain relieved with prn oxy. No n/v. Eating 25%-50% of meals today and tolerating but pt states he is \"just not hungry\". Pt napping most of the day. Wife at bedside. VSS                               "

## 2024-05-18 NOTE — PROGRESS NOTES
"Patient Name:  Dominik Varma  YOB: 1945  [id number]    Surgery Progress Note    Date of visit: 5/18/2024      Subjective: No acute events.  Had 4 watery bowel movements overnight.  Feels less bloated.  Denies nausea or vomiting.  Abdominal pain is improving.  Ambulated yesterday          Objective:     /76   Pulse 71   Temp 98 °F (36.7 °C) (Axillary)   Resp 18   Ht 182.9 cm (72\")   Wt 77.1 kg (170 lb)   SpO2 93%   BMI 23.06 kg/m²   No intake or output data in the 24 hours ending 05/18/24 0825    GEN:   Awake, alert, in no acute distress, resting comfortably in bed, elderly  CV:   Regular rate and rhythm  L:  Symmetric expansion, not labored on room air  Abd:  Soft, remains slightly distended but this is overall improved, appropriately tender ovation along incisions, incisions are clean dry and intact with only some old blood spotting on the dressings, no significant tenderness palpation the right groin  Ext:  No cyanosis, clubbing, or edema    Recent labs that are back at this time have been reviewed.           Assessment/ Plan:    Mr. Varma is a 78 year old gentleman who is admitted after presented on 5/15/24 with an incarcerated right inguinal hernia containing appendix and portion of cecum     #Incarcerated right inguinal hernia containing bowel  -postop day 3 laparoscopic repair with biologic mesh and cecal wedge resection with appendectomy  -Labs appropriate.  White blood cell count is 6  -Abdominal distention appears improved.  Having bowel function.  Tolerated full liquids yesterday without difficulty  -Advance to regular diet  -No further need for antibiotics from my standpoint  -If abdominal distention continues to improve and tolerates regular diet, likely discharge tomorrow         Raul An MD  5/18/2024  08:25 EDT      "

## 2024-05-19 ENCOUNTER — READMISSION MANAGEMENT (OUTPATIENT)
Dept: CALL CENTER | Facility: HOSPITAL | Age: 79
End: 2024-05-19
Payer: COMMERCIAL

## 2024-05-19 VITALS
TEMPERATURE: 97.8 F | OXYGEN SATURATION: 95 % | RESPIRATION RATE: 16 BRPM | DIASTOLIC BLOOD PRESSURE: 62 MMHG | BODY MASS INDEX: 23.03 KG/M2 | HEART RATE: 62 BPM | HEIGHT: 72 IN | SYSTOLIC BLOOD PRESSURE: 105 MMHG | WEIGHT: 170 LBS

## 2024-05-19 LAB
ALBUMIN SERPL-MCNC: 3.6 G/DL (ref 3.5–5.2)
ALBUMIN/GLOB SERPL: 1.5 G/DL
ALP SERPL-CCNC: 91 U/L (ref 39–117)
ALT SERPL W P-5'-P-CCNC: 14 U/L (ref 1–41)
ANION GAP SERPL CALCULATED.3IONS-SCNC: 8 MMOL/L (ref 5–15)
AST SERPL-CCNC: 16 U/L (ref 1–40)
BASOPHILS # BLD AUTO: 0.02 10*3/MM3 (ref 0–0.2)
BASOPHILS NFR BLD AUTO: 0.3 % (ref 0–1.5)
BILIRUB SERPL-MCNC: 1.6 MG/DL (ref 0–1.2)
BUN SERPL-MCNC: 16 MG/DL (ref 8–23)
BUN/CREAT SERPL: 17.8 (ref 7–25)
CALCIUM SPEC-SCNC: 9.1 MG/DL (ref 8.6–10.5)
CHLORIDE SERPL-SCNC: 104 MMOL/L (ref 98–107)
CO2 SERPL-SCNC: 24 MMOL/L (ref 22–29)
CREAT SERPL-MCNC: 0.9 MG/DL (ref 0.76–1.27)
DEPRECATED RDW RBC AUTO: 51.9 FL (ref 37–54)
EGFRCR SERPLBLD CKD-EPI 2021: 88 ML/MIN/1.73
EOSINOPHIL # BLD AUTO: 0.56 10*3/MM3 (ref 0–0.4)
EOSINOPHIL NFR BLD AUTO: 9.3 % (ref 0.3–6.2)
ERYTHROCYTE [DISTWIDTH] IN BLOOD BY AUTOMATED COUNT: 15.7 % (ref 12.3–15.4)
GLOBULIN UR ELPH-MCNC: 2.4 GM/DL
GLUCOSE SERPL-MCNC: 95 MG/DL (ref 65–99)
HCT VFR BLD AUTO: 36.2 % (ref 37.5–51)
HGB BLD-MCNC: 11.5 G/DL (ref 13–17.7)
IMM GRANULOCYTES # BLD AUTO: 0.04 10*3/MM3 (ref 0–0.05)
IMM GRANULOCYTES NFR BLD AUTO: 0.7 % (ref 0–0.5)
LYMPHOCYTES # BLD AUTO: 1.07 10*3/MM3 (ref 0.7–3.1)
LYMPHOCYTES NFR BLD AUTO: 17.8 % (ref 19.6–45.3)
MAGNESIUM SERPL-MCNC: 2 MG/DL (ref 1.6–2.4)
MCH RBC QN AUTO: 28.7 PG (ref 26.6–33)
MCHC RBC AUTO-ENTMCNC: 31.8 G/DL (ref 31.5–35.7)
MCV RBC AUTO: 90.3 FL (ref 79–97)
MONOCYTES # BLD AUTO: 0.67 10*3/MM3 (ref 0.1–0.9)
MONOCYTES NFR BLD AUTO: 11.1 % (ref 5–12)
NEUTROPHILS NFR BLD AUTO: 3.66 10*3/MM3 (ref 1.7–7)
NEUTROPHILS NFR BLD AUTO: 60.8 % (ref 42.7–76)
NRBC BLD AUTO-RTO: 0 /100 WBC (ref 0–0.2)
PLATELET # BLD AUTO: 155 10*3/MM3 (ref 140–450)
PMV BLD AUTO: 11.2 FL (ref 6–12)
POTASSIUM SERPL-SCNC: 4.2 MMOL/L (ref 3.5–5.2)
PROT SERPL-MCNC: 6 G/DL (ref 6–8.5)
RBC # BLD AUTO: 4.01 10*6/MM3 (ref 4.14–5.8)
SODIUM SERPL-SCNC: 136 MMOL/L (ref 136–145)
WBC NRBC COR # BLD AUTO: 6.02 10*3/MM3 (ref 3.4–10.8)

## 2024-05-19 PROCEDURE — 99239 HOSP IP/OBS DSCHRG MGMT >30: CPT | Performed by: NURSE PRACTITIONER

## 2024-05-19 PROCEDURE — 83735 ASSAY OF MAGNESIUM: CPT | Performed by: FAMILY MEDICINE

## 2024-05-19 PROCEDURE — 80053 COMPREHEN METABOLIC PANEL: CPT | Performed by: FAMILY MEDICINE

## 2024-05-19 PROCEDURE — 85025 COMPLETE CBC W/AUTO DIFF WBC: CPT | Performed by: FAMILY MEDICINE

## 2024-05-19 PROCEDURE — 25010000002 METOCLOPRAMIDE PER 10 MG: Performed by: SURGERY

## 2024-05-19 PROCEDURE — 25010000002 HEPARIN (PORCINE) PER 1000 UNITS: Performed by: SURGERY

## 2024-05-19 RX ORDER — OXYCODONE HYDROCHLORIDE AND ACETAMINOPHEN 5; 325 MG/1; MG/1
1 TABLET ORAL EVERY 4 HOURS PRN
Qty: 17 TABLET | Refills: 0 | Status: SHIPPED | OUTPATIENT
Start: 2024-05-19

## 2024-05-19 RX ORDER — BISACODYL 5 MG/1
5 TABLET, DELAYED RELEASE ORAL DAILY
Qty: 30 TABLET | Refills: 1 | Status: SHIPPED | OUTPATIENT
Start: 2024-05-19 | End: 2025-05-19

## 2024-05-19 RX ORDER — DOCUSATE SODIUM 100 MG/1
100 CAPSULE, LIQUID FILLED ORAL 2 TIMES DAILY
Qty: 20 CAPSULE | Refills: 0 | Status: SHIPPED | OUTPATIENT
Start: 2024-05-19

## 2024-05-19 RX ADMIN — PANTOPRAZOLE SODIUM 40 MG: 40 TABLET, DELAYED RELEASE ORAL at 06:04

## 2024-05-19 RX ADMIN — OXYCODONE HYDROCHLORIDE AND ACETAMINOPHEN 2 TABLET: 5; 325 TABLET ORAL at 11:57

## 2024-05-19 RX ADMIN — CLOPIDOGREL BISULFATE 75 MG: 75 TABLET ORAL at 08:19

## 2024-05-19 RX ADMIN — HEPARIN SODIUM 5000 UNITS: 5000 INJECTION INTRAVENOUS; SUBCUTANEOUS at 06:04

## 2024-05-19 RX ADMIN — METOCLOPRAMIDE 10 MG: 5 INJECTION, SOLUTION INTRAMUSCULAR; INTRAVENOUS at 04:09

## 2024-05-19 RX ADMIN — METOCLOPRAMIDE 10 MG: 5 INJECTION, SOLUTION INTRAMUSCULAR; INTRAVENOUS at 11:15

## 2024-05-19 RX ADMIN — BISACODYL 10 MG: 5 TABLET, COATED ORAL at 08:19

## 2024-05-19 RX ADMIN — DOCUSATE SODIUM 100 MG: 100 CAPSULE, LIQUID FILLED ORAL at 08:19

## 2024-05-19 RX ADMIN — LISINOPRIL 20 MG: 20 TABLET ORAL at 08:19

## 2024-05-19 RX ADMIN — OXYCODONE HYDROCHLORIDE AND ACETAMINOPHEN 2 TABLET: 5; 325 TABLET ORAL at 06:04

## 2024-05-19 NOTE — DISCHARGE SUMMARY
[facility] Medicine Services  DISCHARGE SUMMARY    Patient Name: Dominik Varma  : 1945  MRN: [id number]    Date of Admission: 5/15/2024  9:48 AM  Date of Discharge:  2024  Primary Care Physician: Kassidy Bolton PA-C    Consults       Date and Time Order Name Status Description    5/15/2024  5:57 PM Inpatient General Surgery Consult Completed     5/15/2024 12:09 PM Inpatient Vascular Surgery Consult Completed             Hospital Course       Active Hospital Problems    Diagnosis  POA    **Incarcerated right inguinal hernia [K40.30]  Yes      Resolved Hospital Problems   No resolved problems to display.          Hospital Course:  Dominik Varma is a 78 y.o. male   w/ CAD, HTN, HLD, prior stroke, remote RT inguinal hernia repair, who presented w/ 2 days RT groin pain, was found to have incarcerated hernia and was taken emergently to surgery; hospital medicine was asked to admit post-op; he was also found to have an 8cm AAA. Patient transferred to my services on the AM of , reviewed vascular recommendations, plans for follow up with Dr. Merida on  at 10:30 AM for scheduling of future AAA repair. Continuing to follow with Dr. An, continuing on liquid diet for now, awaiting return of bowel function at this time.      Incarcerated RT inguinal hernia  -s/p RT inguinal hernia repair w/ mesh placement and appendectomy via cecal wedge resection 5/15/24 w/ Dr. An  -post op management per surgical service  -tolerating diet and having BM's   --pain controlled   --follow up in 2 weeks outpatient        8 cm AAA  Hx tobacco use (quit )  -seen by Dr. Merida, plans now for likely outpatient follow up on  with Dr. Merida at 10:30 AM      CAD  HTN  HLD  Hx stroke  -ASA, statin, plavix, lisinopril will be restarted on       Suspect new Dx cirrhosis  -cirrhotic morphology of the liver on imaging, w/ assoc thrombocytopenia and elevated bili  -opt f/u    Discharge Follow  Up Recommendations for outpatient labs/diagnostics:   PCP 1-2 weeks  Dr An 2 weeks     Day of Discharge     HPI:   Feel well. NAD. Pain well controlled, more soreness. +BM. Tolerating diet. Eager to go home. No f/c, n/v/d, soa or cp     Review of Systems  All other systems negative     Vital Signs:   Temp:  [97.6 °F (36.4 °C)-98.3 °F (36.8 °C)] 97.8 °F (36.6 °C)  Heart Rate:  [58-87] 62  Resp:  [16-18] 16  BP: (103-140)/(62-78) 105/62      Physical Exam:  Constitutional: Awake, alert, resting in bed   HEENT: NCAT/EOMI, nares patent, MMM   Respiratory: Clear to auscultation bilaterally, no rhonchi or wheezing, respiratory effort normal   Cardiovascular: RRR, S1S2+   Gastrointestinal: Abd soft, no distention, BS present, nontender to palpation; multiple lap sites with stained/ dry steristrips and surrounding bruising   Psychiatric: Appropriate affect, cooperative  Neurologic: No obvious focal deficits, speech clear and fluent    Pertinent  and/or Most Recent Results     LAB RESULTS:      Lab 05/19/24  0545 05/18/24  0345 05/17/24  0542 05/16/24  0526 05/15/24  1131 05/15/24  1107   WBC 6.02 6.86 8.55 10.17  --  10.14   HEMOGLOBIN 11.5* 11.3* 11.4* 11.5*  --  12.4*   HEMATOCRIT 36.2* 35.4* 35.9* 36.2*  --  39.6   PLATELETS 155 150 124* 117*  --  126*   NEUTROS ABS 3.66 4.22  --  8.75*  --  7.69*   IMMATURE GRANS (ABS) 0.04 0.04  --  0.06*  --  0.05   LYMPHS ABS 1.07 1.21  --  0.50*  --  1.14   MONOS ABS 0.67 0.84  --  0.85  --  0.98*   EOS ABS 0.56* 0.52*  --  0.00  --  0.25   MCV 90.3 90.8 92.1 92.1  --  93.4   PROTIME  --   --   --   --  15.4*  --          Lab 05/19/24  0546 05/18/24  0345 05/17/24  0542 05/16/24  0526 05/15/24  1107   SODIUM 136 135* 139 135* 138   POTASSIUM 4.2 4.2 4.3 4.7 4.4   CHLORIDE 104 101 106 101 104   CO2 24.0 25.0 24.0 21.0* 25.0   ANION GAP 8.0 9.0 9.0 13.0 9.0   BUN 16 16 16 17 18   CREATININE 0.90 0.95 0.76 0.83 1.06   EGFR 88.0 82.4 92.6 90.1 72.3   GLUCOSE 95 94 94 154* 107*    CALCIUM 9.1 9.0 8.8 9.0 9.6   MAGNESIUM 2.0 2.0  --   --   --          Lab 05/19/24  0546 05/18/24  0345 05/16/24  0526 05/15/24  1107   TOTAL PROTEIN 6.0 6.3 6.7 6.9   ALBUMIN 3.6 3.5 3.7 3.9   GLOBULIN 2.4 2.8 3.0 3.0   ALT (SGPT) 14 14 10 12   AST (SGOT) 16 19 14 16   BILIRUBIN 1.6* 1.6* 2.0* 2.8*   ALK PHOS 91 80 78 85         Lab 05/15/24  1131   PROTIME 15.4*   INR 1.20*                 Brief Urine Lab Results  (Last result in the past 365 days)        Color   Clarity   Blood   Leuk Est   Nitrite   Protein   CREAT   Urine HCG        05/15/24 1107 Yellow   Clear   Negative   Negative   Negative   Negative                 Microbiology Results (last 10 days)       ** No results found for the last 240 hours. **            Peripheral Block    Result Date: 5/15/2024  Cristopher Castro SRNA     5/15/2024  3:03 PM Peripheral Block Patient reassessed immediately prior to procedure Patient location during procedure: OR Start time: 5/15/2024 2:42 PM Reason for block: at surgeon's request and post-op pain management Performed by CRNA/CAA: Regulo Nguyen CRNASRNA: Cristopher Castro SRNA Preanesthetic Checklist Completed: patient identified, IV checked, site marked, risks and benefits discussed, surgical consent, monitors and equipment checked, pre-op evaluation and timeout performed Prep: Pt Position: supine Sterile barriers:cap, gloves, mask and washed/disinfected hands Prep: ChloraPrep Patient monitoring: blood pressure monitoring, continuous pulse oximetry and EKG Procedure Sedation: yes Performed under: general Guidance:ultrasound guided Images:still images obtained, printed/placed on chart Laterality:Bilateral Block Type:TAP Injection Technique:single-shot Needle Type:echogenic Needle Gauge:20 G Resistance on Injection: none Medications Used: bupivacaine PF (MARCAINE) 0.25 % injection - Injection  60 mL - 5/15/2024 2:42:00 PM dexamethasone sodium phosphate injection - Injection  4 mg - 5/15/2024  "2:42:00 PM Medications Comment:Block Injection:  LA dose divided between Right and Left block Post Assessment Injection Assessment: negative aspiration for heme, incremental injection and no paresthesia on injection Patient Tolerance:comfortable throughout block Complications:no Additional Notes Mid-Axillary/Lateral TAPs A high-frequency linear transducer, with sterile cover, was placed in the midaxillary line between the ASIS and costal margin. The External Oblique Muscle (EOM), Internal Oblique Muscle (IOM), Transverse Abdominus Muscle (CABRERA), and Peritoneum were identified. The insertion site was prepped in sterile fashion and then localized with 2-5 ml of 1% Lidocaine. Using ultrasound-guidance, a 20-gauge B-Munoz 4\" Ultraplex 360 non-stimulating echogenic needle was advanced in plane, from medial to lateral, until the tip of the needle was in the fascial plane between the IOM and CABRERA. 1-3ml of preservative free normal saline was used to hydro-dissect the fascial planes. After the fascial plane was verified, the local anesthetic (LA) was injected. The procedure was repeated on the opposite side for bilateral coverage. Aspiration every 5 ml to prevent intravascular injection. Injection was completed with negative aspiration of blood and negative intravascular injection. Injection pressures were normal with minimal resistance. Midaxillary TAPs should reach intercostal nerves T10- T11 and the subcostal nerve T12.      CT Abdomen Pelvis Without Contrast    Result Date: 5/15/2024  EXAM: CT ABDOMEN PELVIS WO CONTRAST-  DATE OF EXAM: 5/15/2024 10:23 AM  INDICATION: Right inguinal pain..  COMPARISON: None available  TECHNIQUE: Contiguous axial CT images were obtained from the lung bases to the pubic symphysis without contrast. Sagittal and coronal reconstructions were performed.  Automated exposure control and iterative reconstruction methods were used.  FINDINGS: The lack of intravenous contrast limits the evaluation of " visceral and vascular structures.  The heart size is normal. There is trace left-sided pleural effusion. There is linear bandlike atelectasis within the left lower lobe.  There is cirrhotic liver morphology. There is no focal liver lesion identified by noncontrast technique. The gallbladder is present without wall thickening. There is no intrahepatic or extrahepatic biliary ductal dilatation. The spleen measures 11.7 cm in craniocaudal dimension. The adrenal glands appear within normal limits. There is fatty interdigitation of the pancreas. There is no pancreatic ductal dilatation.  The kidneys are symmetric in size. There is no hydronephrosis. There is an exophytic 3.2 cm cyst arising from the lateral aspect of the left kidney. There is no urolithiasis. The urinary bladder is fluid-filled without wall thickening. The prostate is normal in size with central calcification.  The stomach and duodenum are normal in caliber and configuration. There are no abnormally dilated loops of small bowel to suggest small bowel obstruction or small bowel inflammation. There is a right inguinal hernia which contains the base of the cecum and the suspected appendix. There is some mild inflammatory stranding surrounding the hernia sac as well as fluid tracking into the right inguinal canal. There is a small right hydrocele. The inflammation of the hernia sac is more likely related to the hernia being strangulated or incarcerated rather than a primary appendicitis within the hernia. There is a moderate colonic stool burden. There is no free air.  There is a large infrarenal saccular aneurysm arising from the left anterior aspect of the aorta. There is no surrounding blood products to suggest acute rupture. This measures up to 7.7 cm in maximal dimension. There is no mesenteric, retroperitoneal, or pelvic lymphadenopathy by size criteria. There is degenerative disc disease at L2-L3.      1. Large infrarenal saccular type abdominal  aortic aneurysm measuring up to 7.7 cm. Vascular surgery consultation is recommended given the size. No evidence of surrounding blood products to suggest acute rupture at this time. 2. Right inguinal hernia containing portions of the base of the cecum and appendix. There is inflammatory stranding within the hernia sac and adjacent surrounding fluid. This is more concerning for a strangulated or incarcerated right inguinal hernia rather than a primary appendicitis within the hernia sac. 3. Cirrhotic liver morphology.  Findings called to Wiliam Flor at 10:55 a.m. on 5/15/2024      This report was finalized on 5/15/2024 11:04 AM by David Velez MD.                   Plan for Follow-up of Pending Labs/Results:    Discharge Details        Discharge Medications        New Medications        Instructions Start Date   bisacodyl 5 MG EC tablet  Commonly known as: Dulcolax   5 mg, Oral, Daily      docusate sodium 100 MG capsule  Commonly known as: COLACE   100 mg, Oral, 2 Times Daily      oxyCODONE-acetaminophen 5-325 MG per tablet  Commonly known as: PERCOCET   1 tablet, Oral, Every 4 Hours PRN             Continue These Medications        Instructions Start Date   aspirin 81 MG EC tablet   81 mg, Oral, Nightly      atorvastatin 10 MG tablet  Commonly known as: LIPITOR   10 mg, Oral, Daily      clopidogrel 75 MG tablet  Commonly known as: PLAVIX   75 mg, Oral, Daily      esomeprazole 20 MG capsule  Commonly known as: nexIUM   20 mg, Oral, Every Morning Before Breakfast, OTC      lisinopril 20 MG tablet  Commonly known as: PRINIVIL,ZESTRIL   20 mg, Oral, Daily      montelukast 10 MG tablet  Commonly known as: SINGULAIR   10 mg, Oral, Nightly               No Known Allergies      Discharge Disposition:  Home or Self Care    Diet:  Hospital:  Diet Order   Procedures    Diet: Regular/House; Fluid Consistency: Thin (IDDSI 0)             Restrictions or Other Recommendations:  No lifting over 10 lbs        CODE STATUS:     Code Status and Medical Interventions:   Ordered at: 05/15/24 7188     Level Of Support Discussed With:    Patient     Code Status (Patient has no pulse and is not breathing):    CPR (Attempt to Resuscitate)     Medical Interventions (Patient has pulse or is breathing):    Full Support       No future appointments.    Additional Instructions for the Follow-ups that You Need to Schedule       Ambulatory Referral to Home Health   As directed      Face to Face Visit Date: 5/17/2024   Follow-up provider for Plan of Care?: I treated the patient in an acute care facility and will not continue treatment after discharge.   Follow-up provider: MILADY CROCKER [[id number]]   Reason/Clinical Findings: inguinal hernia, AAA, HTN, hx CVA   Describe mobility limitations that make leaving home difficult: impaired balance/endurance/activity tolerance/strength, pain, SOA   Nursing/Therapeutic Services Requested: Physical Therapy   Frequency: 1 Week 1        Discharge Follow-up with Specialty: Dr. An; 2 Weeks   As directed      Specialty: Dr. An   Follow Up: 2 Weeks                      BEN Guthrie  05/19/24      Time Spent on Discharge:  I spent  40  minutes on this discharge activity which included: face-to-face encounter with the patient, reviewing the data in the system, coordination of the care with the nursing staff as well as consultants, documentation, and entering orders.

## 2024-05-19 NOTE — CASE MANAGEMENT/SOCIAL WORK
Case Management Discharge Note      Final Note: Discharge summary and orders faxed to [facility]  at [phone]. I have notified them of patient's discharge today. Patient transpored by family.         Selected Continued Care - Discharged on 5/19/2024 Admission date: 5/15/2024 - Discharge disposition: Home or Self Care      Destination    No services have been selected for the patient.                Durable Medical Equipment    No services have been selected for the patient.                Dialysis/Infusion    No services have been selected for the patient.                Home Medical Care Coordination complete.      Service Provider Selected Services Address Phone Fax Patient Preferred    [facility] ,  [facility] [address] [facility] [address] [phone] --              Therapy    No services have been selected for the patient.                Community Resources    No services have been selected for the patient.                Community & DME    No services have been selected for the patient.                    Transportation Services  Private: Car    Final Discharge Disposition Code: 06 - home with home health care

## 2024-05-19 NOTE — PROGRESS NOTES
"Patient Name:  Dominik Varma  YOB: 1945  [id number]    Surgery Progress Note    Date of visit: 5/19/2024    Subjective   Subjective: Feeling better. Had several BM's. Wants to go home.         Objective     Objective:     /77 (BP Location: Left arm, Patient Position: Lying)   Pulse 63   Temp 97.8 °F (36.6 °C) (Oral)   Resp 16   Ht 182.9 cm (72\")   Wt 77.1 kg (170 lb)   SpO2 94%   BMI 23.06 kg/m²     Intake/Output Summary (Last 24 hours) at 5/19/2024 0709  Last data filed at 5/18/2024 1820  Gross per 24 hour   Intake 527 ml   Output --   Net 527 ml       CV:  Rhythm  regular and rate regular   L:  Clear  to auscultation bilaterally   Abd:  Bowel sounds positive , soft, minimally tender. Dressings c/d/I.  Ext:  No cyanosis, clubbing, edema    Recent labs that are back at this time have been reviewed.            Assessment/ Plan:    Problem List Items Addressed This Visit          Cardiac and Vasculature    Essential hypertension, benign    Relevant Medications    lisinopril (PRINIVIL,ZESTRIL) tablet 20 mg    hydrALAZINE (APRESOLINE) injection 10 mg    Other Relevant Orders    Ambulatory Referral to Home Health       Gastrointestinal Abdominal     * (Principal) Incarcerated right inguinal hernia- Doing well after robotic appendectomy and repair of right inguinal hernia.  Okay to go home from my standpoint, return to clinic with Dr. An in 2 weeks.  No lifting more than 15 to 20 pounds until return to clinic.    Relevant Orders    Tissue Pathology Exam (Completed)    Ambulatory Referral to Home Health     Other Visit Diagnoses       Incarcerated inguinal hernia    -  Primary    right    Relevant Orders    Ambulatory Referral to Home Health    Infrarenal abdominal aortic aneurysm (AAA) without rupture        Relevant Orders    Ambulatory Referral to Home Health             Active Hospital Problems    Diagnosis  POA    **Incarcerated right inguinal hernia [K40.30]  Yes      Resolved " Hospital Problems   No resolved problems to display.              Sam Healy MD  5/19/2024  07:09 EDT

## 2024-05-19 NOTE — PLAN OF CARE
Goal Outcome Evaluation:      Pt's VSS, RA, NSR-SB w/ 1st degree AV block. Complaints of abdomen pain addressed with PRN pain medications with relief. No other complaints or concerns. Continue POC.

## 2024-05-19 NOTE — DISCHARGE INSTR - APPOINTMENTS
Mono Merida MD  Vascular Surgery [phone]  [address] [facility] [address]      Next Steps: Go on 5/28/2024  Instructions: Follow up with Dr. Merida on 5/28/2024 @ 10:30AM         Raul An MD    General Surgery   [phone]    [address] [facility] [facility] [address]      Next Steps: Schedule an appointment as soon as possible for a visit in 2 week(s)       Kassidy Bolton PA-C [facility] - General  [phone]  [address] [facility] [address] [facility] [address]      Next Steps: Schedule an appointment as soon as possible for a visit in 2 week(s)    Call Monday to schedule these appointments.

## 2024-05-19 NOTE — OUTREACH NOTE
Prep Survey      Flowsheet Row Responses   [facility] patient discharged from? [facility]   Is LACE score < 7 ? No   Eligibility [facility]   Date of Admission 05/15/24   Date of Discharge 05/19/24   Discharge Disposition Home-Health Care [facility]   Discharge diagnosis *Incarcerated right inguinal hernia (RT inguinal hernia repair w/ mesh placement and appendectomy via cecal wedge resection   Does the patient have one of the following disease processes/diagnoses(primary or secondary)? General Surgery   Does the patient have Home health ordered? Yes   What is the Home health agency?  [facility]   Is there a DME ordered? No   Prep survey completed? Yes            JOSE CRUZ DAVILA - Registered Nurse

## 2024-05-19 NOTE — DISCHARGE PLACEMENT REQUEST
"AvaniAlisa (78 y.o. Male)   [facility] fax [phone]      Date of Birth   1945    Social Security Number       Address   [address]  [facility] [address]    Home Phone   [phone]    MRN   [id number]       Rastafari   Uatsdin    Marital Status                               Admission Date   5/15/24    Admission Type   Emergency    Admitting Provider   Asia House DO    Attending Provider   Asia House DO    Department, Room/Bed   [facility] 6B, N627/1       Discharge Date       Discharge Disposition   Home or Self Care    Discharge Destination                                 Attending Provider: Asia House DO    Allergies: No Known Allergies    Isolation: None   Infection: None   Code Status: CPR    Ht: 182.9 cm (72\")   Wt: 77.1 kg (170 lb)    Admission Cmt: None   Principal Problem: Incarcerated right inguinal hernia [K40.30]                   Active Insurance as of 5/15/2024       Primary Coverage       Payor Plan Insurance Group Employer/Plan Group    ANTHEM BLUE CROSS ANTHEM BLUE CROSS BLUE SHIELD PPO [id number]       Payor Plan Address Payor Plan Phone Number Payor Plan Fax Number Effective Dates    [address] [phone]  2024 - None Entered    [facility] [id number]         Subscriber Name Subscriber Birth Date Member ID       ALISA MANNING 1945 [id number]                     Emergency Contacts        (Rel.) Home Phone Work Phone Mobile Phone    Anita Manning (Spouse) [phone] -- [phone]    Alisa Manning,II (Son) [phone] -- [phone]    Couch AvaniLazara (Daughter) -- -- [phone]             [facility] [address]  [address] [facility] [address]  Phone:  [phone]  Fax:  [phone] Date: May 17, 2024      Ambulatory Referral to Home Health     Patient:  Alisa Manning MRN:  [id number]   [address]   [facility] [address] :  1945  SSN:    Phone: " [phone] Sex:  M      INSURANCE PAYOR PLAN GROUP # SUBSCRIBER ID   Primary:    CASSIA RAMOS [id number]      Referring Provider Information:  LEE ANDERSEN Phone: [phone] Fax: [phone]       Referral Information:   # Visits:  999 Referral Type: Home Health [42]   Urgency:  Routine Referral Reason: Specialty Services Required   Start Date: May 17, 2024 End Date:  To be determined by Insurer   Diagnosis: Incarcerated inguinal hernia (K40.30 [ICD-10-CM] 550.10 [ICD-9-CM])  Infrarenal abdominal aortic aneurysm (AAA) without rupture (I71.43 [ICD-10-CM] 441.4 [ICD-9-CM])  Incarcerated right inguinal hernia (K40.30 [ICD-10-CM] 550.10 [ICD-9-CM])  Essential hypertension, benign (I10 [ICD-10-CM] 401.1 [ICD-9-CM])      Refer to Dept:   Refer to Provider:   Refer to Provider Phone:   Refer to Facility:       Face to Face Visit Date: 5/17/2024  Follow-up provider for Plan of Care? I treated the patient in an acute care facility and will not continue treatment after discharge.  Follow-up provider: MILADY CROCKER [[id number]]  Reason/Clinical Findings: inguinal hernia, AAA, HTN, hx CVA  Describe mobility limitations that make leaving home difficult: impaired balance/endurance/activity tolerance/strength, pain, SOA  Nursing/Therapeutic Services Requested: Physical Therapy  Frequency: 1 Week 1     This document serves as a request of services and does not constitute Insurance authorization or approval of services.  To determine eligibility, please contact the members Insurance carrier to verify and review coverage.     If you have medical questions regarding this request for services. Please contact [address] at [phone] during normal business hours.        Verbal Order Mode: Telephone with readback   Authorizing Provider: LEE Andersen DO  Authorizing Provider's NPI: [id number]     Order Entered By: Katy Gillis RN 5/17/2024 11:22 AM     Electronically signed by:  LEE Feliciano, DO 2024 12:08 PM          Discharge Summary        Mikaela Burton, APRN at 24 1115              [facility] Medicine Services  DISCHARGE SUMMARY    Patient Name: Dominik Varma  : 1945  MRN: [id number]    Date of Admission: 5/15/2024  9:48 AM  Date of Discharge:  2024  Primary Care Physician: Kassidy Bolton PA-C    Consults       Date and Time Order Name Status Description    5/15/2024  5:57 PM Inpatient General Surgery Consult Completed     5/15/2024 12:09 PM Inpatient Vascular Surgery Consult Completed             Hospital Course       Active Hospital Problems    Diagnosis  POA    **Incarcerated right inguinal hernia [K40.30]  Yes      Resolved Hospital Problems   No resolved problems to display.          Hospital Course:  Dominik Varma is a 78 y.o. male   w/ CAD, HTN, HLD, prior stroke, remote RT inguinal hernia repair, who presented w/ 2 days RT groin pain, was found to have incarcerated hernia and was taken emergently to surgery; hospital medicine was asked to admit post-op; he was also found to have an 8cm AAA. Patient transferred to my services on the AM of , reviewed vascular recommendations, plans for follow up with Dr. Merida on  at 10:30 AM for scheduling of future AAA repair. Continuing to follow with Dr. An, continuing on liquid diet for now, awaiting return of bowel function at this time.      Incarcerated RT inguinal hernia  -s/p RT inguinal hernia repair w/ mesh placement and appendectomy via cecal wedge resection 5/15/24 w/ Dr. An  -post op management per surgical service  -tolerating diet and having BM's   --pain controlled   --follow up in 2 weeks outpatient        8 cm AAA  Hx tobacco use (quit )  -seen by Dr. Merida, plans now for likely outpatient follow up on  with Dr. Merida at 10:30 AM      CAD  HTN  HLD  Hx stroke  -ASA, statin, plavix, lisinopril will be restarted on       Suspect new Dx  cirrhosis  -cirrhotic morphology of the liver on imaging, w/ assoc thrombocytopenia and elevated bili  -opt f/u    Discharge Follow Up Recommendations for outpatient labs/diagnostics:   PCP 1-2 weeks  Dr An 2 weeks     Day of Discharge     HPI:   Feel well. NAD. Pain well controlled, more soreness. +BM. Tolerating diet. Eager to go home. No f/c, n/v/d, soa or cp     Review of Systems  All other systems negative     Vital Signs:   Temp:  [97.6 °F (36.4 °C)-98.3 °F (36.8 °C)] 97.8 °F (36.6 °C)  Heart Rate:  [58-87] 62  Resp:  [16-18] 16  BP: (103-140)/(62-78) 105/62      Physical Exam:  Constitutional: Awake, alert, resting in bed   HEENT: NCAT/EOMI, nares patent, MMM   Respiratory: Clear to auscultation bilaterally, no rhonchi or wheezing, respiratory effort normal   Cardiovascular: RRR, S1S2+   Gastrointestinal: Abd soft, no distention, BS present, nontender to palpation; multiple lap sites with stained/ dry steristrips and surrounding bruising   Psychiatric: Appropriate affect, cooperative  Neurologic: No obvious focal deficits, speech clear and fluent    Pertinent  and/or Most Recent Results     LAB RESULTS:      Lab 05/19/24  0545 05/18/24  0345 05/17/24  0542 05/16/24  0526 05/15/24  1131 05/15/24  1107   WBC 6.02 6.86 8.55 10.17  --  10.14   HEMOGLOBIN 11.5* 11.3* 11.4* 11.5*  --  12.4*   HEMATOCRIT 36.2* 35.4* 35.9* 36.2*  --  39.6   PLATELETS 155 150 124* 117*  --  126*   NEUTROS ABS 3.66 4.22  --  8.75*  --  7.69*   IMMATURE GRANS (ABS) 0.04 0.04  --  0.06*  --  0.05   LYMPHS ABS 1.07 1.21  --  0.50*  --  1.14   MONOS ABS 0.67 0.84  --  0.85  --  0.98*   EOS ABS 0.56* 0.52*  --  0.00  --  0.25   MCV 90.3 90.8 92.1 92.1  --  93.4   PROTIME  --   --   --   --  15.4*  --          Lab 05/19/24  0546 05/18/24  0345 05/17/24  0542 05/16/24  0526 05/15/24  1107   SODIUM 136 135* 139 135* 138   POTASSIUM 4.2 4.2 4.3 4.7 4.4   CHLORIDE 104 101 106 101 104   CO2 24.0 25.0 24.0 21.0* 25.0   ANION GAP 8.0 9.0 9.0  13.0 9.0   BUN 16 16 16 17 18   CREATININE 0.90 0.95 0.76 0.83 1.06   EGFR 88.0 82.4 92.6 90.1 72.3   GLUCOSE 95 94 94 154* 107*   CALCIUM 9.1 9.0 8.8 9.0 9.6   MAGNESIUM 2.0 2.0  --   --   --          Lab 05/19/24  0546 05/18/24  0345 05/16/24  0526 05/15/24  1107   TOTAL PROTEIN 6.0 6.3 6.7 6.9   ALBUMIN 3.6 3.5 3.7 3.9   GLOBULIN 2.4 2.8 3.0 3.0   ALT (SGPT) 14 14 10 12   AST (SGOT) 16 19 14 16   BILIRUBIN 1.6* 1.6* 2.0* 2.8*   ALK PHOS 91 80 78 85         Lab 05/15/24  1131   PROTIME 15.4*   INR 1.20*                 Brief Urine Lab Results  (Last result in the past 365 days)        Color   Clarity   Blood   Leuk Est   Nitrite   Protein   CREAT   Urine HCG        05/15/24 1107 Yellow   Clear   Negative   Negative   Negative   Negative                 Microbiology Results (last 10 days)       ** No results found for the last 240 hours. **            Peripheral Block    Result Date: 5/15/2024  Cristopher Castro SRNA     5/15/2024  3:03 PM Peripheral Block Patient reassessed immediately prior to procedure Patient location during procedure: OR Start time: 5/15/2024 2:42 PM Reason for block: at surgeon's request and post-op pain management Performed by CRNA/CAA: Regulo Nguyen CRNASRNA: Cristopher Castro SRNA Preanesthetic Checklist Completed: patient identified, IV checked, site marked, risks and benefits discussed, surgical consent, monitors and equipment checked, pre-op evaluation and timeout performed Prep: Pt Position: supine Sterile barriers:cap, gloves, mask and washed/disinfected hands Prep: ChloraPrep Patient monitoring: blood pressure monitoring, continuous pulse oximetry and EKG Procedure Sedation: yes Performed under: general Guidance:ultrasound guided Images:still images obtained, printed/placed on chart Laterality:Bilateral Block Type:TAP Injection Technique:single-shot Needle Type:echogenic Needle Gauge:20 G Resistance on Injection: none Medications Used: bupivacaine PF (MARCAINE)  "0.25 % injection - Injection  60 mL - 5/15/2024 2:42:00 PM dexamethasone sodium phosphate injection - Injection  4 mg - 5/15/2024 2:42:00 PM Medications Comment:Block Injection:  LA dose divided between Right and Left block Post Assessment Injection Assessment: negative aspiration for heme, incremental injection and no paresthesia on injection Patient Tolerance:comfortable throughout block Complications:no Additional Notes Mid-Axillary/Lateral TAPs A high-frequency linear transducer, with sterile cover, was placed in the midaxillary line between the ASIS and costal margin. The External Oblique Muscle (EOM), Internal Oblique Muscle (IOM), Transverse Abdominus Muscle (CABRERA), and Peritoneum were identified. The insertion site was prepped in sterile fashion and then localized with 2-5 ml of 1% Lidocaine. Using ultrasound-guidance, a 20-gauge B-Munoz 4\" Ultraplex 360 non-stimulating echogenic needle was advanced in plane, from medial to lateral, until the tip of the needle was in the fascial plane between the IOM and CABRERA. 1-3ml of preservative free normal saline was used to hydro-dissect the fascial planes. After the fascial plane was verified, the local anesthetic (LA) was injected. The procedure was repeated on the opposite side for bilateral coverage. Aspiration every 5 ml to prevent intravascular injection. Injection was completed with negative aspiration of blood and negative intravascular injection. Injection pressures were normal with minimal resistance. Midaxillary TAPs should reach intercostal nerves T10- T11 and the subcostal nerve T12.      CT Abdomen Pelvis Without Contrast    Result Date: 5/15/2024  EXAM: CT ABDOMEN PELVIS WO CONTRAST-  DATE OF EXAM: 5/15/2024 10:23 AM  INDICATION: Right inguinal pain..  COMPARISON: None available  TECHNIQUE: Contiguous axial CT images were obtained from the lung bases to the pubic symphysis without contrast. Sagittal and coronal reconstructions were performed.  Automated " exposure control and iterative reconstruction methods were used.  FINDINGS: The lack of intravenous contrast limits the evaluation of visceral and vascular structures.  The heart size is normal. There is trace left-sided pleural effusion. There is linear bandlike atelectasis within the left lower lobe.  There is cirrhotic liver morphology. There is no focal liver lesion identified by noncontrast technique. The gallbladder is present without wall thickening. There is no intrahepatic or extrahepatic biliary ductal dilatation. The spleen measures 11.7 cm in craniocaudal dimension. The adrenal glands appear within normal limits. There is fatty interdigitation of the pancreas. There is no pancreatic ductal dilatation.  The kidneys are symmetric in size. There is no hydronephrosis. There is an exophytic 3.2 cm cyst arising from the lateral aspect of the left kidney. There is no urolithiasis. The urinary bladder is fluid-filled without wall thickening. The prostate is normal in size with central calcification.  The stomach and duodenum are normal in caliber and configuration. There are no abnormally dilated loops of small bowel to suggest small bowel obstruction or small bowel inflammation. There is a right inguinal hernia which contains the base of the cecum and the suspected appendix. There is some mild inflammatory stranding surrounding the hernia sac as well as fluid tracking into the right inguinal canal. There is a small right hydrocele. The inflammation of the hernia sac is more likely related to the hernia being strangulated or incarcerated rather than a primary appendicitis within the hernia. There is a moderate colonic stool burden. There is no free air.  There is a large infrarenal saccular aneurysm arising from the left anterior aspect of the aorta. There is no surrounding blood products to suggest acute rupture. This measures up to 7.7 cm in maximal dimension. There is no mesenteric, retroperitoneal, or pelvic  lymphadenopathy by size criteria. There is degenerative disc disease at L2-L3.      1. Large infrarenal saccular type abdominal aortic aneurysm measuring up to 7.7 cm. Vascular surgery consultation is recommended given the size. No evidence of surrounding blood products to suggest acute rupture at this time. 2. Right inguinal hernia containing portions of the base of the cecum and appendix. There is inflammatory stranding within the hernia sac and adjacent surrounding fluid. This is more concerning for a strangulated or incarcerated right inguinal hernia rather than a primary appendicitis within the hernia sac. 3. Cirrhotic liver morphology.  Findings called to Wiliam Flor at 10:55 a.m. on 5/15/2024      This report was finalized on 5/15/2024 11:04 AM by David Velez MD.                   Plan for Follow-up of Pending Labs/Results:    Discharge Details        Discharge Medications        New Medications        Instructions Start Date   bisacodyl 5 MG EC tablet  Commonly known as: Dulcolax   5 mg, Oral, Daily      docusate sodium 100 MG capsule  Commonly known as: COLACE   100 mg, Oral, 2 Times Daily      oxyCODONE-acetaminophen 5-325 MG per tablet  Commonly known as: PERCOCET   1 tablet, Oral, Every 4 Hours PRN             Continue These Medications        Instructions Start Date   aspirin 81 MG EC tablet   81 mg, Oral, Nightly      atorvastatin 10 MG tablet  Commonly known as: LIPITOR   10 mg, Oral, Daily      clopidogrel 75 MG tablet  Commonly known as: PLAVIX   75 mg, Oral, Daily      esomeprazole 20 MG capsule  Commonly known as: nexIUM   20 mg, Oral, Every Morning Before Breakfast, OTC      lisinopril 20 MG tablet  Commonly known as: PRINIVIL,ZESTRIL   20 mg, Oral, Daily      montelukast 10 MG tablet  Commonly known as: SINGULAIR   10 mg, Oral, Nightly               No Known Allergies      Discharge Disposition:  Home or Self Care    Diet:  Hospital:  Diet Order   Procedures    Diet: Regular/House; Fluid  Consistency: Thin (IDDSI 0)             Restrictions or Other Recommendations:  No lifting over 10 lbs        CODE STATUS:    Code Status and Medical Interventions:   Ordered at: 05/15/24 9872     Level Of Support Discussed With:    Patient     Code Status (Patient has no pulse and is not breathing):    CPR (Attempt to Resuscitate)     Medical Interventions (Patient has pulse or is breathing):    Full Support       No future appointments.    Additional Instructions for the Follow-ups that You Need to Schedule       Ambulatory Referral to Home Health   As directed      Face to Face Visit Date: 5/17/2024   Follow-up provider for Plan of Care?: I treated the patient in an acute care facility and will not continue treatment after discharge.   Follow-up provider: MILADY CROCKER [[id number]]   Reason/Clinical Findings: inguinal hernia, AAA, HTN, hx CVA   Describe mobility limitations that make leaving home difficult: impaired balance/endurance/activity tolerance/strength, pain, SOA   Nursing/Therapeutic Services Requested: Physical Therapy   Frequency: 1 Week 1        Discharge Follow-up with Specialty: Dr. An; 2 Weeks   As directed      Specialty: Dr. An   Follow Up: 2 Weeks                      BEN Guthrie  05/19/24      Time Spent on Discharge:  I spent  40  minutes on this discharge activity which included: face-to-face encounter with the patient, reviewing the data in the system, coordination of the care with the nursing staff as well as consultants, documentation, and entering orders.            Electronically signed by Mikaela Burton APRN at 05/19/24 6488

## 2024-05-19 NOTE — DISCHARGE INSTRUCTIONS
No lifting over 15-20 pounds until you return to the clinic    See Dr. An in 2 weeks. Call tomorrow when the office is open

## 2024-05-20 ENCOUNTER — TRANSITIONAL CARE MANAGEMENT TELEPHONE ENCOUNTER (OUTPATIENT)
Dept: CALL CENTER | Facility: HOSPITAL | Age: 79
End: 2024-05-20
Payer: COMMERCIAL

## 2024-05-20 NOTE — OUTREACH NOTE
Call Center TCM Note      Flowsheet Row Responses   [facility] patient discharged from? [facility]   Does the patient have one of the following disease processes/diagnoses(primary or secondary)? General Surgery   TCM attempt successful? Yes   Call start time 1318   Call end time 1322   Discharge diagnosis *Incarcerated right inguinal hernia (RT inguinal hernia repair w/ mesh placement and appendectomy via cecal wedge resection   Meds reviewed with patient/caregiver? Yes   Is the patient having any side effects they believe may be caused by any medication additions or changes? No   Does the patient have all medications related to this admission filled (includes all antibiotics, pain medications, etc.) Yes   Is the patient taking all medications as directed (includes completed medication regime)? Yes   Does the patient have an appointment with their PCP within 7-14 days of discharge? No   Nursing Interventions Patient declined scheduling/rescheduling appointment at this time, Patient desires to follow up with specialty only   What is the Home health agency?  [facility]   Has home health visited the patient within 72 hours of discharge? Yes   Psychosocial issues? No   Did the patient receive a copy of their discharge instructions? Yes   Nursing interventions Reviewed instructions with patient   What is the patient's perception of their health status since discharge? Improving   Nursing interventions Nurse provided patient education   Is the patient /caregiver able to teach back basic post-op care? Lifting as instructed by MD in discharge instructions, Keep incision areas clean,dry and protected, Do not remove steri-strips, Practice 'cough and deep breath', Drive as instructed by MD in discharge instructions, Take showers only when approved by MD-sponge bathe until then, No tub bath, swimming, or hot tub until instructed by MD   Is the patient/caregiver able to teach back signs and symptoms of incisional  infection? Pus or odor from incision, Incisional warmth, Increased drainage or bleeding, Increased redness, swelling or pain at the incisonal site, Fever   Is the patient/caregiver able to teach back steps to recovery at home? Set small, achievable goals for return to baseline health, Rest and rebuild strength, gradually increase activity   Is the patient/caregiver able to teach back the hierarchy of who to call/visit for symptoms/problems? PCP, Specialist, Home health nurse, Urgent Care, ED, 911 Yes   TCM call completed? Yes   Wrap up additional comments Doing well, no questions, declined to schedule a hospital follow up appt with PCP and states he will be seeing his specialist.   Call end time 1322   Would this patient benefit from a Referral to [facility]? No   Is the patient interested in additional calls from an ambulatory ? No            Sugey Sheikh RN    5/20/2024, 13:23 EDT

## 2024-05-21 ENCOUNTER — OUTSIDE FACILITY SERVICE (OUTPATIENT)
Dept: FAMILY MEDICINE CLINIC | Facility: CLINIC | Age: 79
End: 2024-05-21
Payer: COMMERCIAL

## 2024-05-21 PROCEDURE — G0180 MD CERTIFICATION HHA PATIENT: HCPCS | Performed by: PHYSICIAN ASSISTANT

## 2024-05-21 NOTE — PROGRESS NOTES
"Enter Query Response Below      Query Response: yes    Electronically signed by Raul An MD, 24, 8:50 AM EDT.               If applicable, please update the problem list.       Patient: Dominik Varma        : 1945  Account: [id number]           Admit Date: 5/15/2024        How to Respond to this query:       a. Click New Note     b. Answer query within the yellow box.                c. Update the Problem List, if applicable.      If you have any questions about this query contact me at: [email or web address]     Dr. An,    Based on inpatient coding guidelines, [facility] are not allowed to use diagnoses from pathology reports as the sole basis for billing.  Any findings noted on a pathology report must be affirmed by the treating physician before billing.     The pathologist reported that the specimen shows:    \"Final Diagnosis -- -- -- [facility]  Result:  1. APPENDIX AND PORTION OF CECUM, APPENDECTOMY WITH PARTIAL CECECTOMY:  Severe acute appendicitis with periappendicitis  Benign segment of large intestine  Negative for specific microorganisms  Negative for dysplasia or malignancy  Benign viable surgical resection margin\"    Is this finding consistent with your clinical impression and treatment plan for this patient?    - yes  - no  - other explanation for clinical impression and treatment plan_________  - unable to determine    By submitting this query, we are merely seeking further clarification of documentation to accurately reflect all conditions that you are monitoring, evaluating, treating or that extend the hospitalization or utilize additional resources of care. Please utilize your independent clinical judgment when addressing the question(s) above.     This query and your response, once completed, will be entered into the legal medical record.    Sincerely,  Estephania Neri RN CCDS [facility]  Clinical Documentation Integrity Program   "

## 2024-05-30 ENCOUNTER — PRE-ADMISSION TESTING (OUTPATIENT)
Dept: PREADMISSION TESTING | Facility: HOSPITAL | Age: 79
End: 2024-05-30
Payer: MEDICARE

## 2024-05-30 VITALS — HEIGHT: 72 IN | BODY MASS INDEX: 23.72 KG/M2 | WEIGHT: 175.16 LBS

## 2024-05-30 LAB
ABO GROUP BLD: NORMAL
ANION GAP SERPL CALCULATED.3IONS-SCNC: 9 MMOL/L (ref 5–15)
BASOPHILS # BLD AUTO: 0.05 10*3/MM3 (ref 0–0.2)
BASOPHILS NFR BLD AUTO: 0.6 % (ref 0–1.5)
BUN SERPL-MCNC: 14 MG/DL (ref 8–23)
BUN/CREAT SERPL: 14.1 (ref 7–25)
CALCIUM SPEC-SCNC: 10.1 MG/DL (ref 8.6–10.5)
CHLORIDE SERPL-SCNC: 104 MMOL/L (ref 98–107)
CO2 SERPL-SCNC: 26 MMOL/L (ref 22–29)
CREAT SERPL-MCNC: 0.99 MG/DL (ref 0.76–1.27)
DEPRECATED RDW RBC AUTO: 52.1 FL (ref 37–54)
EGFRCR SERPLBLD CKD-EPI 2021: 78.5 ML/MIN/1.73
EOSINOPHIL # BLD AUTO: 0.51 10*3/MM3 (ref 0–0.4)
EOSINOPHIL NFR BLD AUTO: 5.7 % (ref 0.3–6.2)
ERYTHROCYTE [DISTWIDTH] IN BLOOD BY AUTOMATED COUNT: 15.6 % (ref 12.3–15.4)
GLUCOSE SERPL-MCNC: 111 MG/DL (ref 65–99)
HBA1C MFR BLD: 5.6 % (ref 4.8–5.6)
HCT VFR BLD AUTO: 40.5 % (ref 37.5–51)
HGB BLD-MCNC: 13 G/DL (ref 13–17.7)
IMM GRANULOCYTES # BLD AUTO: 0.05 10*3/MM3 (ref 0–0.05)
IMM GRANULOCYTES NFR BLD AUTO: 0.6 % (ref 0–0.5)
INR PPP: 1.08 (ref 0.89–1.12)
LYMPHOCYTES # BLD AUTO: 1.14 10*3/MM3 (ref 0.7–3.1)
LYMPHOCYTES NFR BLD AUTO: 12.7 % (ref 19.6–45.3)
MCH RBC QN AUTO: 29.5 PG (ref 26.6–33)
MCHC RBC AUTO-ENTMCNC: 32.1 G/DL (ref 31.5–35.7)
MCV RBC AUTO: 91.8 FL (ref 79–97)
MONOCYTES # BLD AUTO: 0.83 10*3/MM3 (ref 0.1–0.9)
MONOCYTES NFR BLD AUTO: 9.3 % (ref 5–12)
NEUTROPHILS NFR BLD AUTO: 6.37 10*3/MM3 (ref 1.7–7)
NEUTROPHILS NFR BLD AUTO: 71.1 % (ref 42.7–76)
NRBC BLD AUTO-RTO: 0 /100 WBC (ref 0–0.2)
PLATELET # BLD AUTO: 216 10*3/MM3 (ref 140–450)
PMV BLD AUTO: 10.8 FL (ref 6–12)
POTASSIUM SERPL-SCNC: 4.5 MMOL/L (ref 3.5–5.2)
PROTHROMBIN TIME: 14.1 SECONDS (ref 12.2–14.5)
RBC # BLD AUTO: 4.41 10*6/MM3 (ref 4.14–5.8)
RH BLD: POSITIVE
SODIUM SERPL-SCNC: 139 MMOL/L (ref 136–145)
WBC NRBC COR # BLD AUTO: 8.95 10*3/MM3 (ref 3.4–10.8)

## 2024-05-30 PROCEDURE — 36415 COLL VENOUS BLD VENIPUNCTURE: CPT

## 2024-05-30 PROCEDURE — 83036 HEMOGLOBIN GLYCOSYLATED A1C: CPT

## 2024-05-30 PROCEDURE — 85025 COMPLETE CBC W/AUTO DIFF WBC: CPT

## 2024-05-30 PROCEDURE — 86900 BLOOD TYPING SEROLOGIC ABO: CPT

## 2024-05-30 PROCEDURE — 85610 PROTHROMBIN TIME: CPT

## 2024-05-30 PROCEDURE — 80048 BASIC METABOLIC PNL TOTAL CA: CPT

## 2024-05-30 PROCEDURE — 86901 BLOOD TYPING SEROLOGIC RH(D): CPT

## 2024-05-30 NOTE — PAT
An arrival time for procedure was not provided during PAT visit. If patient had any questions or concerns about their arrival time, they were instructed to contact their surgeon/physician.  Additionally, if the patient referred to an arrival time that was acquired from their my chart account, patient was encouraged to verify that time with their surgeon/physician. Arrival times are NOT provided in Pre Admission Testing Department.    Per Anesthesia Request, patient instructed not to take their ACE/ARB medications on the AM of surgery.  (Lisinopril)    Patient to apply Chlorhexadine wipes  to surgical area (as instructed) the night before procedure and the AM of procedure. Wipes provided.    Incorrect order or no order for procedure consent.  Please obtain procedure consent on the day of procedure.    During discussion, patient shared that he had not yet been given instructions on holding his Plavix.  He was instructed to call surgeon's office after his appointment today to find out, in order to not cancel/delay his procedure.  He verbalized understanding and agreed to call office.

## 2024-06-03 ENCOUNTER — OFFICE VISIT (OUTPATIENT)
Dept: FAMILY MEDICINE CLINIC | Facility: CLINIC | Age: 79
End: 2024-06-03
Payer: MEDICARE

## 2024-06-03 VITALS
SYSTOLIC BLOOD PRESSURE: 146 MMHG | HEIGHT: 72 IN | HEART RATE: 76 BPM | DIASTOLIC BLOOD PRESSURE: 82 MMHG | WEIGHT: 177.9 LBS | OXYGEN SATURATION: 99 % | TEMPERATURE: 97.7 F | BODY MASS INDEX: 24.09 KG/M2

## 2024-06-03 DIAGNOSIS — Z00.00 MEDICARE ANNUAL WELLNESS VISIT, SUBSEQUENT: Primary | ICD-10-CM

## 2024-06-03 PROCEDURE — 3077F SYST BP >= 140 MM HG: CPT | Performed by: PHYSICIAN ASSISTANT

## 2024-06-03 PROCEDURE — 1160F RVW MEDS BY RX/DR IN RCRD: CPT | Performed by: PHYSICIAN ASSISTANT

## 2024-06-03 PROCEDURE — 1125F AMNT PAIN NOTED PAIN PRSNT: CPT | Performed by: PHYSICIAN ASSISTANT

## 2024-06-03 PROCEDURE — 1159F MED LIST DOCD IN RCRD: CPT | Performed by: PHYSICIAN ASSISTANT

## 2024-06-03 PROCEDURE — G0439 PPPS, SUBSEQ VISIT: HCPCS | Performed by: PHYSICIAN ASSISTANT

## 2024-06-03 PROCEDURE — 3079F DIAST BP 80-89 MM HG: CPT | Performed by: PHYSICIAN ASSISTANT

## 2024-06-03 PROCEDURE — 1170F FXNL STATUS ASSESSED: CPT | Performed by: PHYSICIAN ASSISTANT

## 2024-06-03 NOTE — PROGRESS NOTES
The ABCs of the Annual Wellness Visit  Subsequent Medicare Wellness Visit    Chief Complaint   Patient presents with    Hospital Follow Up Visit     Follow up from  admission for incarcerated inguinal hernia, has surgery with Dr. Merida this week     Medicare Wellness-subsequent     Medicare Wellness Exam       Subjective   History of Present Illness:  Dominik Varma is a 78 y.o. male who presents for a Subsequent Medicare Wellness Visit.  History of Present Illness      The following portions of the patient's history were reviewed and   updated as appropriate: allergies, current medications, past medical history, past social history, past surgical history, and problem list.    Compared to one year ago, the patient feels his physical   health is worse.    Compared to one year ago, the patient feels his mental   health is the same.    Recent Hospitalizations:  This patient has had a [facility] admission record on file within the last 365 days.    Current Medical Providers:  Patient Care Team:  Kassidy Bolton PA-C as PCP - General (Family Medicine)    Outpatient Medications Prior to Visit   Medication Sig Dispense Refill    aspirin 81 MG EC tablet Take 1 tablet by mouth Every Night.      atorvastatin (LIPITOR) 10 MG tablet Take 1 tablet by mouth Daily. 90 tablet 3    clopidogrel (PLAVIX) 75 MG tablet Take 1 tablet by mouth Daily. 90 tablet 3    esomeprazole (nexIUM) 20 MG capsule Take 1 capsule by mouth Every Morning Before Breakfast. OTC 90 capsule 3    lisinopril (PRINIVIL,ZESTRIL) 20 MG tablet Take 1 tablet by mouth Daily. 90 tablet 3    montelukast (SINGULAIR) 10 MG tablet Take 1 tablet by mouth Every Night. 90 tablet 3    bisacodyl (Dulcolax) 5 MG EC tablet Take 1 tablet by mouth Daily. 30 tablet 1    docusate sodium (COLACE) 100 MG capsule Take 1 capsule by mouth 2 (Two) Times a Day. 20 capsule 0    oxyCODONE-acetaminophen (PERCOCET) 5-325 MG per tablet Take 1 tablet by mouth Every 4 (Four)  "Hours As Needed for Severe Pain. 17 tablet 0     No facility-administered medications prior to visit.       No opioid medication identified on active medication list. I have reviewed chart for other potential  high risk medication/s and harmful drug interactions in the elderly.        Aspirin is on active medication list. Aspirin use is indicated based on review of current medical condition/s. Pros and cons of this therapy have been discussed today. Benefits of this medication outweigh potential harm.  Patient has been encouraged to continue taking this medication.  .      Patient Active Problem List   Diagnosis    Cerebrovascular disease    Hyperlipidemia    Essential hypertension, benign    Abnormal stress test    Gastroesophageal reflux disease    Microscopic colitis    Incarcerated right inguinal hernia     Advance Care Planning  ACP discussion was held with the patient during this visit. Patient has an advance directive in EMR which is still valid.     Review of Systems   Constitutional:  Positive for activity change and appetite change. Negative for fever.   Respiratory: Negative.     Cardiovascular: Negative.    Gastrointestinal:  Negative for abdominal distention.   Genitourinary:  Negative for decreased urine volume, difficulty urinating, dysuria, flank pain, scrotal swelling and testicular pain.         Objective      Vitals:    06/03/24 1423   BP: 146/82   Pulse: 76   Temp: 97.7 °F (36.5 °C)   SpO2: 99%   Weight: 80.7 kg (177 lb 14.4 oz)   Height: 182.9 cm (72\")   PainSc: 8  Comment: since admission to      BMI Readings from Last 1 Encounters:   06/03/24 24.13 kg/m²   BMI is within normal parameters. No follow-up required.    Does the patient have evidence of cognitive impairment? No    Physical Exam  Vitals and nursing note reviewed.   Constitutional:       General: He is not in acute distress.     Appearance: Normal appearance. He is well-developed. He is not ill-appearing, toxic-appearing or " diaphoretic.   Neck:      Vascular: No carotid bruit or JVD.   Cardiovascular:      Rate and Rhythm: Normal rate and regular rhythm.      Pulses: Normal pulses.      Heart sounds: Normal heart sounds.   Pulmonary:      Effort: Pulmonary effort is normal. No respiratory distress.      Breath sounds: Normal breath sounds.   Abdominal:      Palpations: Abdomen is soft.      Tenderness: There is no abdominal tenderness.   Skin:     General: Skin is warm and dry.      Findings: No erythema.      Comments: Well healed scars on abdomen from recent surgery   Neurological:      Mental Status: He is alert.       Physical Exam      Lab Results   Component Value Date    TRIG 59 04/10/2024    HDL 31 (L) 04/10/2024    LDL 55 04/10/2024    VLDL 13 04/10/2024    HGBA1C 5.60 2024     Results             HEALTH RISK ASSESSMENT    Smoking Status:  Social History     Tobacco Use   Smoking Status Former    Current packs/day: 0.00    Average packs/day: 1 pack/day for 46.6 years (46.6 ttl pk-yrs)    Types: Cigarettes    Start date:     Quit date: 2023    Years since quittin.8    Passive exposure: Past   Smokeless Tobacco Never     Alcohol Consumption:  Social History     Substance and Sexual Activity   Alcohol Use No     Fall Risk Screen:    STEADI Fall Risk Assessment was completed, and patient is at LOW risk for falls.Assessment completed on:6/3/2024    Depression Screenin/3/2024     2:22 PM   PHQ-2/PHQ-9 Depression Screening   Little Interest or Pleasure in Doing Things 0-->not at all   Feeling Down, Depressed or Hopeless 0-->not at all   PHQ-9: Brief Depression Severity Measure Score 0       Health Habits and Functional and Cognitive Screenin/3/2024     2:22 PM   Functional & Cognitive Status   Do you have difficulty preparing food and eating? No   Do you have difficulty bathing yourself, getting dressed or grooming yourself? No   Do you have difficulty using the toilet? No   Do you have difficulty  moving around from place to place? No   Do you have trouble with steps or getting out of a bed or a chair? No   Current Diet Well Balanced Diet   Dental Exam Up to date   Eye Exam Up to date   Exercise (times per week) 0 times per week   Current Exercises Include No Regular Exercise   Do you need help using the phone?  No   Are you deaf or do you have serious difficulty hearing?  Yes   Do you need help to go to places out of walking distance? No   Do you need help shopping? No   Do you need help preparing meals?  No   Do you need help with housework?  No   Do you need help with laundry? No   Do you need help taking your medications? No   Do you need help managing money? No   Do you ever drive or ride in a car without wearing a seat belt? No   Have you felt unusual stress, anger or loneliness in the last month? No   Who do you live with? Spouse   If you need help, do you have trouble finding someone available to you? No   Have you been bothered in the last four weeks by sexual problems? No   Do you have difficulty concentrating, remembering or making decisions? No       Age-appropriate Screening Schedule:  Refer to the list below for future screening recommendations based on patient's age, sex and/or medical conditions. Orders for these recommended tests are listed in the plan section. The patient has been provided with a written plan.    Health Maintenance   Topic Date Due    Pneumococcal Vaccine 65+ (2 of 2 - PCV) 02/01/2022    COVID-19 Vaccine (6 - 2023-24 season) 08/23/2024 (Originally 9/1/2023)    TDAP/TD VACCINES (1 - Tdap) 10/09/2024 (Originally 8/9/1964)    Hepatitis B (2 of 3 - Hep B Twinrix risk 3-dose series) 06/03/2025 (Originally 11/12/2018)    LUNG CANCER SCREENING  06/03/2025 (Originally 10/17/2019)    INFLUENZA VACCINE  08/01/2024    LIPID PANEL  04/10/2025    ANNUAL WELLNESS VISIT  06/03/2025    COLORECTAL CANCER SCREENING  03/25/2031    RSV Vaccine - Adults  Completed    ZOSTER VACCINE  Completed     HEPATITIS C SCREENING  Addressed              Assessment & Plan     CMS Preventative Services Quick Reference  Risk Factors Identified During Encounter  None Identified  The above risks/problems have been discussed with the patient.  Follow up actions/plans if indicated are seen below in the Assessment/Plan Section.  Pertinent information has been shared with the patient in the After Visit Summary.    Diagnoses and all orders for this visit:    1. Medicare annual wellness visit, subsequent (Primary)      Assessment & Plan      Follow Up:  Return for Next scheduled follow up.     An After Visit Summary and PPPS were given to the patient.

## 2024-06-05 ENCOUNTER — ANESTHESIA (OUTPATIENT)
Dept: PERIOP | Facility: HOSPITAL | Age: 79
End: 2024-06-05
Payer: COMMERCIAL

## 2024-06-05 ENCOUNTER — APPOINTMENT (OUTPATIENT)
Dept: GENERAL RADIOLOGY | Facility: HOSPITAL | Age: 79
End: 2024-06-05
Payer: COMMERCIAL

## 2024-06-05 ENCOUNTER — HOSPITAL ENCOUNTER (INPATIENT)
Facility: HOSPITAL | Age: 79
LOS: 1 days | Discharge: HOME | End: 2024-06-06
Attending: SURGERY | Admitting: SURGERY
Payer: COMMERCIAL

## 2024-06-05 ENCOUNTER — ANESTHESIA EVENT (OUTPATIENT)
Dept: PERIOP | Facility: HOSPITAL | Age: 79
End: 2024-06-05
Payer: COMMERCIAL

## 2024-06-05 DIAGNOSIS — I71.40 ABDOMINAL AORTIC ANEURYSM (AAA) WITHOUT RUPTURE, UNSPECIFIED PART: Primary | ICD-10-CM

## 2024-06-05 LAB
ABO GROUP BLD: NORMAL
BLD GP AB SCN SERPL QL: NEGATIVE
RH BLD: POSITIVE
T&S EXPIRATION DATE: NORMAL

## 2024-06-05 PROCEDURE — 25010000002 LIDOCAINE 1 % SOLUTION: Performed by: SURGERY

## 2024-06-05 PROCEDURE — 25010000002 NICARDIPINE 2.5 MG/ML SOLUTION 10 ML VIAL: Performed by: SURGERY

## 2024-06-05 PROCEDURE — C1769 GUIDE WIRE: HCPCS | Performed by: SURGERY

## 2024-06-05 PROCEDURE — 0 IODIXANOL PER 1 ML: Performed by: SURGERY

## 2024-06-05 PROCEDURE — 86850 RBC ANTIBODY SCREEN: CPT | Performed by: ANESTHESIOLOGY

## 2024-06-05 PROCEDURE — 99232 SBSQ HOSP IP/OBS MODERATE 35: CPT | Performed by: INTERNAL MEDICINE

## 2024-06-05 PROCEDURE — 25010000002 PROTAMINE SULFATE PER 10 MG: Performed by: ANESTHESIOLOGY

## 2024-06-05 PROCEDURE — C1894 INTRO/SHEATH, NON-LASER: HCPCS | Performed by: SURGERY

## 2024-06-05 PROCEDURE — 25010000002 ONDANSETRON PER 1 MG: Performed by: SURGERY

## 2024-06-05 PROCEDURE — 25010000002 PROPOFOL 10 MG/ML EMULSION: Performed by: NURSE ANESTHETIST, CERTIFIED REGISTERED

## 2024-06-05 PROCEDURE — 25810000003 SODIUM CHLORIDE 0.9 % SOLUTION: Performed by: SURGERY

## 2024-06-05 PROCEDURE — 25010000002 NICARDIPINE 2.5 MG/ML SOLUTION 10 ML VIAL: Performed by: NURSE ANESTHETIST, CERTIFIED REGISTERED

## 2024-06-05 PROCEDURE — 25010000002 FENTANYL CITRATE (PF) 100 MCG/2ML SOLUTION: Performed by: NURSE ANESTHETIST, CERTIFIED REGISTERED

## 2024-06-05 PROCEDURE — C1753 CATH, INTRAVAS ULTRASOUND: HCPCS | Performed by: SURGERY

## 2024-06-05 PROCEDURE — 25010000002 HEPARIN (PORCINE) PER 1000 UNITS: Performed by: NURSE ANESTHETIST, CERTIFIED REGISTERED

## 2024-06-05 PROCEDURE — 25810000003 LACTATED RINGERS PER 1000 ML: Performed by: ANESTHESIOLOGY

## 2024-06-05 PROCEDURE — 25010000002 CEFAZOLIN PER 500 MG: Performed by: SURGERY

## 2024-06-05 PROCEDURE — C1760 CLOSURE DEV, VASC: HCPCS | Performed by: SURGERY

## 2024-06-05 PROCEDURE — 86900 BLOOD TYPING SEROLOGIC ABO: CPT | Performed by: ANESTHESIOLOGY

## 2024-06-05 PROCEDURE — C1887 CATHETER, GUIDING: HCPCS | Performed by: SURGERY

## 2024-06-05 PROCEDURE — 04V03DZ RESTRICTION OF ABDOMINAL AORTA WITH INTRALUMINAL DEVICE, PERCUTANEOUS APPROACH: ICD-10-PCS | Performed by: SURGERY

## 2024-06-05 PROCEDURE — C2628 CATHETER, OCCLUSION: HCPCS | Performed by: SURGERY

## 2024-06-05 PROCEDURE — 25810000003 SODIUM CHLORIDE 0.9 % SOLUTION 250 ML FLEX CONT: Performed by: SURGERY

## 2024-06-05 PROCEDURE — C1889 IMPLANT/INSERT DEVICE, NOC: HCPCS | Performed by: SURGERY

## 2024-06-05 PROCEDURE — 25010000002 HEPARIN (PORCINE) PER 1000 UNITS: Performed by: SURGERY

## 2024-06-05 PROCEDURE — B44FZZ3 ULTRASONOGRAPHY OF RIGHT LOWER EXTREMITY ARTERIES, INTRAVASCULAR: ICD-10-PCS | Performed by: SURGERY

## 2024-06-05 PROCEDURE — 86901 BLOOD TYPING SEROLOGIC RH(D): CPT | Performed by: ANESTHESIOLOGY

## 2024-06-05 PROCEDURE — 25810000003 SODIUM CHLORIDE 0.9 % SOLUTION 250 ML FLEX CONT: Performed by: NURSE ANESTHETIST, CERTIFIED REGISTERED

## 2024-06-05 DEVICE — GRFT EXCLDR C3 TRNK I/LAT 16F 26X14.5MM 12CM: Type: IMPLANTABLE DEVICE | Site: AORTA | Status: FUNCTIONAL

## 2024-06-05 DEVICE — IMPLANTABLE DEVICE: Type: IMPLANTABLE DEVICE | Site: ARTERY ILIAC | Status: FUNCTIONAL

## 2024-06-05 RX ORDER — SODIUM CHLORIDE 9 MG/ML
40 INJECTION, SOLUTION INTRAVENOUS AS NEEDED
Status: DISCONTINUED | OUTPATIENT
Start: 2024-06-05 | End: 2024-06-05 | Stop reason: HOSPADM

## 2024-06-05 RX ORDER — HYDROMORPHONE HYDROCHLORIDE 1 MG/ML
0.5 INJECTION, SOLUTION INTRAMUSCULAR; INTRAVENOUS; SUBCUTANEOUS
Status: DISCONTINUED | OUTPATIENT
Start: 2024-06-05 | End: 2024-06-06 | Stop reason: HOSPADM

## 2024-06-05 RX ORDER — DROPERIDOL 2.5 MG/ML
0.62 INJECTION, SOLUTION INTRAMUSCULAR; INTRAVENOUS ONCE AS NEEDED
Status: DISCONTINUED | OUTPATIENT
Start: 2024-06-05 | End: 2024-06-05 | Stop reason: HOSPADM

## 2024-06-05 RX ORDER — SODIUM CHLORIDE 0.9 % (FLUSH) 0.9 %
10 SYRINGE (ML) INJECTION EVERY 12 HOURS SCHEDULED
Status: DISCONTINUED | OUTPATIENT
Start: 2024-06-05 | End: 2024-06-05 | Stop reason: HOSPADM

## 2024-06-05 RX ORDER — ENOXAPARIN SODIUM 100 MG/ML
40 INJECTION SUBCUTANEOUS DAILY
Status: DISCONTINUED | OUTPATIENT
Start: 2024-06-06 | End: 2024-06-06 | Stop reason: HOSPADM

## 2024-06-05 RX ORDER — FAMOTIDINE 20 MG/1
20 TABLET, FILM COATED ORAL ONCE
Status: COMPLETED | OUTPATIENT
Start: 2024-06-05 | End: 2024-06-05

## 2024-06-05 RX ORDER — IPRATROPIUM BROMIDE AND ALBUTEROL SULFATE 2.5; .5 MG/3ML; MG/3ML
3 SOLUTION RESPIRATORY (INHALATION) ONCE AS NEEDED
Status: DISCONTINUED | OUTPATIENT
Start: 2024-06-05 | End: 2024-06-05 | Stop reason: HOSPADM

## 2024-06-05 RX ORDER — LIDOCAINE HYDROCHLORIDE 10 MG/ML
INJECTION, SOLUTION INFILTRATION; PERINEURAL AS NEEDED
Status: DISCONTINUED | OUTPATIENT
Start: 2024-06-05 | End: 2024-06-05 | Stop reason: HOSPADM

## 2024-06-05 RX ORDER — FENTANYL CITRATE 50 UG/ML
50 INJECTION, SOLUTION INTRAMUSCULAR; INTRAVENOUS
Status: DISCONTINUED | OUTPATIENT
Start: 2024-06-05 | End: 2024-06-05 | Stop reason: HOSPADM

## 2024-06-05 RX ORDER — PROTAMINE SULFATE 10 MG/ML
INJECTION, SOLUTION INTRAVENOUS AS NEEDED
Status: DISCONTINUED | OUTPATIENT
Start: 2024-06-05 | End: 2024-06-05 | Stop reason: SURG

## 2024-06-05 RX ORDER — LISINOPRIL 20 MG/1
20 TABLET ORAL DAILY
Status: DISCONTINUED | OUTPATIENT
Start: 2024-06-05 | End: 2024-06-06 | Stop reason: HOSPADM

## 2024-06-05 RX ORDER — FAMOTIDINE 10 MG/ML
20 INJECTION, SOLUTION INTRAVENOUS ONCE
Status: CANCELLED | OUTPATIENT
Start: 2024-06-05 | End: 2024-06-05

## 2024-06-05 RX ORDER — CLOPIDOGREL BISULFATE 75 MG/1
75 TABLET ORAL EVERY 24 HOURS
Status: DISCONTINUED | OUTPATIENT
Start: 2024-06-05 | End: 2024-06-06 | Stop reason: HOSPADM

## 2024-06-05 RX ORDER — SODIUM CHLORIDE, SODIUM LACTATE, POTASSIUM CHLORIDE, CALCIUM CHLORIDE 600; 310; 30; 20 MG/100ML; MG/100ML; MG/100ML; MG/100ML
9 INJECTION, SOLUTION INTRAVENOUS CONTINUOUS
Status: DISCONTINUED | OUTPATIENT
Start: 2024-06-05 | End: 2024-06-05 | Stop reason: SDUPTHER

## 2024-06-05 RX ORDER — MONTELUKAST SODIUM 10 MG/1
10 TABLET ORAL NIGHTLY
Status: DISCONTINUED | OUTPATIENT
Start: 2024-06-05 | End: 2024-06-06 | Stop reason: HOSPADM

## 2024-06-05 RX ORDER — FENTANYL CITRATE 50 UG/ML
INJECTION, SOLUTION INTRAMUSCULAR; INTRAVENOUS AS NEEDED
Status: DISCONTINUED | OUTPATIENT
Start: 2024-06-05 | End: 2024-06-05 | Stop reason: SURG

## 2024-06-05 RX ORDER — LABETALOL HYDROCHLORIDE 5 MG/ML
5 INJECTION, SOLUTION INTRAVENOUS
Status: DISCONTINUED | OUTPATIENT
Start: 2024-06-05 | End: 2024-06-05 | Stop reason: HOSPADM

## 2024-06-05 RX ORDER — IODIXANOL 320 MG/ML
INJECTION, SOLUTION INTRAVASCULAR AS NEEDED
Status: DISCONTINUED | OUTPATIENT
Start: 2024-06-05 | End: 2024-06-05 | Stop reason: HOSPADM

## 2024-06-05 RX ORDER — MIDAZOLAM HYDROCHLORIDE 1 MG/ML
0.5 INJECTION INTRAMUSCULAR; INTRAVENOUS
Status: DISCONTINUED | OUTPATIENT
Start: 2024-06-05 | End: 2024-06-05 | Stop reason: HOSPADM

## 2024-06-05 RX ORDER — HEPARIN SODIUM 1000 [USP'U]/ML
INJECTION, SOLUTION INTRAVENOUS; SUBCUTANEOUS AS NEEDED
Status: DISCONTINUED | OUTPATIENT
Start: 2024-06-05 | End: 2024-06-05 | Stop reason: SURG

## 2024-06-05 RX ORDER — SODIUM CHLORIDE 0.9 % (FLUSH) 0.9 %
10 SYRINGE (ML) INJECTION AS NEEDED
Status: DISCONTINUED | OUTPATIENT
Start: 2024-06-05 | End: 2024-06-05 | Stop reason: HOSPADM

## 2024-06-05 RX ORDER — ATORVASTATIN CALCIUM 10 MG/1
10 TABLET, FILM COATED ORAL NIGHTLY
Status: DISCONTINUED | OUTPATIENT
Start: 2024-06-05 | End: 2024-06-06 | Stop reason: HOSPADM

## 2024-06-05 RX ORDER — SODIUM CHLORIDE 9 MG/ML
100 INJECTION, SOLUTION INTRAVENOUS CONTINUOUS
Status: DISCONTINUED | OUTPATIENT
Start: 2024-06-05 | End: 2024-06-06 | Stop reason: HOSPADM

## 2024-06-05 RX ORDER — MORPHINE SULFATE 2 MG/ML
1 INJECTION, SOLUTION INTRAMUSCULAR; INTRAVENOUS EVERY 4 HOURS PRN
Status: DISCONTINUED | OUTPATIENT
Start: 2024-06-05 | End: 2024-06-06 | Stop reason: HOSPADM

## 2024-06-05 RX ORDER — NALOXONE HCL 0.4 MG/ML
0.4 VIAL (ML) INJECTION
Status: DISCONTINUED | OUTPATIENT
Start: 2024-06-05 | End: 2024-06-06 | Stop reason: HOSPADM

## 2024-06-05 RX ORDER — HYDROCODONE BITARTRATE AND ACETAMINOPHEN 5; 325 MG/1; MG/1
1 TABLET ORAL EVERY 4 HOURS PRN
Status: DISCONTINUED | OUTPATIENT
Start: 2024-06-05 | End: 2024-06-06 | Stop reason: HOSPADM

## 2024-06-05 RX ORDER — EPHEDRINE SULFATE 50 MG/ML
INJECTION, SOLUTION INTRAVENOUS AS NEEDED
Status: DISCONTINUED | OUTPATIENT
Start: 2024-06-05 | End: 2024-06-05 | Stop reason: SURG

## 2024-06-05 RX ORDER — LIDOCAINE HYDROCHLORIDE 10 MG/ML
INJECTION, SOLUTION EPIDURAL; INFILTRATION; INTRACAUDAL; PERINEURAL AS NEEDED
Status: DISCONTINUED | OUTPATIENT
Start: 2024-06-05 | End: 2024-06-05 | Stop reason: SURG

## 2024-06-05 RX ORDER — NITROGLYCERIN 0.4 MG/1
0.4 TABLET SUBLINGUAL
Status: DISCONTINUED | OUTPATIENT
Start: 2024-06-05 | End: 2024-06-06 | Stop reason: HOSPADM

## 2024-06-05 RX ORDER — HYDROMORPHONE HYDROCHLORIDE 1 MG/ML
0.5 INJECTION, SOLUTION INTRAMUSCULAR; INTRAVENOUS; SUBCUTANEOUS
Status: DISCONTINUED | OUTPATIENT
Start: 2024-06-05 | End: 2024-06-05 | Stop reason: HOSPADM

## 2024-06-05 RX ORDER — ASPIRIN 81 MG/1
81 TABLET ORAL NIGHTLY
Status: DISCONTINUED | OUTPATIENT
Start: 2024-06-05 | End: 2024-06-06 | Stop reason: HOSPADM

## 2024-06-05 RX ORDER — PROPOFOL 10 MG/ML
VIAL (ML) INTRAVENOUS AS NEEDED
Status: DISCONTINUED | OUTPATIENT
Start: 2024-06-05 | End: 2024-06-05 | Stop reason: SURG

## 2024-06-05 RX ORDER — LIDOCAINE HYDROCHLORIDE 10 MG/ML
0.5 INJECTION, SOLUTION EPIDURAL; INFILTRATION; INTRACAUDAL; PERINEURAL ONCE AS NEEDED
Status: COMPLETED | OUTPATIENT
Start: 2024-06-05 | End: 2024-06-05

## 2024-06-05 RX ORDER — ONDANSETRON 4 MG/1
4 TABLET, ORALLY DISINTEGRATING ORAL EVERY 6 HOURS PRN
Status: DISCONTINUED | OUTPATIENT
Start: 2024-06-05 | End: 2024-06-06 | Stop reason: HOSPADM

## 2024-06-05 RX ORDER — PANTOPRAZOLE SODIUM 40 MG/1
40 TABLET, DELAYED RELEASE ORAL
Status: DISCONTINUED | OUTPATIENT
Start: 2024-06-06 | End: 2024-06-06 | Stop reason: HOSPADM

## 2024-06-05 RX ORDER — HYDRALAZINE HYDROCHLORIDE 20 MG/ML
5 INJECTION INTRAMUSCULAR; INTRAVENOUS
Status: DISCONTINUED | OUTPATIENT
Start: 2024-06-05 | End: 2024-06-05 | Stop reason: HOSPADM

## 2024-06-05 RX ORDER — ONDANSETRON 2 MG/ML
4 INJECTION INTRAMUSCULAR; INTRAVENOUS EVERY 6 HOURS PRN
Status: DISCONTINUED | OUTPATIENT
Start: 2024-06-05 | End: 2024-06-06 | Stop reason: HOSPADM

## 2024-06-05 RX ADMIN — NICARDIPINE HYDROCHLORIDE 5 MG/HR: 25 INJECTION, SOLUTION INTRAVENOUS at 17:07

## 2024-06-05 RX ADMIN — ATORVASTATIN CALCIUM 10 MG: 10 TABLET, FILM COATED ORAL at 20:09

## 2024-06-05 RX ADMIN — LISINOPRIL 20 MG: 20 TABLET ORAL at 17:11

## 2024-06-05 RX ADMIN — SODIUM CHLORIDE 2000 MG: 900 INJECTION INTRAVENOUS at 10:37

## 2024-06-05 RX ADMIN — FENTANYL CITRATE 100 MCG: 50 INJECTION, SOLUTION INTRAMUSCULAR; INTRAVENOUS at 10:37

## 2024-06-05 RX ADMIN — PROTAMINE SULFATE 60 MG: 10 INJECTION, SOLUTION INTRAVENOUS at 12:00

## 2024-06-05 RX ADMIN — SODIUM CHLORIDE, POTASSIUM CHLORIDE, SODIUM LACTATE AND CALCIUM CHLORIDE 9 ML/HR: 600; 310; 30; 20 INJECTION, SOLUTION INTRAVENOUS at 08:53

## 2024-06-05 RX ADMIN — SODIUM CHLORIDE 2000 MG: 900 INJECTION INTRAVENOUS at 17:15

## 2024-06-05 RX ADMIN — HEPARIN SODIUM 8000 UNITS: 1000 INJECTION INTRAVENOUS; SUBCUTANEOUS at 10:50

## 2024-06-05 RX ADMIN — SODIUM CHLORIDE, POTASSIUM CHLORIDE, SODIUM LACTATE AND CALCIUM CHLORIDE 9 ML/HR: 600; 310; 30; 20 INJECTION, SOLUTION INTRAVENOUS at 08:54

## 2024-06-05 RX ADMIN — FAMOTIDINE 20 MG: 20 TABLET ORAL at 08:53

## 2024-06-05 RX ADMIN — ASPIRIN 81 MG: 81 TABLET, COATED ORAL at 20:09

## 2024-06-05 RX ADMIN — ONDANSETRON 4 MG: 2 INJECTION INTRAMUSCULAR; INTRAVENOUS at 18:38

## 2024-06-05 RX ADMIN — EPHEDRINE SULFATE 10 MG: 50 INJECTION INTRAVENOUS at 10:50

## 2024-06-05 RX ADMIN — NICARDIPINE HYDROCHLORIDE 5 MG/HR: 25 INJECTION, SOLUTION INTRAVENOUS at 11:33

## 2024-06-05 RX ADMIN — PROPOFOL 50 MG: 10 INJECTION, EMULSION INTRAVENOUS at 10:37

## 2024-06-05 RX ADMIN — LIDOCAINE HYDROCHLORIDE 0.5 ML: 10 INJECTION, SOLUTION EPIDURAL; INFILTRATION; INTRACAUDAL; PERINEURAL at 08:53

## 2024-06-05 RX ADMIN — CLOPIDOGREL BISULFATE 75 MG: 75 TABLET ORAL at 17:11

## 2024-06-05 RX ADMIN — SODIUM CHLORIDE 100 ML/HR: 9 INJECTION, SOLUTION INTRAVENOUS at 17:09

## 2024-06-05 RX ADMIN — HEPARIN SODIUM 3000 UNITS: 1000 INJECTION INTRAVENOUS; SUBCUTANEOUS at 11:35

## 2024-06-05 RX ADMIN — PROPOFOL 75 MCG/KG/MIN: 10 INJECTION, EMULSION INTRAVENOUS at 10:38

## 2024-06-05 RX ADMIN — MONTELUKAST 10 MG: 10 TABLET, FILM COATED ORAL at 20:09

## 2024-06-05 RX ADMIN — LIDOCAINE HYDROCHLORIDE 50 MG: 10 INJECTION, SOLUTION EPIDURAL; INFILTRATION; INTRACAUDAL; PERINEURAL at 10:37

## 2024-06-05 RX ADMIN — HYDROCODONE BITARTRATE AND ACETAMINOPHEN 1 TABLET: 5; 325 TABLET ORAL at 18:49

## 2024-06-05 NOTE — ANESTHESIA PREPROCEDURE EVALUATION
Anesthesia Evaluation     Patient summary reviewed and Nursing notes reviewed                Airway   Mallampati: II  TM distance: >3 FB  Neck ROM: full  No difficulty expected  Dental - normal exam     Pulmonary - normal exam   (+) a smoker Former,  Cardiovascular - normal exam    (+) hypertension, hyperlipidemia,  carotid artery disease      Neuro/Psych- negative ROS  GI/Hepatic/Renal/Endo    (+) GERD    Musculoskeletal (-) negative ROS    Abdominal  - normal exam    Bowel sounds: normal.   Substance History - negative use     OB/GYN negative ob/gyn ROS         Other                          Anesthesia Plan    ASA 3     general and Jasmin     (PROPOFOL)  intravenous induction     Anesthetic plan, risks, benefits, and alternatives have been provided, discussed and informed consent has been obtained with: patient.    Plan discussed with CRNA.        CODE STATUS:

## 2024-06-05 NOTE — ANESTHESIA POSTPROCEDURE EVALUATION
Patient: Dominik Varma    Procedure Summary       Date: 06/05/24 Room / Location:  [facility] /  [facility]    Anesthesia Start: 1030 Anesthesia Stop: 1216    Procedure: ENDOVASCULAR  REPAIR OF AORTIC ANEURYSM (Abdomen) Diagnosis:     Surgeons: Mono Merida MD Provider: Ronnie Kapoor MD    Anesthesia Type: general, Jasmin ASA Status: 3            Anesthesia Type: general, Bement    Vitals  Vitals Value Taken Time   BP     Temp     Pulse 63 06/05/24 1214   Resp     SpO2 92 % 06/05/24 1214   Vitals shown include unfiled device data.        Post Anesthesia Care and Evaluation    Patient location during evaluation: PACU  Patient participation: complete - patient participated  Level of consciousness: awake  Pain score: 0  Pain management: adequate    Airway patency: patent  Anesthetic complications: No anesthetic complications  PONV Status: none  Cardiovascular status: acceptable and stable  Respiratory status: nasal cannula, unassisted, acceptable and spontaneous ventilation  Hydration status: acceptable

## 2024-06-05 NOTE — PLAN OF CARE
Goal Outcome Evaluation:                 -Pt arrived from surgery alert and oriented, able to follow commands, move all extremities, and denies pain.   -Oxygen saturations >90% on room air.   -Adequate urine output while on unit.  -Denies nausea or vomiting post anesthesia.  -Cardene gtt started for SBP >140.

## 2024-06-05 NOTE — PROGRESS NOTES
"Critical Care Note     LOS: 0 days   Patient Care Team:  Kassidy Bolton PA-C as PCP - General (Family Medicine)    Chief Complaint/Reason for visit:      Abdominal aortic aneurysm  Hypertension  Hyperlipidemia      Subjective     Interval History:     Patient underwent endograft repair of a AAA this afternoon without any immediate complication.  He denies headache, nausea, vomiting, weakness, numbness.  He denies chest pain or dyspnea.  He has some back pain but that happens chronically.  He denies incisional pain.  He is on a Cardene drip at 5 mg/h for hypertension.  Room air saturation is 95%.  He is voided 450 mL.    Review of Systems:    All systems were reviewed and negative except as noted in subjective.    Medical history, surgical history, social history, family history reviewed    Objective     Intake/Output:    Intake/Output Summary (Last 24 hours) at 6/5/2024 1727  Last data filed at 6/5/2024 1700  Gross per 24 hour   Intake --   Output 450 ml   Net -450 ml       Nutrition:  Diet: Regular/House; Fluid Consistency: Thin (IDDSI 0)    Infusions:  lactated ringers, 9 mL/hr, Last Rate: 9 mL/hr (06/05/24 1030)  niCARdipine, 5-15 mg/hr, Last Rate: 5 mg/hr (06/05/24 1707)  sodium chloride, 100 mL/hr, Last Rate: 100 mL/hr (06/05/24 1709)        Mechanical Ventilator Settings:                                                Telemetry: Sinus rhythm             Vital Signs  Blood pressure 141/74, pulse 72, temperature 98.1 °F (36.7 °C), temperature source Axillary, resp. rate 16, height 182.9 cm (72\"), weight 80.7 kg (177 lb 14.4 oz), SpO2 95%.    Physical Exam:  General Appearance:  Alert elderly gentleman semiupright in bed in no acute distress   Head:  Atraumatic   Eyes:          Conjunctiva pink   Ears:     Throat: Oral mucosa dry   Neck: Trachea midline, no carotid bruit   Back:      Lungs:   Symmetric chest expansion.  Clear to auscultation    Heart:  Regular rhythm, diminished heart sounds, systolic murmur " "  Abdomen:   Bowel sounds present, soft, nontender   Rectal:   Deferred   Extremities: Bilateral femoral puncture sites intact without bleeding   Pulses: Thready but palpable pedal pulses   Skin: No skin rash   Lymph nodes:    Neurologic: Alert, oriented, speech fluent, face symmetric,  equal      Results Review:     I reviewed the patient's new clinical results.   Results from last 7 days   Lab Units 05/30/24  0952   SODIUM mmol/L 139   POTASSIUM mmol/L 4.5   CHLORIDE mmol/L 104   CO2 mmol/L 26.0   BUN mg/dL 14   CREATININE mg/dL 0.99   CALCIUM mg/dL 10.1   GLUCOSE mg/dL 111*     Results from last 7 days   Lab Units 05/30/24  0952   WBC 10*3/mm3 8.95   HEMOGLOBIN g/dL 13.0   HEMATOCRIT % 40.5   PLATELETS 10*3/mm3 216         No results found for: \"BLOODCX\"  No results found for: \"URINECX\"    I reviewed the patient's new imaging including images and reports.    EXAM: CT ABDOMEN PELVIS WO CONTRAST-     DATE OF EXAM: 5/15/2024 10:23 AM     INDICATION: Right inguinal pain..     COMPARISON: None available     TECHNIQUE: Contiguous axial CT images were obtained from the lung bases  to the pubic symphysis without contrast. Sagittal and coronal  reconstructions were performed.  Automated exposure control and  iterative reconstruction methods were used.     FINDINGS:  The lack of intravenous contrast limits the evaluation of visceral and  vascular structures.     The heart size is normal. There is trace left-sided pleural effusion.  There is linear bandlike atelectasis within the left lower lobe.     There is cirrhotic liver morphology. There is no focal liver lesion  identified by noncontrast technique. The gallbladder is present without  wall thickening. There is no intrahepatic or extrahepatic biliary ductal  dilatation. The spleen measures 11.7 cm in craniocaudal dimension. The  adrenal glands appear within normal limits. There is fatty  interdigitation of the pancreas. There is no pancreatic ductal  dilatation.   "   The kidneys are symmetric in size. There is no hydronephrosis. There is  an exophytic 3.2 cm cyst arising from the lateral aspect of the left  kidney. There is no urolithiasis. The urinary bladder is fluid-filled  without wall thickening. The prostate is normal in size with central  calcification.     The stomach and duodenum are normal in caliber and configuration. There  are no abnormally dilated loops of small bowel to suggest small bowel  obstruction or small bowel inflammation. There is a right inguinal  hernia which contains the base of the cecum and the suspected appendix.  There is some mild inflammatory stranding surrounding the hernia sac as  well as fluid tracking into the right inguinal canal. There is a small  right hydrocele. The inflammation of the hernia sac is more likely  related to the hernia being strangulated or incarcerated rather than a  primary appendicitis within the hernia. There is a moderate colonic  stool burden. There is no free air.     There is a large infrarenal saccular aneurysm arising from the left  anterior aspect of the aorta. There is no surrounding blood products to  suggest acute rupture. This measures up to 7.7 cm in maximal dimension.  There is no mesenteric, retroperitoneal, or pelvic lymphadenopathy by  size criteria. There is degenerative disc disease at L2-L3.     IMPRESSION:  1. Large infrarenal saccular type abdominal aortic aneurysm measuring up  to 7.7 cm. Vascular surgery consultation is recommended given the size.  No evidence of surrounding blood products to suggest acute rupture at  this time.  2. Right inguinal hernia containing portions of the base of the cecum  and appendix. There is inflammatory stranding within the hernia sac and  adjacent surrounding fluid. This is more concerning for a strangulated  or incarcerated right inguinal hernia rather than a primary appendicitis  within the hernia sac.  3. Cirrhotic liver morphology.     Findings called to  Wiliam Flor at 10:55 a.m. on 5/15/2024                 This report was finalized on 5/15/2024 11:04 AM by David Velez MD.    All medications reviewed.   aspirin, 81 mg, Oral, Nightly  atorvastatin, 10 mg, Oral, Nightly  ceFAZolin, 2,000 mg, Intravenous, Q8H  clopidogrel, 75 mg, Oral, Q24H  [START ON 6/6/2024] enoxaparin, 40 mg, Subcutaneous, Daily  lisinopril, 20 mg, Oral, Daily  montelukast, 10 mg, Oral, Nightly  [START ON 6/6/2024] pantoprazole, 40 mg, Oral, Q AM          Assessment & Plan       AAA (abdominal aortic aneurysm) without rupture    Hyperlipidemia    Essential hypertension, benign    78-year-old gentleman, former smoker with hypertension, dyslipidemia found to have a right inguinal hernia for which he underwent repair.  CT revealed an abdominal aortic aneurysm as well as liver cirrhosis.  Today he underwent AAA repair with endograft with no immediate complications.  He is now in the ICU with weak but palpable pedal pulses and no evidence of bleeding at his incision sites.  He is requiring Cardene for blood pressure management.  He takes lisinopril 20 mg at home.  May 19 his liver function test were normal except a mildly elevated bilirubin of 1.6.  He does have a 46-pack-year history of tobacco abuse quitting 9 months ago August 2023.  He does not have chronic cough or wheezing.  He gets winded if he walks up hills.  He has never had a screening CAT scan for lung cancer.  He does not use inhalers or supplemental oxygen.  Resting saturation is 97% on room air    PLAN:    Maintain systolic blood pressure less than 140  Monitor incision sites for bleeding  Monitor pedal pulses  Resume lisinopril  Wean Cardene as able  Aspirin, statin    VTE Prophylaxis: Lovenox    Stress Ulcer Prophylaxis: Protonix    Viola Cordoba MD  06/05/24  17:27 EDT      Time: 30min

## 2024-06-05 NOTE — INTERVAL H&P NOTE
"[facility] Pre-op    Full history and physical note from office is attached.    The last dose of Plavix was on 6/4/24.    /83 (BP Location: Left arm, Patient Position: Lying)   Pulse 60   Temp 97.7 °F (36.5 °C) (Temporal)   Resp 16   Ht 182.9 cm (72\")   Wt 80.7 kg (177 lb 14.4 oz)   SpO2 99%   BMI 24.13 kg/m²     Immunizations:  Influenza:  Yes  Pneumococcal:  Yes  Tetanus:  Unsure  Covid : x4      LAB Results:  Lab Results   Component Value Date    WBC 8.95 05/30/2024    HGB 13.0 05/30/2024    HCT 40.5 05/30/2024    MCV 91.8 05/30/2024     05/30/2024    NEUTROABS 6.37 05/30/2024    GLUCOSE 111 (H) 05/30/2024    BUN 14 05/30/2024    CREATININE 0.99 05/30/2024    EGFRIFNONA 81 02/01/2021     05/30/2024    K 4.5 05/30/2024     05/30/2024    CO2 26.0 05/30/2024    MG 2.0 05/19/2024    CALCIUM 10.1 05/30/2024    ALBUMIN 3.6 05/19/2024    AST 16 05/19/2024    ALT 14 05/19/2024    BILITOT 1.6 (H) 05/19/2024    INR 1.08 05/30/2024       Cancer Staging (if applicable)  Cancer Patient: __ yes _x_no __unknown__N/A; If yes, clinical stage T:__ N:__M:__, stage group or __N/A      Impression: Infrarenal abdominal aortic aneurysm without rupture      Plan: ENDOVASCULAR REPAIR OF AORTIC ANEURYSM       Jayjay Martin, APRN   6/5/2024   09:33 EDT  "

## 2024-06-05 NOTE — OP NOTE
ABDOMINAL AORTIC ANEURYSM REPAIR  Procedure Report    Patient Name:  Dominik Varma  YOB: 1945    Date of Surgery:  6/5/2024     Indications: This is a 78-year-old gentleman with an 8 cm abdominal aortic aneurysm which was found incidentally when he presented to the hospital with an incarcerated inguinal hernia.  He has been offered repair for risk reduction of the rupture.  He has strong family history of aneurysm disease.  He lacks femoral pulses and his initial CAT scan was was performed without CT contrast imaging.  He also has known lower extremity occlusive disease.    Pre-op Diagnosis:   Large nonruptured infrarenal abdominal aortic aneurysm       Post-Op Diagnosis Codes:  Partially thrombosed abdominal aortic aneurysm with occlusion of bilateral common and external neck arteries    Procedure/CPT® Codes:      Procedure(s):  1.  Aorto Uni iliac endovascular abdominal aortic aneurysm repair  2.  Extension into the distal right external iliac artery  3.  Intravascular ultrasound right common iliac, external iliac and abdominal aorta  4.  Large bore 16 [place] sheath placement in the right femoral artery for endovascular graft appointment  5.  Ultrasound-guided bilateral femoral arterial access    Staff:  Surgeon(s):  Mono Merida MD    Circulator: Aamir Medina RN; Eliud Guerra, KIMI  Scrub Person: Nissa Barr  Nursing Assistant: Alba Guzman             Anesthesia: Monitored Anesthesia Care    Estimated Blood Loss: <500ml    Implants:    Implant Name Type Inv. Item Serial No.  Lot No. LRB No. Used Action   GRFT EXCLDR C3 TRNK I/LAT 16F 26X14.5MM 12CM - [id number] Implant GRFT EXCLDR C3 TRNK I/LAT 16F 26X14.5MM 12CM  [employer] [id number] N/A 1 Implanted   GRFT EXCLDR C3 TRNK I/LAT 16F 26X14.5MM 12CM - [id number] Implant GRFT EXCLDR C3 TRNK I/LAT 16F 26X14.5MM 12CM  [employer] [id number] N/A 1 Implanted   GRFT EXCLDR CONTRALAT W/ACT/CONTRL/SYS 14.5MM 14CM -  [id number] - [id number] Implant GRFT EXCLDR CONTRALAT W/ACT/CONTRL/SYS 14.5MM 14CM [id number] [employer]  Right 1 Implanted       Specimen:          None        Findings:    1.  Findings of occlusion of bilateral common and external iliac arteries on arterial access  2.  Mostly thrombosed infrarenal abdominal aortic aneurysm.  There is still flow channel present and pressurization of the aorta requiring repair  3.  Use of [place] intravascular ultrasound-guided reentry device to access the true lumen of the aorta through the occluded right common iliac artery  4.  [place] excluder 26 x 14 and an additional 26 x 14 main body placed to create an aorto uni iliac repair  5.  [place] excluder 14-1/2 x 14 right iliac extension    Complications: None    Description of Procedure:    Patient was taken back to the operating placed in supine position on operating table.  Abdomen and groins were widely prepped and draped in sinistral fashion.  A timeout was taken with identification of the patient and procedure.  Under ultrasound evaluation the bilateral femoral arteries were nonpulsatile.  There were normal in diameter with heterogeneous plaque present throughout.  Access was achieved bilaterally under ultrasound guidance.  6 Lao sheaths were placed.  Angiography demonstrated occlusion of bilateral extrailiac arteries.  There was an extensive collateral circulation network present consistent with chronicity of this occlusion.    Multiple attempts to access into the true lumen of the aorta was unsuccessful.  Intravascular ultrasound was next utilized demonstrating there was a pulsatile lumen seen in the common iliac artery on the right side.  Left side was all chronically occluded.  With use of a [place] reentry device and under intravascular ultrasound both extrailiac and common iliac was assessed.  The junction between the 2 vessels I was able to reenter into the true lumen of the right iliac artery.  Wire was able to  traverse within the abdominal aorta.  An 8 Algerian sheath was next advanced into the true lumen of the aorta.  Aortography was performed confirming patency of the infrarenal aorta and mostly thrombosed aortic aneurysm.  Only the right iliac artery was patent and the left side is chronically occluded.  Multiple attempts to cross the left common iliac artery occlusion in similar fashion under intravascular ultrasound and reentry was unsuccessful.    Decision was made to proceed with an aorto Uni repair.  He 26 x 14 [place] excluder device was deployed below the renal arteries.  A second 26 x 14 device was deployed inside the first to create an aorta unique construction.  As the iliac artery on the right side was occluded and mostly thrombosed a 14 x 14-1/2 limb was extended into the distal external iliac artery.  A Graham aortic molding balloon was next used.  This was used to angioplasty the proximal distal and junctions between the graft.  Completion aortography above demonstrated normal filling of the bilateral renals and exclusion of the aortic aneurysm.  A retrograde angiogram distally also demonstrated patency of the very distal external neck artery.  Sheath on the right side was removed and Perclose devices were used to achieve hemostasis.  A single Perclose device was used and the left side which was an 8 Algerian sheath.  Heparin was also reversed with protamine.  Patient was then taken back to recovery in stable condition.      Mono Merida MD     Date: 6/5/2024  Time: 12:08 EDT

## 2024-06-06 ENCOUNTER — READMISSION MANAGEMENT (OUTPATIENT)
Dept: CALL CENTER | Facility: HOSPITAL | Age: 79
End: 2024-06-06
Payer: COMMERCIAL

## 2024-06-06 VITALS
TEMPERATURE: 98.2 F | OXYGEN SATURATION: 95 % | RESPIRATION RATE: 16 BRPM | WEIGHT: 177.9 LBS | DIASTOLIC BLOOD PRESSURE: 67 MMHG | HEIGHT: 72 IN | SYSTOLIC BLOOD PRESSURE: 108 MMHG | HEART RATE: 71 BPM | BODY MASS INDEX: 24.09 KG/M2

## 2024-06-06 LAB
ANION GAP SERPL CALCULATED.3IONS-SCNC: 6 MMOL/L (ref 5–15)
BASOPHILS # BLD AUTO: 0.02 10*3/MM3 (ref 0–0.2)
BASOPHILS NFR BLD AUTO: 0.2 % (ref 0–1.5)
BUN SERPL-MCNC: 11 MG/DL (ref 8–23)
BUN/CREAT SERPL: 14.9 (ref 7–25)
CALCIUM SPEC-SCNC: 8.7 MG/DL (ref 8.6–10.5)
CHLORIDE SERPL-SCNC: 111 MMOL/L (ref 98–107)
CO2 SERPL-SCNC: 23 MMOL/L (ref 22–29)
CREAT SERPL-MCNC: 0.74 MG/DL (ref 0.76–1.27)
DEPRECATED RDW RBC AUTO: 50.4 FL (ref 37–54)
EGFRCR SERPLBLD CKD-EPI 2021: 93.3 ML/MIN/1.73
EOSINOPHIL # BLD AUTO: 0.06 10*3/MM3 (ref 0–0.4)
EOSINOPHIL NFR BLD AUTO: 0.7 % (ref 0.3–6.2)
ERYTHROCYTE [DISTWIDTH] IN BLOOD BY AUTOMATED COUNT: 15.6 % (ref 12.3–15.4)
GLUCOSE SERPL-MCNC: 139 MG/DL (ref 65–99)
HCT VFR BLD AUTO: 27.6 % (ref 37.5–51)
HGB BLD-MCNC: 9 G/DL (ref 13–17.7)
IMM GRANULOCYTES # BLD AUTO: 0.04 10*3/MM3 (ref 0–0.05)
IMM GRANULOCYTES NFR BLD AUTO: 0.4 % (ref 0–0.5)
LYMPHOCYTES # BLD AUTO: 0.65 10*3/MM3 (ref 0.7–3.1)
LYMPHOCYTES NFR BLD AUTO: 7.1 % (ref 19.6–45.3)
MCH RBC QN AUTO: 29 PG (ref 26.6–33)
MCHC RBC AUTO-ENTMCNC: 32.6 G/DL (ref 31.5–35.7)
MCV RBC AUTO: 89 FL (ref 79–97)
MONOCYTES # BLD AUTO: 0.86 10*3/MM3 (ref 0.1–0.9)
MONOCYTES NFR BLD AUTO: 9.4 % (ref 5–12)
NEUTROPHILS NFR BLD AUTO: 7.55 10*3/MM3 (ref 1.7–7)
NEUTROPHILS NFR BLD AUTO: 82.2 % (ref 42.7–76)
NRBC BLD AUTO-RTO: 0 /100 WBC (ref 0–0.2)
PLATELET # BLD AUTO: 137 10*3/MM3 (ref 140–450)
PMV BLD AUTO: 11 FL (ref 6–12)
POTASSIUM SERPL-SCNC: 4.1 MMOL/L (ref 3.5–5.2)
RBC # BLD AUTO: 3.1 10*6/MM3 (ref 4.14–5.8)
SODIUM SERPL-SCNC: 140 MMOL/L (ref 136–145)
WBC NRBC COR # BLD AUTO: 9.18 10*3/MM3 (ref 3.4–10.8)

## 2024-06-06 PROCEDURE — 25010000002 ENOXAPARIN PER 10 MG: Performed by: SURGERY

## 2024-06-06 PROCEDURE — 97161 PT EVAL LOW COMPLEX 20 MIN: CPT

## 2024-06-06 PROCEDURE — 85025 COMPLETE CBC W/AUTO DIFF WBC: CPT | Performed by: SURGERY

## 2024-06-06 PROCEDURE — 25010000002 CEFAZOLIN PER 500 MG: Performed by: SURGERY

## 2024-06-06 PROCEDURE — 97165 OT EVAL LOW COMPLEX 30 MIN: CPT

## 2024-06-06 PROCEDURE — 97116 GAIT TRAINING THERAPY: CPT

## 2024-06-06 PROCEDURE — 80048 BASIC METABOLIC PNL TOTAL CA: CPT | Performed by: SURGERY

## 2024-06-06 RX ORDER — HYDROCODONE BITARTRATE AND ACETAMINOPHEN 5; 325 MG/1; MG/1
2 TABLET ORAL EVERY 6 HOURS PRN
Qty: 10 TABLET | Refills: 0 | Status: SHIPPED | OUTPATIENT
Start: 2024-06-06

## 2024-06-06 RX ADMIN — ENOXAPARIN SODIUM 40 MG: 100 INJECTION SUBCUTANEOUS at 09:09

## 2024-06-06 RX ADMIN — SODIUM CHLORIDE 2000 MG: 900 INJECTION INTRAVENOUS at 01:49

## 2024-06-06 RX ADMIN — PANTOPRAZOLE SODIUM 40 MG: 40 TABLET, DELAYED RELEASE ORAL at 05:15

## 2024-06-06 RX ADMIN — LISINOPRIL 20 MG: 20 TABLET ORAL at 09:10

## 2024-06-06 NOTE — CASE MANAGEMENT/SOCIAL WORK
Discharge Planning Assessment  [facility]     Patient Name: Dominik Varma  MRN: [id number]  Today's Date: 6/6/2024    Admit Date: 6/5/2024    Plan: Home with spouse   Discharge Needs Assessment       Row Name 06/06/24 0833       Living Environment    People in Home spouse    Name(s) of People in Home Anita Varma (spouse) [phone]    Current Living Arrangements home    Potentially Unsafe Housing Conditions none    Primary Care Provided by self    Provides Primary Care For no one    Family Caregiver if Needed spouse    Able to Return to Prior Arrangements yes       Resource/Environmental Concerns    Resource/Environmental Concerns none    Transportation Concerns none       Transition Planning    Patient/Family Anticipates Transition to home with family    Patient/Family Anticipated Services at Transition none    Transportation Anticipated family or friend will provide       Discharge Needs Assessment    Readmission Within the Last 30 Days no previous admission in last 30 days    Equipment Currently Used at Home none    Concerns to be Addressed denies needs/concerns at this time    Anticipated Changes Related to Illness none    Equipment Needed After Discharge none                   Discharge Plan       Row Name 06/06/24 0828       Plan    Plan Home with spouse    Patient/Family in Agreement with Plan yes    Plan Comments Spoke with patient at bedside. Lives with Anita (spouse) [phone] in [place] Is independent with ADL's. No problems with [place] or medications. Uses no DME. No advanced directives. PCP is ANTHONY Michele. Plan is home with spouse. CM will continue to follow.    Final Discharge Disposition Code 01 - home or self-care                  Continued Care and Services - Admitted Since 6/5/2024    No active coordination exists for this encounter.       Selected Continued Care - Prior Encounters Includes continued care and service providers with selected services  from prior encounters from 3/7/2024 to 6/6/2024      Discharged on 5/19/2024 Admission date: 5/15/2024 - Discharge disposition: Home-Health Care Svc      Home Medical Care       Service Provider Selected Services Address Phone Fax Patient Preferred    [facility] ,  Home Rehabilitation [address] [place] [address] [phone] --                             Demographic Summary       Row Name 06/06/24 0832       General Information    Admission Type inpatient    Arrived From PACU/recovery room    Referral Source admission list    Reason for Consult discharge planning    Preferred Language English       Contact Information    Permission Granted to Share Info With     Contact Information Obtained for                    Functional Status       Row Name 06/06/24 0832       Functional Status    Usual Activity Tolerance good    Current Activity Tolerance good       Functional Status, IADL    Medications independent    Meal Preparation independent    Housekeeping independent    Laundry independent    Shopping independent       Mental Status    General Appearance WDL WDL       Mental Status Summary    Recent Changes in Mental Status/Cognitive Functioning no changes       Employment/    Employment Status self-employed                   Psychosocial    No documentation.                  Abuse/Neglect    No documentation.                  Legal    No documentation.                  Substance Abuse    No documentation.                  Patient Forms    No documentation.                     Chico Santiago, RN

## 2024-06-06 NOTE — PLAN OF CARE
Goal Outcome Evaluation:  Plan of Care Reviewed With: patient, family           Outcome Evaluation: Pt presents to OT eval at functional baseline. Pt does not qualify for IPOT services. Please reconsult if there is a change in medical status. d/c rec is home.      Anticipated Discharge Disposition (OT): home

## 2024-06-06 NOTE — DISCHARGE SUMMARY
Date of Discharge:  6/6/2024    Discharge Diagnosis: Abdominal Aortic Aneurysm without rupture    Presenting Problem/History of Present Illness  Active Hospital Problems    Diagnosis  POA    **AAA (abdominal aortic aneurysm) without rupture [I71.40]  Yes    Essential hypertension, benign [I10]  Yes    Hyperlipidemia [E78.5]  Yes      Resolved Hospital Problems   No resolved problems to display.        Hospital Course  Patient is a 78 y.o. male presented with large nonruptured infrarenal abdominal aortic aneurysm . He was admitted under the services of Dr. Mono Merida, vascular surgery. After informed consent was given, the patient was taken to the operating room where he underwent endovascular abdominal aortic aneurysm repair with extension into the distal right external iliac artery. Post procedure, he was admitted to ICU for monitoring and pain management. After a stable hospital course the patient was ready for transfer back to home with his son. Pain was controlled. Diet and activity were well tolerated. Patient was hemodynamically stable and ambulated without difficulty.     Procedures Performed    Procedure(s):  ENDOVASCULAR  REPAIR OF AORTIC ANEURYSM  -------------------       Condition on Discharge:  Good     Vital Signs  Temp:  [97.7 °F (36.5 °C)-100 °F (37.8 °C)] 98.2 °F (36.8 °C)  Heart Rate:  [55-83] 81  Resp:  [14-20] 18  BP: ()/(54-97) 112/59    Physical Exam:  Present at bedside: wife, daughter  General: no acute distress, AAOx4, resting comfortably in bed eating breakfast  Respiratory: normal effort, on RA  Abdomen: soft, nontender  Bilateral Groins: dressings removed, bilateral access sites c/d/I with dermabond, soft, mild surrounding ecchymosis  Extremities: warm and pink without edema, motor and sensory intact  Neuro: moves all extremities equally, able to raise legs off the bed, no focal neuro deficits   Psych: cooperative     Discharge Disposition  Home or Self Care    Discharge  Medications     Discharge Medications        New Medications        Instructions Start Date   HYDROcodone-acetaminophen 5-325 MG per tablet  Commonly known as: NORCO   2 tablets, Oral, Every 6 Hours PRN             Continue These Medications        Instructions Start Date   aspirin 81 MG EC tablet   81 mg, Oral, Nightly      atorvastatin 10 MG tablet  Commonly known as: LIPITOR   10 mg, Oral, Daily      clopidogrel 75 MG tablet  Commonly known as: PLAVIX   75 mg, Oral, Daily      esomeprazole 20 MG capsule  Commonly known as: nexIUM   20 mg, Oral, Every Morning Before Breakfast, OTC      lisinopril 20 MG tablet  Commonly known as: PRINIVIL,ZESTRIL   20 mg, Oral, Daily      montelukast 10 MG tablet  Commonly known as: SINGULAIR   10 mg, Oral, Nightly               Discharge Diet: Regular Diet     Discharge Instructions:   - No heavy lifting greater than 10 pounds!  - Patient recommended to avoid mowing his lawn for the next 2-3 weeks  - May shower  - Keep surgical sites clean and dry  - No driving within 24 hours of taking pain medication  - If you were to experience any severe pain, notify our office and present to the Emergency Department.       Follow-up Appointments  Our office will contact you to set up 1 month follow up appointment and imaging.          Nadia Sol PA-C  06/06/24  10:59 EDT

## 2024-06-06 NOTE — CASE MANAGEMENT/SOCIAL WORK
Case Management Discharge Note      Final Note: Plan is home with spouse. Family will transport. No discharge needs identified.         Selected Continued Care - Admitted Since 6/5/2024       Destination    No services have been selected for the patient.                Durable Medical Equipment    No services have been selected for the patient.                Dialysis/Infusion    No services have been selected for the patient.                Home Medical Care    No services have been selected for the patient.                Therapy    No services have been selected for the patient.                Community Resources    No services have been selected for the patient.                [facility]    No services have been selected for the patient.                    Selected Continued Care - Prior Encounters Includes continued care and service providers with selected services from prior encounters from 3/7/2024 to 6/6/2024      Discharged on 5/19/2024 Admission date: 5/15/2024 - Discharge disposition: Home-Health Care [facility]      Home Medical Care       Service Provider Selected Services Address Phone Fax Patient Preferred    [facility] ,  Home Rehabilitation [address] [facility] [address] [phone] --                               Final Discharge Disposition Code: 01 - home or self-care

## 2024-06-06 NOTE — THERAPY DISCHARGE NOTE
Acute Care - Occupational Therapy Discharge  [facility]    Patient Name: Dominik Varma  : 1945    MRN: [id number]                              Today's Date: 2024       Admit Date: 2024    Visit Dx:     ICD-10-CM ICD-9-CM   1. Abdominal aortic aneurysm (AAA) without rupture, unspecified part  I71.40 441.4     Patient Active Problem List   Diagnosis    Cerebrovascular disease    Hyperlipidemia    Essential hypertension, benign    Abnormal stress test    Gastroesophageal reflux disease    Microscopic colitis    Incarcerated right inguinal hernia    AAA (abdominal aortic aneurysm) without rupture     Past Medical History:   Diagnosis Date    Abdominal aortic aneurysm     Cerumen impaction     GERD (gastroesophageal reflux disease)     Hyperlipidemia     Hypertension     Impaired fasting glucose     Inguinal hernia, right     Occlusion and stenosis of unspecified carotid artery     CVA - right carotid completely blocked per pt report    Stroke      Past Surgical History:   Procedure Laterality Date    CARDIAC CATHETERIZATION N/A 10/17/2017    Procedure: Left Heart Cath;  Surgeon: Zach Phoenix MD;  Location: [facility] CATH INVASIVE LOCATION;  Service:     CEREBRAL ANGIOGRAM      COLONOSCOPY      HERNIA REPAIR      early     INGUINAL HERNIA REPAIR Right 05/15/2024    Procedure: LAPAROSCOPIC RIGHT INGUINAL HERNIA REPAIR  AND APPENDECTOMY WITH DAVINCI ROBOT;  Surgeon: Raul An MD;  Location: [facility] OR;  Service: General;  Laterality: Right;      General Information       Row Name 24 1159          OT Time and Intention    Document Type discharge evaluation/summary  -     Mode of Treatment occupational therapy  -       Row Name 24 1159          General Information    Patient Profile Reviewed yes  -KL     Prior Level of Function independent:;all household mobility;community mobility;gait;transfer;bed mobility;ADL's;work  -     Existing Precautions/Restrictions no  known precautions/restrictions  -     Barriers to Rehab none identified  -[facility] Name 06/06/24 1159          Living Environment    People in Home spouse  -[facility] Name 06/06/24 1159          Home Main Entrance    Number of Stairs, Main Entrance one  -     Stair Railings, Main Entrance none  -[facility] Name 06/06/24 1159          Stairs Within Home, Primary    Number of Stairs, Within Home, Primary none  -[facility] Name 06/06/24 1159          Cognition    Orientation Status (Cognition) oriented x 4  -               User Key  (r) = Recorded By, (t) = Taken By, (c) = Cosigned By      Initials Name Provider Type    Manisha Cruz OT Occupational Therapist                   Mobility/ADL's       Row Name 06/06/24 1200          Bed Mobility    Comment, (Bed Mobility) UIC  -[facility] Name 06/06/24 1200          Transfers    Transfers sit-stand transfer;stand-sit transfer  -KL       Row Name 06/06/24 1200          Sit-Stand Transfer    Sit-Stand [place] (Transfers) independent  -     Assistive Device (Sit-Stand Transfers) other (see comments)  None  -[facility] Name 06/06/24 1200          Stand-Sit Transfer    Stand-Sit [place] (Transfers) independent  -KL       Row Name 06/06/24 1200          Functional Mobility    Functional Mobility- Ind. Level independent  -     Functional Mobility-Distance (Feet) --  >HH distances  -               User Key  (r) = Recorded By, (t) = Taken By, (c) = Cosigned By      Initials Name Provider Type    Manisha Cruz OT Occupational Therapist                   Obj/Interventions       [facility] Name 06/06/24 1200          Sensory Assessment (Somatosensory)    Sensory Assessment (Somatosensory) UE sensation intact  -[facility] Name 06/06/24 1200          Range of Motion Comprehensive    General Range of Motion bilateral upper extremity ROM WFL  -KL       Row Name 06/06/24 1200          Strength Comprehensive (MMT)    Comment, General Manual Muscle Testing  (MMT) Assessment BUE 5/5  -       Row Name 06/06/24 1200          Balance    Balance Assessment sitting static balance;sitting dynamic balance;sit to stand dynamic balance;standing static balance;standing dynamic balance  -     Static Sitting Balance independent  -KL     Dynamic Sitting Balance independent  -KL     Position, Sitting Balance unsupported  -KL     Static Standing Balance independent  -KL     Dynamic Standing Balance independent  -KL     Position/Device Used, Standing Balance unsupported  -KL     Balance Interventions sitting;standing;sit to stand;supported;static;dynamic;occupation based/functional task  -               User Key  (r) = Recorded By, (t) = Taken By, (c) = Cosigned By      Initials Name Provider Type    Manisha Cruz, SUMMER Occupational Therapist                   Goals/Plan    No documentation.                  Clinical Impression       [facility] Name 06/06/24 1201          Pain Assessment    Pretreatment Pain Rating 0/10 - no pain  -     Posttreatment Pain Rating 0/10 - no pain  -       Row Name 06/06/24 1201          Plan of Care Review    Plan of Care Reviewed With patient;family  -     Outcome Evaluation Pt presents to OT eval at functional baseline. Pt does not qualify for IPOT services. Please reconsult if there is a change in medical status. d/c rec is home.  -       Row Name 06/06/24 1201          Therapy Assessment/Plan (OT)    Criteria for Skilled Therapeutic Interventions Met (OT) no problems identified which require skilled intervention  -       Row Name 06/06/24 1201          Therapy Plan Review/Discharge Plan (OT)    Anticipated Discharge Disposition (OT) home  -       Row Name 06/06/24 1201          Vital Signs    Pre Systolic BP Rehab 140  -KL     Pre Treatment Diastolic BP 75  -KL     Post Systolic BP Rehab 147  -KL     Post Treatment Diastolic BP 84  -KL     Pretreatment Heart Rate (beats/min) 73  -KL     Posttreatment Heart Rate (beats/min) 78  -KL     Pre  SpO2 (%) 92  -KL     O2 Delivery Pre Treatment room air  -KL     Post SpO2 (%) 96  -KL     O2 Delivery Post Treatment room air  -KL     Pre Patient Position Sitting  -     Intra Patient Position Standing  -KL     Post Patient Position Sitting  -       Row Name 06/06/24 1201          Positioning and Restraints    Pre-Treatment Position sitting in chair/recliner  -KL     Post Treatment Position chair  -KL     In Chair notified nsg;reclined;sitting;call light within reach;encouraged to call for assist;exit alarm on;with family/caregiver;waffle cushion;legs elevated  -               User Key  (r) = Recorded By, (t) = Taken By, (c) = Cosigned By      Initials Name Provider Type    Manisha Cruz OT Occupational Therapist                   Outcome Measures       Row Name 06/06/24 1202          How much help from another is currently needed...    Putting on and taking off regular lower body clothing? 4  -KL     Bathing (including washing, rinsing, and drying) 4  -KL     Toileting (which includes using toilet bed pan or urinal) 4  -KL     Putting on and taking off regular upper body clothing 4  -KL     Taking care of personal grooming (such as brushing teeth) 4  -KL     Eating meals 4  -KL     AM-PAC 6 Clicks Score (OT) 24  -KL       Row Name 06/06/24 0800          How much help from another person do you currently need...    Turning from your back to your side while in flat bed without using bedrails? 4  -UN     Moving from lying on back to sitting on the side of a flat bed without bedrails? 4  -UN     Moving to and from a bed to a chair (including a wheelchair)? 4  -UN     Standing up from a chair using your arms (e.g., wheelchair, bedside chair)? 4  -UN     Climbing 3-5 steps with a railing? 3  -UN     To walk in hospital room? 3  -UN     AM-PAC 6 Clicks Score (PT) 22  -UN     Highest Level of Mobility Goal 7 --> Walk 25 feet or more  -       Row Name 06/06/24 1202          Functional Assessment    Outcome  Measure Options AM-PAC 6 Clicks Daily Activity (OT)  -               User Key  (r) = Recorded By, (t) = Taken By, (c) = Cosigned By      Initials Name Provider Type    Honey Polk, RN Registered Nurse    Manisha Cruz OT Occupational Therapist                  Occupational Therapy Education       Title: PT OT SLP Therapies (In Progress)       Topic: Occupational Therapy (In Progress)       Point: ADL training (Not Started)       Description:   Instruct learner(s) on proper safety adaptation and remediation techniques during self care or transfers.   Instruct in proper use of assistive devices.                  Learner Progress:  Not documented in this visit.              Point: Home exercise program (Not Started)       Description:   Instruct learner(s) on appropriate technique for monitoring, assisting and/or progressing therapeutic exercises/activities.                  Learner Progress:  Not documented in this visit.              Point: Precautions (Done)       Description:   Instruct learner(s) on prescribed precautions during self-care and functional transfers.                  Learning Progress Summary             Patient Acceptance, E, VU by ERNESTINA at 6/6/2024 1203   Family Acceptance, E, VU by ERNESTINA at 6/6/2024 1203                         Point: Body mechanics (Done)       Description:   Instruct learner(s) on proper positioning and spine alignment during self-care, functional mobility activities and/or exercises.                  Learning Progress Summary             Patient Acceptance, E, VU by ERNESTINA at 6/6/2024 1203   Family Acceptance, E, VU by  at 6/6/2024 1203                                         User Key       Initials Effective Dates Name Provider Type [facility]    ERNESTINA 02/05/24 -  Manisha Becerra OT Occupational Therapist OT                  OT Recommendation and Plan     Plan of Care Review  Plan of Care Reviewed With: patient, family  Outcome Evaluation: Pt presents to OT eval at functional  baseline. Pt does not qualify for IPOT services. Please reconsult if there is a change in medical status. d/c rec is home.  Plan of Care Reviewed With: patient, family  Outcome Evaluation: Pt presents to OT anderson at functional baseline. Pt does not qualify for IPOT services. Please reconsult if there is a change in medical status. d/c rec is home.     Time Calculation:   Evaluation Complexity (OT)  Review Occupational Profile/Medical/Therapy History Complexity: brief/low complexity  Assessment, Occupational Performance/Identification of Deficit Complexity: 1-3 performance deficits  Clinical Decision Making Complexity (OT): problem focused assessment/low complexity  Overall Complexity of Evaluation (OT): low complexity     Time Calculation- OT       Row Name 06/06/24 1203             Time Calculation- OT    OT Start Time 1026  -KL      OT Received On 06/06/24  -KL         Untimed Charges    OT Eval/Re-eval Minutes 36  -KL         Total Minutes    Untimed Charges Total Minutes 36  -KL       Total Minutes 36  -KL                User Key  (r) = Recorded By, (t) = Taken By, (c) = Cosigned By      Initials Name Provider Type    Manisha Cruz OT Occupational Therapist                  Therapy Charges for Today       Code Description Service Date Service Provider Modifiers Qty    [id number]  OT EVAL LOW COMPLEXITY 3 6/6/2024 Manisha Becerra OT GO 1               OT Discharge Summary  Anticipated Discharge Disposition (OT): home  Reason for Discharge: Independent  Outcomes Achieved: Refer to plan of care for updates on goals achieved    Manisha Becerra OT  6/6/2024

## 2024-06-06 NOTE — THERAPY DISCHARGE NOTE
Patient Name: Dominik Varma  : 1945    MRN: [id number]                              Today's Date: 2024       Admit Date: 2024    Visit Dx:     ICD-10-CM ICD-9-CM   1. Abdominal aortic aneurysm (AAA) without rupture, unspecified part  I71.40 441.4     Patient Active Problem List   Diagnosis    Cerebrovascular disease    Hyperlipidemia    Essential hypertension, benign    Abnormal stress test    Gastroesophageal reflux disease    Microscopic colitis    Incarcerated right inguinal hernia    AAA (abdominal aortic aneurysm) without rupture     Past Medical History:   Diagnosis Date    Abdominal aortic aneurysm     Cerumen impaction     GERD (gastroesophageal reflux disease)     Hyperlipidemia     Hypertension     Impaired fasting glucose     Inguinal hernia, right     Occlusion and stenosis of unspecified carotid artery     CVA - right carotid completely blocked per pt report    Stroke      Past Surgical History:   Procedure Laterality Date    CARDIAC CATHETERIZATION N/A 10/17/2017    Procedure: Left Heart Cath;  Surgeon: Zach Phoenix MD;  Location:  [facility] CATH INVASIVE LOCATION;  Service:     CEREBRAL ANGIOGRAM      COLONOSCOPY      HERNIA REPAIR      early     INGUINAL HERNIA REPAIR Right 05/15/2024    Procedure: LAPAROSCOPIC RIGHT INGUINAL HERNIA REPAIR  AND APPENDECTOMY WITH DAVINCI ROBOT;  Surgeon: Raul An MD;  Location:  [facility] OR;  Service: General;  Laterality: Right;      General Information       Row Name 24 1313          Physical Therapy Time and Intention    Document Type discharge evaluation/summary  -ES     Mode of Treatment physical therapy  -ES       Row Name 24 1313          General Information    Patient Profile Reviewed yes  -ES     Prior Level of Function independent:;all household mobility;community mobility;transfer;bed mobility;ADL's  -ES     Existing Precautions/Restrictions other (see comments)  B groin sites - avoid sustained hip flexion   -ES     Barriers to Rehab medically complex  -ES       Row Name 06/06/24 1313          Living Environment    People in Home spouse  -ES       Row Name 06/06/24 1313          Home Main Entrance    Number of Stairs, Main Entrance one  -ES       Row Name 06/06/24 1313          Stairs Within Home, Primary    Number of Stairs, Within Home, Primary none  -ES       Row Name 06/06/24 1313          Cognition    Orientation Status (Cognition) oriented x 4  -ES       Row Name 06/06/24 1313          Safety Issues, Functional Mobility    Safety Issues Affecting Function (Mobility) awareness of need for assistance;insight into deficits/self-awareness  -ES               User Key  (r) = Recorded By, (t) = Taken By, (c) = Cosigned By      Initials Name Provider Type    ES Belia Nolasco PT Physical Therapist                   Mobility       Row Name 06/06/24 1313          Bed Mobility    Comment, (Bed Mobility) UIC  -ES       Row Name 06/06/24 1313          Sit-Stand Transfer    Sit-Stand [place] (Transfers) independent  -ES     Assistive Device (Sit-Stand Transfers) other (see comments)  none  -ES       Row Name 06/06/24 1313          Gait/Stairs (Locomotion)    [place] Level (Gait) independent  -ES     Assistive Device (Gait) other (see comments)  none  -ES     Patient was able to Ambulate yes  -ES     Distance in Feet (Gait) 350  -ES     Deviations/Abnormal Patterns (Gait) stride length decreased  -ES     [place] Level (Stairs) independent  -ES     Handrail Location (Stairs) none  -ES     Number of Steps (Stairs) 5  -ES     Ascending Technique (Stairs) step-over-step  -ES     Descending Technique (Stairs) step-over-step  -ES     Comment, (Gait/Stairs) Pt ambulated and completed stair training independently and without AD. Demo'd good anastasia with step-through gait pattern. Also demo'd good sequencing and safety awareness throughout stair training.  -ES               User Key  (r) = Recorded By, (t) = Taken By,  (c) = Cosigned By      Initials Name Provider Type    ES Belia Nolasco PT Physical Therapist                   Obj/Interventions       Row Name 06/06/24 1314          Range of Motion Comprehensive    General Range of Motion bilateral lower extremity ROM WFL  -ES       Row Name 06/06/24 1314          Strength Comprehensive (MMT)    General Manual Muscle Testing (MMT) Assessment no strength deficits identified  -ES       Row Name 06/06/24 1314          Balance    Balance Assessment sitting static balance;sitting dynamic balance;sit to stand dynamic balance;standing dynamic balance;standing static balance  -ES     Static Sitting Balance independent  -ES     Dynamic Sitting Balance independent  -ES     Position, Sitting Balance unsupported  -ES     Sit to Stand Dynamic Balance independent  -ES     Static Standing Balance independent  -ES     Dynamic Standing Balance independent  -ES     Position/Device Used, Standing Balance unsupported  -ES     Balance Interventions sitting;standing;sit to stand;static;dynamic;occupation based/functional task  -ES       Row Name 06/06/24 1314          Sensory Assessment (Somatosensory)    Sensory Assessment (Somatosensory) LE sensation intact  -ES               User Key  (r) = Recorded By, (t) = Taken By, (c) = Cosigned By      Initials Name Provider Type    ES Belia Nolasco, ILENE Physical Therapist                   Goals/Plan    No documentation.                  Clinical Impression       Row Name 06/06/24 1315          Pain    Pretreatment Pain Rating 0/10 - no pain  -ES     Posttreatment Pain Rating 0/10 - no pain  -ES     Pre/Posttreatment Pain Comment asymptomatic  -ES     Pain Intervention(s) Repositioned;Ambulation/increased activity  -ES       Row Name 06/06/24 1315          Plan of Care Review    Plan of Care Reviewed With patient;family  -ES     Progress no change  -ES     Outcome Evaluation PT eval complete. Pt is currently presenting at baseline for functional  mobility including transfers, ambulation, and stair training. IPPT services not warranted, PT signing off. Rec home at d/c.  -ES       Row Name 06/06/24 1315          Therapy Assessment/Plan (PT)    Criteria for Skilled Interventions Met (PT) no;no problems identified which require skilled intervention  -ES     Therapy Frequency (PT) evaluation only  -ES       Row Name 06/06/24 1315          Vital Signs    Pre Systolic BP Rehab 140  -ES     Pre Treatment Diastolic BP 75  -ES     Post Systolic BP Rehab 147  -ES     Post Treatment Diastolic BP 84  -ES     Pretreatment Heart Rate (beats/min) 74  -ES     Posttreatment Heart Rate (beats/min) 77  -ES     Pre SpO2 (%) 94  -ES     O2 Delivery Pre Treatment room air  -ES     O2 Delivery Intra Treatment room air  -ES     Post SpO2 (%) 96  -ES     O2 Delivery Post Treatment room air  -ES     Pre Patient Position Sitting  -ES     Intra Patient Position Standing  -ES     Post Patient Position Sitting  -ES       Row Name 06/06/24 1315          Positioning and Restraints    Pre-Treatment Position sitting in chair/recliner  -ES     Post Treatment Position chair  -ES     In Chair notified nsg;reclined;sitting;call light within reach;encouraged to call for assist;with family/caregiver;waffle cushion;legs elevated  -ES               User Key  (r) = Recorded By, (t) = Taken By, (c) = Cosigned By      Initials Name Provider Type    ES Belia Nolasco, PT Physical Therapist                   Outcome Measures       Row Name 06/06/24 1316 06/06/24 0800       How much help from another person do you currently need...    Turning from your back to your side while in flat bed without using bedrails? 4  -ES 4  -UN    Moving from lying on back to sitting on the side of a flat bed without bedrails? 4  -ES 4  -UN    Moving to and from a bed to a chair (including a wheelchair)? 4  -ES 4  -UN    Standing up from a chair using your arms (e.g., wheelchair, bedside chair)? 4  -ES 4  -UN    Climbing  3-5 steps with a railing? 4  -ES 3  -UN    To walk in hospital room? 4  -ES 3  -UN    AM-PAC 6 Clicks Score (PT) 24  -ES 22  -UN    Highest Level of Mobility Goal 8 --> Walked 250 feet or more  -ES 7 --> Walk 25 feet or more  -UN      Row Name 06/06/24 1316 06/06/24 1202       Functional Assessment    Outcome Measure Options AM-PAC 6 Clicks Basic Mobility (PT)  -[place] AM-PAC 6 Clicks Daily Activity (OT)  -ERNESTINA              User Key  (r) = Recorded By, (t) = Taken By, (c) = Cosigned By      Initials Name Provider Type    ES Belia Nolasco, ILENE Physical Therapist    Honey Polk RN Registered Nurse    Manisha Cruz OT Occupational Therapist                  Physical Therapy Education       Title: PT OT SLP Therapies (In Progress)       Topic: Physical Therapy (In Progress)       Point: Mobility training (Done)       Learning Progress Summary             Patient Acceptance, E,TB, VU by ES at 6/6/2024 1316   Family Acceptance, E,TB, VU by ES at 6/6/2024 1316                         Point: Home exercise program (Not Started)       Learner Progress:  Not documented in this visit.              Point: Body mechanics (Done)       Learning Progress Summary             Patient Acceptance, E,TB, VU by ES at 6/6/2024 1316   Family Acceptance, E,TB, VU by ES at 6/6/2024 1316                         Point: Precautions (Done)       Learning Progress Summary             Patient Acceptance, E,TB, VU by ES at 6/6/2024 1316   Family Acceptance, E,TB, VU by ES at 6/6/2024 1316                                         User Key       Initials Effective Dates Name Provider Type Discipline     08/11/22 -  Belia Nolasco, PT Physical Therapist PT                  PT Recommendation and Plan     Plan of Care Reviewed With: patient, family  Progress: no change  Outcome Evaluation: PT eval complete. Pt is currently presenting at baseline for functional mobility including transfers, ambulation, and stair training. IPPT services not  warranted, PT signing off. Rec home at d/c.     Time Calculation:   PT Evaluation Complexity  History, PT Evaluation Complexity: 1-2 personal factors and/or comorbidities  Examination of Body Systems (PT Eval Complexity): total of 3 or more elements  Clinical Presentation (PT Evaluation Complexity): stable  Clinical Decision Making (PT Evaluation Complexity): low complexity  Overall Complexity (PT Evaluation Complexity): low complexity     PT Charges       Row Name 06/06/24 1317             Time Calculation    Start Time 1026  -ES      PT Received On 06/06/24  -ES         Time Calculation- PT    Total Timed Code Minutes- PT 9 minute(s)  -ES         Timed Charges    08592 - Gait Training Minutes  9  -ES         Untimed Charges    PT Eval/Re-eval Minutes 31  -ES         Total Minutes    Timed Charges Total Minutes 9  -ES      Untimed Charges Total Minutes 31  -ES       Total Minutes 40  -ES                User Key  (r) = Recorded By, (t) = Taken By, (c) = Cosigned By      Initials Name Provider Type    ES Belia Nolasco, PT Physical Therapist                  Therapy Charges for Today       Code Description Service Date Service Provider Modifiers Qty    77453762312 HC GAIT TRAINING EA 15 MIN 6/6/2024 Belia Nolasco, PT GP 1    91170402666 HC PT EVAL LOW COMPLEXITY 3 6/6/2024 Belia Nolasco, PT GP 1            PT G-Codes  Outcome Measure Options: AM-PAC 6 Clicks Basic Mobility (PT)  AM-PAC 6 Clicks Score (PT): 24  AM-PAC 6 Clicks Score (OT): 24    PT Discharge Summary  Anticipated Discharge Disposition (PT): home  Reason for Discharge: At baseline function    Belia Nolasco PT  6/6/2024

## 2024-06-06 NOTE — OUTREACH NOTE
Prep Survey      Flowsheet Row Responses   [facility] patient discharged from? [place]   Is LACE score < 7 ? No   Eligibility [facility]   Date of Admission 06/05/24   Date of Discharge 06/06/24   Discharge Disposition Home or Self Care   Discharge diagnosis AAA (abdominal aortic aneurysm) without rupture   Does the patient have one of the following disease processes/diagnoses(primary or secondary)? General Surgery   Does the patient have Home health ordered? No   Is there a DME ordered? No   Medication alerts for this patient [employer]   Prep survey completed? Yes            Nancie KAUFMAN - Registered Nurse           R/O Coronary artery disease

## 2024-06-06 NOTE — PLAN OF CARE
Goal Outcome Evaluation:  Plan of Care Reviewed With: patient, family        Progress: no change  Outcome Evaluation: PT eval complete. Pt is currently presenting at baseline for functional mobility including transfers, ambulation, and stair training. IPPT services not warranted, PT signing off. Rec home at d/c.      Anticipated Discharge Disposition (PT): home

## 2024-06-06 NOTE — PROGRESS NOTES
"   LOS: 1 day   Patient Care Team:  Kassidy Bolton PA-C as PCP - General (Family Medicine)      Subjective       Subjective  POD#1 s/p EVAR (6/5/24, Fuad). Patient denies any acute events overnight. Pain well controlled. Tolerating PO intake. Passing flatus and urinating without difficulty. Has not yet ambulated. /58, off cardene. He is hopeful to be discharged today so that he may see his wife following her hernia repair surgery later this morning, he reports his son will be able to take him home.     History taken from: patient family    Objective     Vital Signs  Temp:  [97.7 °F (36.5 °C)-100 °F (37.8 °C)] 100 °F (37.8 °C)  Heart Rate:  [55-83] 71  Resp:  [14-20] 18  BP: ()/(54-97) 105/58    Objective:  Vital signs: (most recent): Blood pressure 105/58, pulse 71, temperature 100 °F (37.8 °C), temperature source Axillary, resp. rate 18, height 182.9 cm (72\"), weight 80.7 kg (177 lb 14.4 oz), SpO2 93%.            Physical Exam:   Present at bedside: wife, daughter  General: no acute distress, AAOx4, resting comfortably in bed eating breakfast  Respiratory: normal effort, on RA  Abdomen: soft, nontender  Bilateral Groins: dressings removed, bilateral access sites c/d/I with dermabond, soft, mild surrounding ecchymosis  Extremities: warm and pink without edema, motor and sensory intact  Neuro: moves all extremities equally, able to raise legs off the bed, no focal neuro deficits   Psych: cooperative     Results Review:     I reviewed the patient's new clinical results.  CBC    Results from last 7 days   Lab Units 06/06/24  0515   WBC 10*3/mm3 9.18   HEMOGLOBIN g/dL 9.0*   PLATELETS 10*3/mm3 137*     BMP   Results from last 7 days   Lab Units 06/06/24  0515   SODIUM mmol/L 140   POTASSIUM mmol/L 4.1   CHLORIDE mmol/L 111*   CO2 mmol/L 23.0   BUN mg/dL 11   CREATININE mg/dL 0.74*   GLUCOSE mg/dL 139*          Current Facility-Administered Medications:     aspirin EC tablet 81 mg, 81 mg, Oral, Nightly, " Mono Merida MD, 81 mg at 06/05/24 2009    atorvastatin (LIPITOR) tablet 10 mg, 10 mg, Oral, Nightly, Mono Merida MD, 10 mg at 06/05/24 2009    clopidogrel (PLAVIX) tablet 75 mg, 75 mg, Oral, Q24H, Mono Merida MD, 75 mg at 06/05/24 1711    Enoxaparin Sodium (LOVENOX) syringe 40 mg, 40 mg, Subcutaneous, Daily, Mono Merida MD, 40 mg at 06/06/24 0909    HYDROcodone-acetaminophen (NORCO) 5-325 MG per tablet 1 tablet, 1 tablet, Oral, Q4H PRN, Mono Merida MD, 1 tablet at 06/05/24 1849    HYDROmorphone (DILAUDID) injection 0.5 mg, 0.5 mg, Intravenous, Q2H PRN **AND** naloxone (NARCAN) injection 0.4 mg, 0.4 mg, Intravenous, Q5 Min PRN, Mono Merida MD    lisinopril (PRINIVIL,ZESTRIL) tablet 20 mg, 20 mg, Oral, Daily, Mono Merida MD, 20 mg at 06/06/24 0910    montelukast (SINGULAIR) tablet 10 mg, 10 mg, Oral, Nightly, Mono Merida MD, 10 mg at 06/05/24 2009    morphine injection 1 mg, 1 mg, Intravenous, Q4H PRN **AND** naloxone (NARCAN) injection 0.4 mg, 0.4 mg, Intravenous, Q5 Min PRN, Mono Merida MD    niCARdipine (CARDENE) 25mg in 250mL NS infusion, 5-15 mg/hr, Intravenous, Titrated, Mono Merida MD, Stopped at 06/05/24 1819    nitroglycerin (NITROSTAT) SL tablet 0.4 mg, 0.4 mg, Sublingual, Q5 Min PRN, Mono Merida MD    ondansetron ODT (ZOFRAN-ODT) disintegrating tablet 4 mg, 4 mg, Oral, Q6H PRN **OR** ondansetron (ZOFRAN) injection 4 mg, 4 mg, Intravenous, Q6H PRN, Mono Merida MD, 4 mg at 06/05/24 1838    pantoprazole (PROTONIX) EC tablet 40 mg, 40 mg, Oral, Q AM, Mono Merida MD, 40 mg at 06/06/24 0515    sodium chloride 0.9 % infusion, 100 mL/hr, Intravenous, Continuous, Mono Merida MD, Last Rate: 100 mL/hr at 06/05/24 1709, 100 mL/hr at 06/05/24 1709     Assessment & Plan       AAA (abdominal aortic aneurysm) without rupture    Hyperlipidemia    Essential hypertension, benign      Assessment & Plan  AAA without rupture  - 6/5/24 (Fuad):   1.  Aorto Uni iliac endovascular abdominal aortic aneurysm repair  2.   Extension into the distal right external iliac artery  3.  Intravascular ultrasound right common iliac, external iliac and abdominal aorta  4.  Large bore 16 [place] sheath placement in the right femoral artery for endovascular graft appointment  5.  Ultrasound-guided bilateral femoral arterial access  - Labs reviewed: Hgb 9.0, stable  - DC arterial line and fluids  - pain control prn  - continue aspirin, plavix, statin  - SBP <150  - Nursing to ambulate patient   - Dispo: plan for likely DC home with family today pending ambulation with nursing     Patient's case was reviewed in detail with Dr. Merida who assisted with the above plan. Plan was reviewed with patient as well as family and nursing at bedside who verbalized understanding and agreement.     Nadia Sol PA-C  06/06/24  10:04 EDT

## 2024-06-06 NOTE — DISCHARGE INSTRUCTIONS
- No heavy lifting greater than 10 pounds  - May shower  - Keep surgical sites clean and dry  - No driving within 24 hours of taking pain medication  - If you were to experience any severe pain, notify our office and present to the Emergency Department.   - Our office will contact you to set up 1 month follow up appointment and imaging.

## 2024-06-06 NOTE — DISCHARGE INSTR - LAB
Kassidy Bolton PA-C PCP - General Family Medicine [phone] [address] [address]      Instructions: June 13th at 11:00AM Thursday

## 2024-06-06 NOTE — PLAN OF CARE
Problem: Adult Inpatient Plan of Care  Goal: Plan of Care Review  6/6/2024 0659 by Traci Brock, RN  Outcome: Ongoing, Progressing  Flowsheets (Taken 6/6/2024 0659)  Progress: improving  Plan of Care Reviewed With: patient    6/5/2024 1911 by Traci Brock, RN  Outcome: Ongoing, Progressing   Goal Outcome Evaluation:    No acute events overnight. BP maintained < 140 per orders without cardene gtt, NSR, tolerating RA. TMax 100. UOP 700mL via urinal, 0 BMs. Alert + oriented x 4.

## 2024-06-07 ENCOUNTER — TRANSITIONAL CARE MANAGEMENT TELEPHONE ENCOUNTER (OUTPATIENT)
Dept: CALL CENTER | Facility: HOSPITAL | Age: 79
End: 2024-06-07
Payer: COMMERCIAL

## 2024-06-07 NOTE — OUTREACH NOTE
Call Center TCM Note      Flowsheet Row Responses   [facility] patient discharged from? [place]   Does the patient have one of the following disease processes/diagnoses(primary or secondary)? General Surgery   TCM attempt successful? Yes  [verbal release for Dominik Howard and Lazara listed on contact]   Call start time 0934   Call end time 0938   Discharge diagnosis AAA (abdominal aortic aneurysm) without rupture   Meds reviewed with patient/caregiver? Yes   Is the patient having any side effects they believe may be caused by any medication additions or changes? No   Does the patient have all medications related to this admission filled (includes all antibiotics, pain medications, etc.) No   Nursing Interventions Nurse provided patient education   Prescription comments pt reports he has not picked up norco as not needed   Is the patient taking all medications as directed (includes completed medication regime)? Yes   Comments Hospital f/u 6/13/24@1100am   Does the patient have an appointment with their PCP within 7-14 days of discharge? Yes   Has home health visited the patient within 72 hours of discharge? N/A   Psychosocial issues? No   Did the patient receive a copy of their discharge instructions? Yes   Nursing interventions Reviewed instructions with patient   What is the patient's perception of their health status since discharge? Improving  [doing well, has some soreness and his normal back pain. No BM yet but reports not much of an appetitie as of yet.  Pt is ambulatory and feels he is recovering well.]   Nursing interventions Nurse provided patient education  [post op instructions reviewed]   Is the patient /caregiver able to teach back basic post-op care? No tub bath, swimming, or hot tub until instructed by MD, Keep incision areas clean,dry and protected, Lifting as instructed by MD in discharge instructions, Practice 'cough and deep breath'   Is the patient/caregiver able to teach back signs and  symptoms of incisional infection? Increased redness, swelling or pain at the incisonal site, Increased drainage or bleeding, Incisional warmth, Pus or odor from incision, Fever  [denies issues with incision]   Is the patient/caregiver able to teach back steps to recovery at home? Rest and rebuild strength, gradually increase activity   Is the patient/caregiver able to teach back the hierarchy of who to call/visit for symptoms/problems? PCP, Specialist, Home health nurse, Urgent Care, ED, 911 Yes   TCM call completed? Yes   Call end time 0938            Jania Bobby RN    6/7/2024, 09:40 EDT

## 2024-06-10 ENCOUNTER — TELEPHONE (OUTPATIENT)
Dept: FAMILY MEDICINE CLINIC | Facility: CLINIC | Age: 79
End: 2024-06-10
Payer: COMMERCIAL

## 2024-06-10 NOTE — TELEPHONE ENCOUNTER
Caller: NIRMALA MEYERS    Relationship: Home Health    Best call back number: [phone]     What is the best time to reach you: ANYTIME    Who are you requesting to speak with (clinical staff, provider,  specific staff member): MILADY CROCKER    What was the call regarding:  [facility] WOULD LIKE TO KNOW IF PCP WOULD LIKE TO RESUME HOME HEALTH CARE FOR THIS PATIENT?

## 2024-06-10 NOTE — TELEPHONE ENCOUNTER
Please let them know that we are not following him for any ongoing home health care at this time it would be his surgeon or vascular surgeon.

## 2024-10-31 ENCOUNTER — OFFICE VISIT (OUTPATIENT)
Dept: FAMILY MEDICINE CLINIC | Facility: CLINIC | Age: 79
End: 2024-10-31
Payer: MEDICARE

## 2024-10-31 VITALS
WEIGHT: 190.3 LBS | BODY MASS INDEX: 25.78 KG/M2 | HEIGHT: 72 IN | OXYGEN SATURATION: 98 % | DIASTOLIC BLOOD PRESSURE: 78 MMHG | HEART RATE: 70 BPM | TEMPERATURE: 98.9 F | SYSTOLIC BLOOD PRESSURE: 132 MMHG

## 2024-10-31 DIAGNOSIS — Z87.891 PERSONAL HISTORY OF TOBACCO USE, PRESENTING HAZARDS TO HEALTH: ICD-10-CM

## 2024-10-31 DIAGNOSIS — I10 PRIMARY HYPERTENSION: ICD-10-CM

## 2024-10-31 DIAGNOSIS — J30.89 NON-SEASONAL ALLERGIC RHINITIS, UNSPECIFIED TRIGGER: Primary | ICD-10-CM

## 2024-10-31 PROCEDURE — 3075F SYST BP GE 130 - 139MM HG: CPT | Performed by: PHYSICIAN ASSISTANT

## 2024-10-31 PROCEDURE — 99213 OFFICE O/P EST LOW 20 MIN: CPT | Performed by: PHYSICIAN ASSISTANT

## 2024-10-31 PROCEDURE — 1160F RVW MEDS BY RX/DR IN RCRD: CPT | Performed by: PHYSICIAN ASSISTANT

## 2024-10-31 PROCEDURE — 1159F MED LIST DOCD IN RCRD: CPT | Performed by: PHYSICIAN ASSISTANT

## 2024-10-31 PROCEDURE — 3078F DIAST BP <80 MM HG: CPT | Performed by: PHYSICIAN ASSISTANT

## 2024-10-31 PROCEDURE — 1125F AMNT PAIN NOTED PAIN PRSNT: CPT | Performed by: PHYSICIAN ASSISTANT

## 2024-10-31 RX ORDER — MONTELUKAST SODIUM 10 MG/1
10 TABLET ORAL NIGHTLY
Qty: 30 TABLET | Refills: 11 | Status: SHIPPED | OUTPATIENT
Start: 2024-10-31

## 2024-10-31 NOTE — PROGRESS NOTES
Subjective   Dominik Varma is a 79 y.o. male  Nicotine Dependence (Quit smoking 14 months ago, req possible chest xray due to hx of smoking)      History of Present Illness  History of Present Illness  The patient is a 79-year-old male who is here to discuss smoking cessation. He is accompanied by his wife.    He has a long history of smoking, spanning 60 years, but has successfully quit for the past 14 months. He is now seeking assistance to prevent any potential relapse into smoking.    He has been experiencing a persistent runny nose, which he attributes to a medication prescribed for congestion. He was under the impression that this medication was intended for asthma, a condition he does not have, and therefore did not fill the prescription.    Additionally, he has gained weight, which he believes is due to an increase in food intake following his cessation of smoking.    The following portions of the patient's history were reviewed and updated as appropriate: allergies, current medications, past social history and problem list    Review of Systems   Constitutional:  Positive for appetite change.   HENT:  Positive for congestion, postnasal drip and sneezing.    Respiratory: Negative.     Cardiovascular: Negative.    Gastrointestinal: Negative.        Objective     Vitals:    10/31/24 1537   BP: 132/78   Pulse: 70   Temp: 98.9 °F (37.2 °C)   SpO2: 98%       Physical Exam  Vitals and nursing note reviewed.   Constitutional:       General: He is not in acute distress.     Appearance: Normal appearance. He is well-developed. He is not ill-appearing, toxic-appearing or diaphoretic.      Comments: BMI25   HENT:      Head: Normocephalic and atraumatic.   Neck:      Vascular: No carotid bruit or JVD.   Cardiovascular:      Rate and Rhythm: Normal rate and regular rhythm.      Pulses: Normal pulses.      Heart sounds: Normal heart sounds. No murmur heard.  Pulmonary:      Effort: Pulmonary effort is normal. No  respiratory distress.      Breath sounds: Normal breath sounds.   Abdominal:      Palpations: Abdomen is soft.      Tenderness: There is no abdominal tenderness.   Skin:     General: Skin is warm and dry.   Neurological:      Mental Status: He is alert.       Physical Exam  Vital Signs  Weight is 190 pounds.    Assessment & Plan   Assessment & Plan  1. Nasal congestion.  His symptoms are consistent with nasal congestion. A prescription for montelukast has been sent to his pharmacy. It was explained that it may take one to two weeks for the medication to alleviate his symptoms. If there is no improvement after two weeks, he should inform the clinic.    2. History of smoking.  Given his history of smoking for 60 years, a lung scan is warranted. A CT of the chest has been ordered. The imaging center will contact him to schedule the appointment.      Diagnoses and all orders for this visit:    1. Non-seasonal allergic rhinitis, unspecified trigger (Primary)    2. Personal history of tobacco use, presenting hazards to health  -     XR Chest PA & Lateral; Future    3. Primary hypertension  -     XR Chest PA & Lateral; Future    Other orders  -     montelukast (SINGULAIR) 10 MG tablet; Take 1 tablet by mouth Every Night. For congestion  Dispense: 30 tablet; Refill: 11         Patient or patient representative verbalized consent for the use of Ambient Listening during the visit with  Kassidy Bolton PA-C for chart documentation. 10/31/2024  16:17 EDT

## 2025-04-02 DIAGNOSIS — I67.9 CEREBROVASCULAR DISEASE: ICD-10-CM

## 2025-04-02 RX ORDER — CLOPIDOGREL BISULFATE 75 MG/1
75 TABLET ORAL DAILY
Qty: 90 TABLET | Refills: 3 | Status: SHIPPED | OUTPATIENT
Start: 2025-04-02

## 2025-04-28 DIAGNOSIS — I10 ESSENTIAL HYPERTENSION: ICD-10-CM

## 2025-04-28 RX ORDER — LISINOPRIL 20 MG/1
20 TABLET ORAL DAILY
Qty: 90 TABLET | Refills: 3 | Status: SHIPPED | OUTPATIENT
Start: 2025-04-28

## 2025-06-28 DIAGNOSIS — E78.5 HYPERLIPIDEMIA, UNSPECIFIED HYPERLIPIDEMIA TYPE: ICD-10-CM

## 2025-06-30 RX ORDER — ATORVASTATIN CALCIUM 10 MG/1
10 TABLET, FILM COATED ORAL DAILY
Qty: 90 TABLET | Refills: 3 | Status: SHIPPED | OUTPATIENT
Start: 2025-06-30

## (undated) DEVICE — SUT SILK 3/0 TIES 18IN A184H

## (undated) DEVICE — FOGARTY - HYDRAGRIP SURGICAL - CLAMP INSERTS: Brand: FOGARTY SOFTJAW

## (undated) DEVICE — SPNG GZ WOVN 4X4IN 12PLY 10/BX STRL

## (undated) DEVICE — PK LAP LASR CHOLE 10

## (undated) DEVICE — SEAL

## (undated) DEVICE — SNAP KOVER: Brand: UNBRANDED

## (undated) DEVICE — CANNULA,ADULT,SOFT-TOUCH,7'TUBE,UC: Brand: PENDING

## (undated) DEVICE — VIOLET BRAIDED (POLYGLACTIN 910), SYNTHETIC ABSORBABLE SUTURE: Brand: COATED VICRYL

## (undated) DEVICE — Device

## (undated) DEVICE — SAFESECURE,SECUREMENT,FOLEY CATH,STERILE: Brand: MEDLINE

## (undated) DEVICE — ARM DRAPE

## (undated) DEVICE — RADIFOCUS GLIDEWIRE: Brand: GLIDEWIRE

## (undated) DEVICE — GW STARTER JB STR .035 15X150CM

## (undated) DEVICE — PATIENT RETURN ELECTRODE, SINGLE-USE, CONTACT QUALITY MONITORING, ADULT, WITH 9FT CORD, FOR PATIENTS WEIGING OVER 33LBS. (15KG): Brand: MEGADYNE

## (undated) DEVICE — PINNACLE R/O II INTRODUCER SHEATH WITH RADIOPAQUE MARKER: Brand: PINNACLE

## (undated) DEVICE — INTENDED USE FOR SURGICAL MARKING ON INTACT SKIN, ALSO PROVIDES A PERMANENT METHOD OF IDENTIFYING OBJECTS IN THE OPERATING ROOM: Brand: WRITESITE® REGULAR TIP SKIN MARKER

## (undated) DEVICE — TRAP FLD MINIVAC MEGADYNE 100ML

## (undated) DEVICE — SUT PROLN 3/0 V7 D/A 36IN 8976H

## (undated) DEVICE — ENDOPOUCH RETRIEVER SPECIMEN RETRIEVAL BAGS: Brand: ENDOPOUCH RETRIEVER

## (undated) DEVICE — SOL ANTISTICK CAUTRY ELECTROLUBE LF

## (undated) DEVICE — DBD-DRAPE,LAP,CHOLE,W/TROUGHS,STERILE: Brand: MEDLINE

## (undated) DEVICE — TOTAL TRAY, 16FR 10ML SIL FOLEY, URN: Brand: MEDLINE

## (undated) DEVICE — TRENDELENBURG WINGPAD POSITIONING KIT DELUXE - WITHOUT BODY STRAP: Brand: SOULE MEDICAL

## (undated) DEVICE — CVR PROB ULTRASND/TRANSD W/GEL 18X120CM STRL

## (undated) DEVICE — TBG PENCL TELESCP MEGADYNE SMOKE EVAC 10FT

## (undated) DEVICE — RADIFOCUS GLIDECATH: Brand: GLIDECATH

## (undated) DEVICE — ANTIBACTERIAL UNDYED BRAIDED (POLYGLACTIN 910), SYNTHETIC ABSORBABLE SUTURE: Brand: COATED VICRYL

## (undated) DEVICE — SUT SILK 3/0 SH CR8 18IN C013D

## (undated) DEVICE — SUT PROLN 4/0 V7 36IN 8975H

## (undated) DEVICE — INCISIONLINE PLEDGET SFT 22X1 2.75MM

## (undated) DEVICE — UNDERGLV SURG BIOGEL INDICAT PI SZ8 BLU

## (undated) DEVICE — PINNACLE INTRODUCER SHEATH: Brand: PINNACLE

## (undated) DEVICE — SUT SILK 2/0 TIES 18IN A185H

## (undated) DEVICE — SUT PDS 1 TP1 48IN Z880G BX/12

## (undated) DEVICE — A2000 MULTI-USE SYRINGE KIT, P/N 701277-003KIT CONTENTS: 100ML CONTRAST RESERVOIR AND TUBING WITH CONTRAST SPIKE AND CLAMP: Brand: A2000 MULTI-USE SYRINGE KIT

## (undated) DEVICE — KT INTRO MINISTICK MAX W/GW NITNL/TUNG ECHO STFF 4F 21G 7CM

## (undated) DEVICE — CVR HNDL LIGHT RIGID

## (undated) DEVICE — SUCTION CANISTER 2500CC: Brand: DEROYAL

## (undated) DEVICE — INFLATION DEVICE: Brand: ENCORE 26

## (undated) DEVICE — BALN OCCL MOLDING .035 10TO37MM 4X90CM

## (undated) DEVICE — CATH DIAG EXPO M/ PK 5F FL4/FR4 PIG

## (undated) DEVICE — PK VASC 10

## (undated) DEVICE — SUT PDS 1 CT1 18IN Z741D

## (undated) DEVICE — CATH DIAG EXPO .045 FL3  5F 100CM

## (undated) DEVICE — HYPODERMIC SAFETY NEEDLE: Brand: MONOJECT

## (undated) DEVICE — CATH IMG IVUS VISIONS .035 DIG 8.2F

## (undated) DEVICE — LN INJ CONTRST FLXCIL HP BR F/M LL W/ADAPT/ROT 1200PSI 48IN

## (undated) DEVICE — RADIFOCUS GLIDEWIRE ADVANTAGE TRACK GUIDE WIRE: Brand: GLIDEWIRE ADVANTAGE TRACK

## (undated) DEVICE — CATH SZ ACCUVU SEG/20CM OMNI .038IN 5F 70CM

## (undated) DEVICE — SUT SILK 2/0 SH CR8 18IN CR8 C012D

## (undated) DEVICE — BLADELESS OBTURATOR, LONG: Brand: WECK VISTA

## (undated) DEVICE — GLV SURG BIOGEL LTX PF 7 1/2

## (undated) DEVICE — ENDOPATH XCEL BLADELESS TROCARS WITH STABILITY SLEEVES: Brand: ENDOPATH XCEL

## (undated) DEVICE — GLV SURG SENSICARE PI MIC PF SZ8 LF STRL

## (undated) DEVICE — SUREFORM 45: Brand: SUREFORM

## (undated) DEVICE — CATH GUIDE BERN 5F 65CM

## (undated) DEVICE — SYRINGE,CONTROL,LL,FINGER,GRIP: Brand: MEDLINE INDUSTRIES, INC.

## (undated) DEVICE — DEV COMP RAD PRELUDESYNC 24CM

## (undated) DEVICE — BLADELESS OBTURATOR: Brand: WECK VISTA

## (undated) DEVICE — RADIFOCUS GLIDEWIRE ADVANTAGE GUIDEWIRE: Brand: GLIDEWIRE ADVANTAGE

## (undated) DEVICE — GW INQWIRE FC PTFE STD J/1.5 .035 260

## (undated) DEVICE — COLUMN DRAPE

## (undated) DEVICE — MODEL AT P54, P/N 700608-035KIT CONTENTS: HAND CONTROLLER, 3-WAY HIGH-PRESSURE STOPCOCK WITH ROTATING END AND PREMIUM HIGH-PRESSURE TUBING: Brand: ANGIOTOUCH® KIT

## (undated) DEVICE — TIP COVER ACCESSORY

## (undated) DEVICE — LEX GENERAL ABDOMINAL SPLIT: Brand: MEDLINE INDUSTRIES, INC.

## (undated) DEVICE — ELECTRD BLD EZ CLN MOD 4IN

## (undated) DEVICE — 3M™ IOBAN™ 2 ANTIMICROBIAL INCISE DRAPE 6651EZ: Brand: IOBAN™ 2

## (undated) DEVICE — SUT MNCRYL PLS ANTIB UD 4/0 PS2 18IN

## (undated) DEVICE — MODEL BT2000 P/N 700287-012KIT CONTENTS: MANIFOLD WITH SALINE AND CONTRAST PORTS, SALINE TUBING WITH SPIKE AND HAND SYRINGE, TRANSDUCER: Brand: BT2000 AUTOMATED MANIFOLD KIT

## (undated) DEVICE — BLANKT WARM UPPR/BDY ARM/OUT 57X196CM

## (undated) DEVICE — SUT PDS LP 1 TP1 96IN VIO PDP880GA

## (undated) DEVICE — PK CATH CARD 10

## (undated) DEVICE — GOWN SURG ORBIS LVL3 2XL STRL

## (undated) DEVICE — MEDI-VAC YANKAUER SUCTION HANDLE W/BULBOUS TIP: Brand: CARDINAL HEALTH

## (undated) DEVICE — DRSNG WND BORDR/ADHS NONADHR/GZ LF 4X14IN STRL

## (undated) DEVICE — GW MICRO APPROACH CTO25 .014 300CM

## (undated) DEVICE — SUCTION IRRIGATOR: Brand: ENDOWRIST

## (undated) DEVICE — LAPAROVUE VISIBILITY SYSTEM LAPAROSCOPIC SOLUTIONS: Brand: LAPAROVUE

## (undated) DEVICE — CATH IMG IVUS PIONEER PLS .014GW 6F 120CM

## (undated) DEVICE — GLV SURG SENSICARE PI MIC PF SZ8.5 LF STRL

## (undated) DEVICE — CLTH CLENS READYCLEANSE PERI CARE PK/5

## (undated) DEVICE — DECANTER BAG 9": Brand: MEDLINE INDUSTRIES, INC.

## (undated) DEVICE — PERCLOSE™ PROSTYLE™ SUTURE-MEDIATED CLOSURE AND REPAIR SYSTEM: Brand: PERCLOSE™ PROSTYLE™

## (undated) DEVICE — SUT PROLN 3/0 8842H

## (undated) DEVICE — DRAPE,TOP,102X53,STERILE: Brand: MEDLINE

## (undated) DEVICE — INTRO SHEATH DRYSEAL FLEX 12F4TO4.7MM 33CM

## (undated) DEVICE — GLIDESHEATH SLENDER STAINLESS STEEL KIT: Brand: GLIDESHEATH SLENDER

## (undated) DEVICE — INTRO SHEATH DRYSEAL FLEX 16F 5.3TO6.1MM 33CM

## (undated) DEVICE — ENDOPATH PNEUMONEEDLE INSUFFLATION NEEDLES WITH LUER LOCK CONNECTORS 120MM: Brand: ENDOPATH